# Patient Record
Sex: MALE | Race: WHITE | Employment: UNEMPLOYED | URBAN - METROPOLITAN AREA
[De-identification: names, ages, dates, MRNs, and addresses within clinical notes are randomized per-mention and may not be internally consistent; named-entity substitution may affect disease eponyms.]

---

## 2017-10-22 ENCOUNTER — HOSPITAL ENCOUNTER (EMERGENCY)
Age: 37
Discharge: HOME | End: 2017-10-22
Attending: EMERGENCY MEDICINE
Payer: SELF-PAY

## 2017-10-22 ENCOUNTER — APPOINTMENT (OUTPATIENT)
Dept: GENERAL RADIOLOGY | Age: 37
End: 2017-10-22
Attending: EMERGENCY MEDICINE
Payer: SELF-PAY

## 2017-10-22 VITALS
RESPIRATION RATE: 16 BRPM | TEMPERATURE: 98.5 F | BODY MASS INDEX: 24.14 KG/M2 | DIASTOLIC BLOOD PRESSURE: 82 MMHG | OXYGEN SATURATION: 98 % | WEIGHT: 168.21 LBS | SYSTOLIC BLOOD PRESSURE: 141 MMHG | HEART RATE: 93 BPM

## 2017-10-22 DIAGNOSIS — K08.89 PAIN, DENTAL: ICD-10-CM

## 2017-10-22 DIAGNOSIS — I10 ESSENTIAL HYPERTENSION: ICD-10-CM

## 2017-10-22 DIAGNOSIS — Z76.0 MEDICATION REFILL: Primary | ICD-10-CM

## 2017-10-22 DIAGNOSIS — R07.89 ATYPICAL CHEST PAIN: ICD-10-CM

## 2017-10-22 DIAGNOSIS — R19.7 DIARRHEA, UNSPECIFIED TYPE: ICD-10-CM

## 2017-10-22 LAB
ALBUMIN SERPL-MCNC: 3.6 G/DL (ref 3.5–5)
ALBUMIN/GLOB SERPL: 0.9 {RATIO} (ref 1.1–2.2)
ALP SERPL-CCNC: 90 U/L (ref 45–117)
ALT SERPL-CCNC: 78 U/L (ref 12–78)
ANION GAP SERPL CALC-SCNC: 4 MMOL/L (ref 5–15)
AST SERPL-CCNC: 40 U/L (ref 15–37)
BASOPHILS # BLD: 0 K/UL (ref 0–0.1)
BASOPHILS NFR BLD: 0 % (ref 0–1)
BILIRUB SERPL-MCNC: 0.3 MG/DL (ref 0.2–1)
BUN SERPL-MCNC: 24 MG/DL (ref 6–20)
BUN/CREAT SERPL: 27 (ref 12–20)
CALCIUM SERPL-MCNC: 9 MG/DL (ref 8.5–10.1)
CHLORIDE SERPL-SCNC: 106 MMOL/L (ref 97–108)
CO2 SERPL-SCNC: 28 MMOL/L (ref 21–32)
CREAT SERPL-MCNC: 0.88 MG/DL (ref 0.7–1.3)
EOSINOPHIL # BLD: 0.1 K/UL (ref 0–0.4)
EOSINOPHIL NFR BLD: 1 % (ref 0–7)
ERYTHROCYTE [DISTWIDTH] IN BLOOD BY AUTOMATED COUNT: 15.1 % (ref 11.5–14.5)
GLOBULIN SER CALC-MCNC: 3.9 G/DL (ref 2–4)
GLUCOSE SERPL-MCNC: 101 MG/DL (ref 65–100)
HCT VFR BLD AUTO: 44.5 % (ref 36.6–50.3)
HGB BLD-MCNC: 14.9 G/DL (ref 12.1–17)
LIPASE SERPL-CCNC: 179 U/L (ref 73–393)
LYMPHOCYTES # BLD: 2.1 K/UL (ref 0.8–3.5)
LYMPHOCYTES NFR BLD: 36 % (ref 12–49)
MCH RBC QN AUTO: 28.5 PG (ref 26–34)
MCHC RBC AUTO-ENTMCNC: 33.5 G/DL (ref 30–36.5)
MCV RBC AUTO: 85.2 FL (ref 80–99)
MONOCYTES # BLD: 0.4 K/UL (ref 0–1)
MONOCYTES NFR BLD: 7 % (ref 5–13)
NEUTS SEG # BLD: 3.3 K/UL (ref 1.8–8)
NEUTS SEG NFR BLD: 56 % (ref 32–75)
PLATELET # BLD AUTO: 173 K/UL (ref 150–400)
POTASSIUM SERPL-SCNC: 4.1 MMOL/L (ref 3.5–5.1)
PROT SERPL-MCNC: 7.5 G/DL (ref 6.4–8.2)
RBC # BLD AUTO: 5.22 M/UL (ref 4.1–5.7)
SODIUM SERPL-SCNC: 138 MMOL/L (ref 136–145)
TROPONIN I SERPL-MCNC: <0.04 NG/ML
WBC # BLD AUTO: 6 K/UL (ref 4.1–11.1)

## 2017-10-22 PROCEDURE — 71020 XR CHEST PA LAT: CPT

## 2017-10-22 PROCEDURE — 93005 ELECTROCARDIOGRAM TRACING: CPT

## 2017-10-22 PROCEDURE — 84484 ASSAY OF TROPONIN QUANT: CPT | Performed by: EMERGENCY MEDICINE

## 2017-10-22 PROCEDURE — 99285 EMERGENCY DEPT VISIT HI MDM: CPT

## 2017-10-22 PROCEDURE — 74011250637 HC RX REV CODE- 250/637: Performed by: EMERGENCY MEDICINE

## 2017-10-22 PROCEDURE — 80053 COMPREHEN METABOLIC PANEL: CPT | Performed by: EMERGENCY MEDICINE

## 2017-10-22 PROCEDURE — 36415 COLL VENOUS BLD VENIPUNCTURE: CPT | Performed by: EMERGENCY MEDICINE

## 2017-10-22 PROCEDURE — 83690 ASSAY OF LIPASE: CPT | Performed by: EMERGENCY MEDICINE

## 2017-10-22 PROCEDURE — 85025 COMPLETE CBC W/AUTO DIFF WBC: CPT | Performed by: EMERGENCY MEDICINE

## 2017-10-22 RX ORDER — METOPROLOL TARTRATE 50 MG/1
50 TABLET ORAL 2 TIMES DAILY
Qty: 60 TAB | Refills: 0 | Status: SHIPPED | OUTPATIENT
Start: 2017-10-22 | End: 2017-11-17 | Stop reason: SDUPTHER

## 2017-10-22 RX ORDER — METOPROLOL TARTRATE 50 MG/1
50 TABLET ORAL
Status: COMPLETED | OUTPATIENT
Start: 2017-10-22 | End: 2017-10-22

## 2017-10-22 RX ORDER — LOPERAMIDE HYDROCHLORIDE 2 MG/1
2 CAPSULE ORAL
Qty: 20 CAP | Refills: 0 | Status: SHIPPED | OUTPATIENT
Start: 2017-10-22 | End: 2017-11-17 | Stop reason: SDUPTHER

## 2017-10-22 RX ORDER — ACETAMINOPHEN 325 MG/1
650 TABLET ORAL
Status: COMPLETED | OUTPATIENT
Start: 2017-10-22 | End: 2017-10-22

## 2017-10-22 RX ORDER — LISINOPRIL AND HYDROCHLOROTHIAZIDE 20; 25 MG/1; MG/1
1 TABLET ORAL DAILY
Qty: 30 TAB | Refills: 0 | Status: SHIPPED | OUTPATIENT
Start: 2017-10-22 | End: 2017-11-17 | Stop reason: DRUGHIGH

## 2017-10-22 RX ORDER — PENICILLIN V POTASSIUM 500 MG/1
500 TABLET, FILM COATED ORAL 4 TIMES DAILY
Qty: 28 TAB | Refills: 0 | Status: SHIPPED | OUTPATIENT
Start: 2017-10-22 | End: 2017-10-29

## 2017-10-22 RX ORDER — LISINOPRIL AND HYDROCHLOROTHIAZIDE 20; 25 MG/1; MG/1
1 TABLET ORAL DAILY
COMMUNITY
End: 2017-11-17 | Stop reason: SDUPTHER

## 2017-10-22 RX ORDER — METOPROLOL SUCCINATE 50 MG/1
TABLET, EXTENDED RELEASE ORAL 2 TIMES DAILY
COMMUNITY
End: 2017-11-17 | Stop reason: SDUPTHER

## 2017-10-22 RX ADMIN — METOPROLOL TARTRATE 50 MG: 50 TABLET ORAL at 19:28

## 2017-10-22 RX ADMIN — ACETAMINOPHEN 650 MG: 325 TABLET ORAL at 19:28

## 2017-10-22 RX ADMIN — HYDROCHLOROTHIAZIDE: 25 TABLET ORAL at 19:28

## 2017-10-22 NOTE — ED NOTES
Pt presents to ED for medication refill for metoprolol and Lisinopril-HCTZ because patient is currently at a rehab in [place] and has been unable to go to PCP recently. Pt has left lower dental pain, known bad teeth to left side. Pt reports \"pus\" on left side. Reports taking ibuprofen for pain with some relief. Pt is alert and oriented x 4. Pt denies fever, vomiting. Pt reports nausea. Pt reports diarrhea x last several days x 4 episodes a day and generalized abdominal pain. Pt has been at rehab x 3 months for opiate and cocaine.

## 2017-10-22 NOTE — ED PROVIDER NOTES
[facility].  EMERGENCY DEPARTMENT HISTORY AND PHYSICAL EXAM         Date of Service: 10/22/2017   Patient Name: Dariana Davila   YOB: 1980  Medical Record Number: [id number]    History of Presenting Illness     Chief Complaint   Patient presents with    Medication Refill     Lisinopril 20/25 mg and Metoprolol 50 mg    Dental Pain     thinks he has an infection        History Provided By:  patient and medical records    Additional History:   Dariana Davila is a 44 y.o. male with PMHx significant for opioid abuse, HTN, and hepatitis C, who presents ambulatory to the ED with cc of progressively worsening left lower dental pain that started 3-4 days ago and need for medication refill. He also c/o a generalized headache that started ~ 4 hours ago with associated dizziness. He states that his headache feels like a pressure. He notes that he is prescribed Metoprolol 50 mg BID and Lisinopril-HCTZ 20-25 mg daily. He reports that he ran out of Servio yesterday, and he only took a dose of his Lisinopril this morning. He also c/o constant generalized \"tight\" abdominal pain x 1 week. He states that he has been having ~ 4 episodes of foul smelling diarrhea over the past two days. He notes that he had one episode of blood in his stool yesterday. He also c/o intermittent episodes of central chest pain that radiates to his left arm x 1 month. He reports that his chest pain has worsened over the past 3-4 days. He states that he is currently in rehabilitation for opioid abuse, and he has not had any opioid use in the past 30 days. He denies any recent ABX use, recent sick contacts, or history of cardiac disease. He specifically denies nausea, vomiting, fevers, or other complaints at this time. Social Hx: + Tobacco (0.25 PPD), - EtOH, - Illicit Drugs    There are no other complaints, changes or physical findings at this time.     Primary Care Provider: Rock Roca Octavia Manzo MD     Past History     Past Medical History:   Past Medical History:   Diagnosis Date    Arthritis     Hypertension     Ill-defined condition     Hep C    Other ill-defined conditions(799.89)     hepatitis C, kidney stones    Other ill-defined conditions(799.89)     DDD    Psychiatric disorder     prescription drug abuse        Past Surgical History:   Past Surgical History:   Procedure Laterality Date    COLONOSCOPY N/A 6/29/2016    COLONOSCOPY performed by Tato Alfaro MD at [facility] ENDOSCOPY    HX LITHOTRIPSY      HX UROLOGICAL      lithotripsy        Family History:   Family History   Problem Relation Age of Onset    Heart Disease Mother     Heart Disease Father     Kidney Disease Sister         Social History:   Social History   Substance Use Topics    Smoking status: Current Every Day Smoker     Packs/day: 0.25     Years: 20.00     Types: Cigarettes    Smokeless tobacco: Never Used      Comment: smokes 4 cigars daily    Alcohol use No      Comment: quit approximately 1yr ago        Allergies: Allergies   Allergen Reactions    Ibuprofen Nausea and Vomiting     GI upset; bleeding of the stomach per patient     Nsaids (Non-Steroidal Anti-Inflammatory Drug) Anaphylaxis    Tramadol Seizures    Tylenol [Acetaminophen] Unproven on Challenge     Patient states that he cannot take tylenol because \"its bad for my liver\". Pt reports Hep C and that MD had told him to not take tylenol in the past.         Review of Systems   Review of Systems   Constitutional: Negative for chills, fatigue and fever. HENT: Positive for dental problem. Negative for congestion, rhinorrhea and sore throat. Eyes: Negative for pain, discharge and visual disturbance. Respiratory: Negative for cough, chest tightness, shortness of breath and wheezing. Cardiovascular: Positive for chest pain. Negative for palpitations and leg swelling.    Gastrointestinal: Positive for abdominal pain, blood in stool and diarrhea. Negative for constipation, nausea and vomiting. Genitourinary: Negative for dysuria, frequency and hematuria. Musculoskeletal: Negative for arthralgias, back pain and myalgias. Skin: Negative for rash. Neurological: Positive for dizziness and headaches. Negative for weakness and light-headedness. Psychiatric/Behavioral: Negative. Physical Exam  Physical Exam   Constitutional: He is oriented to person, place, and time. He appears well-developed and well-nourished. No distress. HENT:   Head: Normocephalic and atraumatic. The patient has multiple missing teeth and multiple caries throughout. Tooth #20 is tender to percussion with caries. No intra oral abscess seen. Eyes: EOM are normal. Right eye exhibits no discharge. Left eye exhibits no discharge. No scleral icterus. Neck: Normal range of motion. Neck supple. No tracheal deviation present. Cardiovascular: Normal rate, regular rhythm, normal heart sounds and intact distal pulses. Exam reveals no gallop and no friction rub. No murmur heard. Pulmonary/Chest: Effort normal and breath sounds normal. No respiratory distress. He has no wheezes. He has no rales. Anterior chest wall TTP. Abdominal: Soft. He exhibits no distension. There is no tenderness. Musculoskeletal: Normal range of motion. He exhibits no edema. Lymphadenopathy:     He has no cervical adenopathy. Neurological: He is alert and oriented to person, place, and time. Face is symmetric. Normal speech. Strength is 5/5 to all extremities. (-) Pronator drift. Skin: Skin is warm and dry. No rash noted. Psychiatric: He has a normal mood and affect. Nursing note and vitals reviewed. Medical Decision Making   I am the first provider for this patient. I reviewed the vital signs, available nursing notes, past medical history, past surgical history, family history and social history.      Provider Notes:   Patient initially presented to ED for medication refill and dental pain. Upon ROS, he reports multiple other complaints including headache, abdominal pain, chest pain, diarrhea and dizziness. He appears well on exam, nontoxic, afebrile. Headache is likely primary, do not suspect acute intracranial process. Chest pain has been ongoing x 1 month - low suspicion for ACS. Abdominal exam is benign, overall low suspicion for acute intraabdominal process. Differential includes need for medication refill, dental caries, dental abscess, primary headache, colitis, viral syndrome, dehydration, electrolyte abnormality, pancreatitis, cholecystitis, GIB, atypical chest pain, stable angina, unstable angina, MI, PE, pleurisy, costochondritis, pneumonia, bronchitis, MSK pain. Do not suspect dissection.  - CBC, CMP, lipase, troponin  - CXR  - Blood pressure medications  - Tylenol    ED Course:  6:37 PM   Initial assessment performed. The patients presenting problems have been discussed, and they are in agreement with the care plan formulated and outlined with them. I have encouraged them to ask questions as they arise throughout their visit. Progress Note:  9:43 PM  Labs and imaging unremarkable. Will discharge with PCP follow up. Discussed results, prescriptions and follow up plan with patient. Provided customary return to ED instructions. Patient expressed understanding. Paulina Silver MD      Diagnostic Study Results   Labs -      Recent Results (from the past 12 hour(s))   EKG, 12 LEAD, INITIAL    Collection Time: 10/22/17  7:28 PM   Result Value Ref Range    Ventricular Rate 79 BPM    Atrial Rate 79 BPM    P-R Interval 150 ms    QRS Duration 94 ms    Q-T Interval 360 ms    QTC Calculation (Bezet) 412 ms    Calculated P Axis 49 degrees    Calculated R Axis 55 degrees    Calculated T Axis 31 degrees    Diagnosis       Normal sinus rhythm with sinus arrhythmia  Possible Left atrial enlargement  When compared with ECG of 25-JUN-2016 18:43,  Vent. rate has decreased BY  57 BPM  ST no longer depressed in Inferior leads     CBC WITH AUTOMATED DIFF    Collection Time: 10/22/17  7:37 PM   Result Value Ref Range    WBC 6.0 4.1 - 11.1 K/uL    RBC 5.22 4.10 - 5.70 M/uL    HGB 14.9 12.1 - 17.0 g/dL    HCT 44.5 36.6 - 50.3 %    MCV 85.2 80.0 - 99.0 FL    MCH 28.5 26.0 - 34.0 PG    MCHC 33.5 30.0 - 36.5 g/dL    RDW 15.1 (H) 11.5 - 14.5 %    PLATELET 288 098 - 220 K/uL    NEUTROPHILS 56 32 - 75 %    LYMPHOCYTES 36 12 - 49 %    MONOCYTES 7 5 - 13 %    EOSINOPHILS 1 0 - 7 %    BASOPHILS 0 0 - 1 %    ABS. NEUTROPHILS 3.3 1.8 - 8.0 K/UL    ABS. LYMPHOCYTES 2.1 0.8 - 3.5 K/UL    ABS. MONOCYTES 0.4 0.0 - 1.0 K/UL    ABS. EOSINOPHILS 0.1 0.0 - 0.4 K/UL    ABS. BASOPHILS 0.0 0.0 - 0.1 K/UL   METABOLIC PANEL, COMPREHENSIVE    Collection Time: 10/22/17  7:37 PM   Result Value Ref Range    Sodium 138 136 - 145 mmol/L    Potassium 4.1 3.5 - 5.1 mmol/L    Chloride 106 97 - 108 mmol/L    CO2 28 21 - 32 mmol/L    Anion gap 4 (L) 5 - 15 mmol/L    Glucose 101 (H) 65 - 100 mg/dL    BUN 24 (H) 6 - 20 MG/DL    Creatinine 0.88 0.70 - 1.30 MG/DL    BUN/Creatinine ratio 27 (H) 12 - 20      GFR est AA >60 >60 ml/min/1.73m2    GFR est non-AA >60 >60 ml/min/1.73m2    Calcium 9.0 8.5 - 10.1 MG/DL    Bilirubin, total 0.3 0.2 - 1.0 MG/DL    ALT (SGPT) 78 12 - 78 U/L    AST (SGOT) 40 (H) 15 - 37 U/L    Alk. phosphatase 90 45 - 117 U/L    Protein, total 7.5 6.4 - 8.2 g/dL    Albumin 3.6 3.5 - 5.0 g/dL    Globulin 3.9 2.0 - 4.0 g/dL    A-G Ratio 0.9 (L) 1.1 - 2.2     TROPONIN I    Collection Time: 10/22/17  7:37 PM   Result Value Ref Range    Troponin-I, Qt. <0.04 <0.05 ng/mL   LIPASE    Collection Time: 10/22/17  7:37 PM   Result Value Ref Range    Lipase 179 73 - 393 U/L       Radiologic Studies -  The following have been ordered and reviewed:  CXR Results  (Last 48 hours)               10/22/17 1914  XR CHEST PA LAT Final result    Impression:  IMPRESSION: No acute cardiopulmonary disease. Narrative: Indication:  chest pain intermittent x months        Exam: PA and lateral views of the chest.       Direct comparison is made to prior CXR dated June 2016. Findings: Cardiomediastinal silhouette is within normal limits. Lungs are clear   bilaterally. Pleural spaces are normal. Osseous structures are intact. Vital Signs-Reviewed the patient's vital signs. Patient Vitals for the past 12 hrs:   Temp Pulse Resp BP SpO2   10/22/17 2030 - - - 148/88 99 %   10/22/17 2000 - - - 137/79 99 %   10/22/17 1900 - - - 148/90 99 %   10/22/17 1847 - 93 16 (!) 164/101 100 %   10/22/17 1759 98.5 °F (36.9 °C) 90 16 (!) 164/117 100 %       EKG interpretation: (Preliminary) 19:28  Rhythm: normal sinus rhythm with sinus arrhythmia; and regular . Rate (approx.): 79; Axis: normal; MO interval: normal; QRS interval: normal ; ST/T wave: normal; Other findings: normal.  Written by Carol Maciel ED Scribe, as dictated by Rakel Jaimes MD.    Medications Given in the ED:  Medications   metoprolol tartrate (LOPRESSOR) tablet 50 mg (50 mg Oral Given 10/22/17 1928)   lisinopril/hydroCHLOROthiazide(PRINZIDE/ZESTORETIC) 20/25 mg ( Oral Given 10/22/17 1928)   acetaminophen (TYLENOL) tablet 650 mg (650 mg Oral Given 10/22/17 1928)       Diagnosis:  Clinical Impression:   1. Medication refill    2. Pain, dental    3. Atypical chest pain    4. Diarrhea, unspecified type    5.  Essential hypertension         Plan:  1:   Follow-up Information     Follow up With Details Comments Contact Info    Avi Navarrete MD In 3 days  [place] [address]  [phone]      [facility] EMERGENCY DEPT  As needed, If symptoms worsen [address] [place] [address]  [phone]          2:   Current Discharge Medication List      START taking these medications    Details   !! lisinopril-hydroCHLOROthiazide (PRINZIDE, ZESTORETIC) 20-25 mg per tablet Take 1 Tab by mouth daily.  Qty: 30 Tab, Refills: 0      loperamide (IMODIUM) 2 mg capsule Take 1 Cap by mouth four (4) times daily as needed for Diarrhea. Qty: 20 Cap, Refills: 0      metoprolol tartrate (LOPRESSOR) 50 mg tablet Take 1 Tab by mouth two (2) times a day. Qty: 60 Tab, Refills: 0      penicillin v potassium (VEETID) 500 mg tablet Take 1 Tab by mouth four (4) times daily for 7 days. Qty: 28 Tab, Refills: 0       !! - Potential duplicate medications found. Please discuss with provider. CONTINUE these medications which have NOT CHANGED    Details   !! lisinopril-hydroCHLOROthiazide (PRINZIDE, ZESTORETIC) 20-25 mg per tablet Take 1 Tab by mouth daily. !! - Potential duplicate medications found. Please discuss with provider. Return to ED if worse. Disposition:  Discharge Note:  9:43 PM  The pt is ready for discharge. The pt's signs, symptoms, diagnosis, and discharge instructions have been discussed and pt has conveyed their understanding. The pt is to follow up as recommended or return to ER should their symptoms worsen. Plan has been discussed and pt is in agreement. Attestations: This note is prepared by Rafael Brownlee, acting as a Scribe for Jeaneth Singh MD.    Jeaneth Singh MD: The scribe's documentation has been prepared under my direction and personally reviewed by me in its entirety. I confirm that the notes above accurately reflects all work, treatment, procedures, and medical decision making performed by me. This note will not be viewable in [address]

## 2017-10-22 NOTE — ED NOTES
Bedside and Verbal shift change report given to [address] (oncoming nurse) by Naheed Matthews RN (offgoing nurse). Report included the following information SBAR and ED Summary.

## 2017-10-22 NOTE — ED NOTES
Assumed care of patient from [facility]. Patient resting comfortably in no apparent distress. On monitor x 2. Call bell within reach. Plan of care discussed.

## 2017-10-23 LAB
ATRIAL RATE: 79 BPM
CALCULATED P AXIS, ECG09: 49 DEGREES
CALCULATED R AXIS, ECG10: 55 DEGREES
CALCULATED T AXIS, ECG11: 31 DEGREES
DIAGNOSIS, 93000: NORMAL
P-R INTERVAL, ECG05: 150 MS
Q-T INTERVAL, ECG07: 360 MS
QRS DURATION, ECG06: 94 MS
QTC CALCULATION (BEZET), ECG08: 412 MS
VENTRICULAR RATE, ECG03: 79 BPM

## 2017-10-23 NOTE — DISCHARGE INSTRUCTIONS
High Blood Pressure: Care Instructions  Your Care Instructions  If your blood pressure is usually above 140/90, you have high blood pressure, or hypertension. That means the top number is 140 or higher or the bottom number is 90 or higher, or both. Despite what a lot of people think, high blood pressure usually doesn't cause headaches or make you feel dizzy or lightheaded. It usually has no symptoms. But it does increase your risk for heart attack, stroke, and kidney or eye damage. The higher your blood pressure, the more your risk increases. Your doctor will give you a goal for your blood pressure. Your goal will be based on your health and your age. An example of a goal is to keep your blood pressure below 140/90. Lifestyle changes, such as eating healthy and being active, are always important to help lower blood pressure. You might also take medicine to reach your blood pressure goal.  Follow-up care is a key part of your treatment and safety. Be sure to make and go to all appointments, and call your doctor if you are having problems. It's also a good idea to know your test results and keep a list of the medicines you take. How can you care for yourself at home? Medical treatment  · If you stop taking your medicine, your blood pressure will go back up. You may take one or more types of medicine to lower your blood pressure. Be safe with medicines. Take your medicine exactly as prescribed. Call your doctor if you think you are having a problem with your medicine. · Talk to your doctor before you start taking aspirin every day. Aspirin can help certain people lower their risk of a heart attack or stroke. But taking aspirin isn't right for everyone, because it can cause serious bleeding. · See your doctor regularly. You may need to see the doctor more often at first or until your blood pressure comes down.   · If you are taking blood pressure medicine, talk to your doctor before you take decongestants or anti-inflammatory medicine, such as ibuprofen. Some of these medicines can raise blood pressure. · Learn how to check your blood pressure at home. Lifestyle changes  · Stay at a healthy weight. This is especially important if you put on weight around the waist. Losing even 10 pounds can help you lower your blood pressure. · If your doctor recommends it, get more exercise. Walking is a good choice. Bit by bit, increase the amount you walk every day. Try for at least 30 minutes on most days of the week. You also may want to swim, bike, or do other activities. · Avoid or limit alcohol. Talk to your doctor about whether you can drink any alcohol. · Try to limit how much sodium you eat to less than 2,300 milligrams (mg) a day. Your doctor may ask you to try to eat less than 1,500 mg a day. · Eat plenty of fruits (such as bananas and oranges), vegetables, legumes, whole grains, and low-fat dairy products. · Lower the amount of saturated fat in your diet. Saturated fat is found in animal products such as milk, cheese, and meat. Limiting these foods may help you lose weight and also lower your risk for heart disease. · Do not smoke. Smoking increases your risk for heart attack and stroke. If you need help quitting, talk to your doctor about stop-smoking programs and medicines. These can increase your chances of quitting for good. When should you call for help? Call 911 anytime you think you may need emergency care. This may mean having symptoms that suggest that your blood pressure is causing a serious heart or blood vessel problem. Your blood pressure may be over 180/110. For example, call 911 if:  · You have symptoms of a heart attack. These may include:  ¨ Chest pain or pressure, or a strange feeling in the chest.  ¨ Sweating. ¨ Shortness of breath. ¨ Nausea or vomiting. ¨ Pain, pressure, or a strange feeling in the back, neck, jaw, or upper belly or in one or both shoulders or arms.   ¨ Lightheadedness or sudden weakness. ¨ A fast or irregular heartbeat. · You have symptoms of a stroke. These may include:  ¨ Sudden numbness, tingling, weakness, or loss of movement in your face, arm, or leg, especially on only one side of your body. ¨ Sudden vision changes. ¨ Sudden trouble speaking. ¨ Sudden confusion or trouble understanding simple statements. ¨ Sudden problems with walking or balance. ¨ A sudden, severe headache that is different from past headaches. · You have severe back or belly pain. Do not wait until your blood pressure comes down on its own. Get help right away. Call your doctor now or seek immediate care if:  · Your blood pressure is much higher than normal (such as 180/110 or higher), but you don't have symptoms. · You think high blood pressure is causing symptoms, such as:  ¨ Severe headache. ¨ Blurry vision. Watch closely for changes in your health, and be sure to contact your doctor if:  · Your blood pressure measures 140/90 or higher at least 2 times. That means the top number is 140 or higher or the bottom number is 90 or higher, or both. · You think you may be having side effects from your blood pressure medicine. · Your blood pressure is usually normal, but it goes above normal at least 2 times. Where can you learn more? Go to http://manav-kota.info/. Enter T129 in the search box to learn more about \"High Blood Pressure: Care Instructions. \"  Current as of: August 8, 2016  Content Version: 11.3  © 9441-3204 [employer]. Care instructions adapted under license by [employer] (which disclaims liability or warranty for this information). If you have questions about a medical condition or this instruction, always ask your healthcare professional. [place] [address] any warranty or liability for your use of this information.          Diarrhea: Care Instructions  Your Care Instructions    Diarrhea is loose, watery stools (bowel movements). The exact cause is often hard to find. Sometimes diarrhea is your body's way of getting rid of what caused an upset stomach. Viruses, food poisoning, and many medicines can cause diarrhea. Some people get diarrhea in response to emotional stress, anxiety, or certain foods. Almost everyone has diarrhea now and then. It usually isn't serious, and your stools will return to normal soon. The important thing to do is replace the fluids you have lost, so you can prevent dehydration. The doctor has checked you carefully, but problems can develop later. If you notice any problems or new symptoms, get medical treatment right away. Follow-up care is a key part of your treatment and safety. Be sure to make and go to all appointments, and call your doctor if you are having problems. It's also a good idea to know your test results and keep a list of the medicines you take. How can you care for yourself at home? · Watch for signs of dehydration, which means your body has lost too much water. Dehydration is a serious condition and should be treated right away. Signs of dehydration are:  ¨ Increasing thirst and dry eyes and mouth. ¨ Feeling faint or lightheaded. ¨ Darker urine, and a smaller amount of urine than normal.  · To prevent dehydration, drink plenty of fluids, enough so that your urine is light yellow or clear like water. Choose water and other caffeine-free clear liquids until you feel better. If you have kidney, heart, or liver disease and have to limit fluids, talk with your doctor before you increase the amount of fluids you drink. · Begin eating small amounts of mild foods the next day, if you feel like it. ¨ Try yogurt that has live cultures of Lactobacillus. (Check the label.)  ¨ Avoid spicy foods, fruits, alcohol, and caffeine until 48 hours after all symptoms are gone. ¨ Avoid chewing gum that contains sorbitol.   ¨ Avoid dairy products (except for yogurt with Lactobacillus) while you have diarrhea and for 3 days after symptoms are gone. · The doctor may recommend that you take over-the-counter medicine, such as loperamide (Imodium), if you still have diarrhea after 6 hours. Read and follow all instructions on the label. Do not use this medicine if you have bloody diarrhea, a high fever, or other signs of serious illness. Call your doctor if you think you are having a problem with your medicine. When should you call for help? Call 911 anytime you think you may need emergency care. For example, call if:  · You passed out (lost consciousness). · Your stools are maroon or very bloody. Call your doctor now or seek immediate medical care if:  · You are dizzy or lightheaded, or you feel like you may faint. · Your stools are black and look like tar, or they have streaks of blood. · You have new or worse belly pain. · You have symptoms of dehydration, such as:  ¨ Dry eyes and a dry mouth. ¨ Passing only a little dark urine. ¨ Feeling thirstier than usual.  · You have a new or higher fever. Watch closely for changes in your health, and be sure to contact your doctor if:  · Your diarrhea is getting worse. · You see pus in the diarrhea. · You are not getting better after 2 days (48 hours). Where can you learn more? Go to http://manav-kota.info/. Enter P316 in the search box to learn more about \"Diarrhea: Care Instructions. \"  Current as of: March 20, 2017  Content Version: 11.3  © 1935-5807 [employer]. Care instructions adapted under license by [employer] (which disclaims liability or warranty for this information). If you have questions about a medical condition or this instruction, always ask your healthcare professional. [place] [address] any warranty or liability for your use of this information. Chest Pain: Care Instructions  Your Care Instructions  There are many things that can cause chest pain.  Some are not serious and will get better on their own in a few days. But some kinds of chest pain need more testing and treatment. Your doctor may have recommended a follow-up visit in the next 8 to 12 hours. If you are not getting better, you may need more tests or treatment. Even though your doctor has released you, you still need to watch for any problems. The doctor carefully checked you, but sometimes problems can develop later. If you have new symptoms or if your symptoms do not get better, get medical care right away. If you have worse or different chest pain or pressure that lasts more than 5 minutes or you passed out (lost consciousness), call 911 or seek other emergency help right away. A medical visit is only one step in your treatment. Even if you feel better, you still need to do what your doctor recommends, such as going to all suggested follow-up appointments and taking medicines exactly as directed. This will help you recover and help prevent future problems. How can you care for yourself at home? · Rest until you feel better. · Take your medicine exactly as prescribed. Call your doctor if you think you are having a problem with your medicine. · Do not drive after taking a prescription pain medicine. When should you call for help? Call 911 if:  · You passed out (lost consciousness). · You have severe difficulty breathing. · You have symptoms of a heart attack. These may include:  ¨ Chest pain or pressure, or a strange feeling in your chest.  ¨ Sweating. ¨ Shortness of breath. ¨ Nausea or vomiting. ¨ Pain, pressure, or a strange feeling in your back, neck, jaw, or upper belly or in one or both shoulders or arms. ¨ Lightheadedness or sudden weakness. ¨ A fast or irregular heartbeat. After you call 911, the  may tell you to chew 1 adult-strength or 2 to 4 low-dose aspirin. Wait for an ambulance. Do not try to drive yourself. Call your doctor today if:  · You have any trouble breathing.   · Your chest pain gets worse. · You are dizzy or lightheaded, or you feel like you may faint. · You are not getting better as expected. · You are having new or different chest pain. Where can you learn more? Go to http://manav-kota.info/. Enter A120 in the search box to learn more about \"Chest Pain: Care Instructions. \"  Current as of: March 20, 2017  Content Version: 11.3  © 2542-0296 [employer]. Care instructions adapted under license by [employer] (which disclaims liability or warranty for this information). If you have questions about a medical condition or this instruction, always ask your healthcare professional. [place] [address] any warranty or liability for your use of this information.

## 2017-10-23 NOTE — ED NOTES
Discharge instructions reviewed with pt and copy given along with RX by Dr Hieu Cosby. All questions answered this time. Pt discharged ambulatory home.

## 2017-11-17 ENCOUNTER — OFFICE VISIT (OUTPATIENT)
Dept: FAMILY MEDICINE CLINIC | Age: 37
End: 2017-11-17

## 2017-11-17 VITALS
OXYGEN SATURATION: 99 % | TEMPERATURE: 97.9 F | RESPIRATION RATE: 14 BRPM | HEIGHT: 70 IN | DIASTOLIC BLOOD PRESSURE: 67 MMHG | BODY MASS INDEX: 24.62 KG/M2 | HEART RATE: 59 BPM | WEIGHT: 172 LBS | SYSTOLIC BLOOD PRESSURE: 114 MMHG

## 2017-11-17 DIAGNOSIS — I10 ESSENTIAL HYPERTENSION: Primary | ICD-10-CM

## 2017-11-17 RX ORDER — LISINOPRIL AND HYDROCHLOROTHIAZIDE 10; 12.5 MG/1; MG/1
1 TABLET ORAL DAILY
Qty: 90 TAB | Refills: 3 | Status: SHIPPED | OUTPATIENT
Start: 2017-11-17 | End: 2018-10-10 | Stop reason: ALTCHOICE

## 2017-11-17 RX ORDER — LOPERAMIDE HYDROCHLORIDE 2 MG/1
2 CAPSULE ORAL
Qty: 60 CAP | Refills: 5 | Status: SHIPPED | OUTPATIENT
Start: 2017-11-17 | End: 2018-10-10 | Stop reason: ALTCHOICE

## 2017-11-17 RX ORDER — AZITHROMYCIN 250 MG/1
TABLET, FILM COATED ORAL
Qty: 6 TAB | Refills: 0 | Status: SHIPPED | OUTPATIENT
Start: 2017-11-17 | End: 2018-10-10 | Stop reason: ALTCHOICE

## 2017-11-17 RX ORDER — METOPROLOL TARTRATE 50 MG/1
50 TABLET ORAL 2 TIMES DAILY
Qty: 180 TAB | Refills: 3 | Status: SHIPPED | OUTPATIENT
Start: 2017-11-17 | End: 2018-12-08 | Stop reason: SDUPTHER

## 2017-11-17 NOTE — PROGRESS NOTES
HISTORY OF PRESENT ILLNESS  Tamra Smith III is a 44 y.o. male. HPI In for refill of bp meds. Has been off of drugs, going to a program in [address] [facility]. Lives there. This is a [employer] program. 50 people stay there. Has been there for one month. Has been off of drugs for 4 months. Has gained 34 lbs. Has also had a dental infection that wants checked. ROS    Physical Exam   Constitutional: He appears well-developed and well-nourished. HENT:   Right Ear: External ear normal.   Left Ear: External ear normal.   Mouth/Throat: Oropharynx is clear and moist.   Neck: No thyromegaly present. Cardiovascular: Normal rate, regular rhythm, normal heart sounds and intact distal pulses. Pulmonary/Chest: Effort normal and breath sounds normal. No respiratory distress. He has no wheezes. Abdominal: Soft. Bowel sounds are normal. He exhibits no distension and no mass. There is no tenderness. There is no guarding. Musculoskeletal: Normal range of motion. He exhibits no edema. Lymphadenopathy:     He has no cervical adenopathy. Nursing note and vitals reviewed. ASSESSMENT and PLAN  Orders Placed This Encounter    metoprolol tartrate (LOPRESSOR) 50 mg tablet    azithromycin (ZITHROMAX) 250 mg tablet    lisinopril-hydroCHLOROthiazide (PRINZIDE, ZESTORETIC) 10-12.5 mg per tablet    loperamide (IMODIUM) 2 mg capsule     Diagnoses and all orders for this visit:    1. Essential hypertension    Other orders  -     metoprolol tartrate (LOPRESSOR) 50 mg tablet; Take 1 Tab by mouth two (2) times a day. For blood pressure  -     azithromycin (ZITHROMAX) 250 mg tablet; 2 tabs po- day 1, then 1 tab po- days 2-5.  -     lisinopril-hydroCHLOROthiazide (PRINZIDE, ZESTORETIC) 10-12.5 mg per tablet; Take 1 Tab by mouth daily. For blood pressure  -     loperamide (IMODIUM) 2 mg capsule; Take 1 Cap by mouth four (4) times daily as needed for Diarrhea.       Follow-up Disposition: Not on File

## 2017-11-17 NOTE — PROGRESS NOTES
Chief Complaint   Patient presents with    Diarrhea    Abdominal Pain    Nausea    Hypertension    ED Follow-up     10/22/17   abdominal pain      Stool Color Change     Dark stools    Chest Pain    Sore Throat    Cough     productive cough     yellow phlegm  black dots        1. Have you been to the ER, urgent care clinic since your last visit? Hospitalized since your last visit? No    2. Have you seen or consulted any other health care providers outside of the [address] [place] [address] since your last visit? Include any pap smears or colon screening. No       There are no preventive care reminders to display for this patient.

## 2017-11-17 NOTE — MR AVS SNAPSHOT
Visit Information Date & Time Provider Department Dept. Phone Encounter #  
 11/17/2017 11:00 AM Leann Voss MD [facility] [phone] [id number] Upcoming Health Maintenance Date Due DTaP/Tdap/Td series (2 - Td) 9/14/2025 Allergies as of 11/17/2017  Review Complete On: 11/17/2017 By: Leann Voss MD  
  
 Severity Noted Reaction Type Reactions Ibuprofen Medium 03/21/2011    Nausea and Vomiting GI upset; bleeding of the stomach per patient Nsaids (Non-steroidal Anti-inflammatory Drug)  03/21/2011    Anaphylaxis Tramadol  08/01/2013    Seizures Tylenol [Acetaminophen] Low 03/21/2011    Unproven on Challenge Patient states that he cannot take tylenol because \"its bad for my liver\". Pt reports Hep C and that MD had told him to not take tylenol in the past.   
  
Current Immunizations  Reviewed on 9/14/2015 Name Date Tdap 9/14/2015 Not reviewed this visit You Were Diagnosed With   
  
 Codes Comments Essential hypertension    -  Primary ICD-10-CM: I10 
ICD-9-CM: 401.9 Vitals BP Pulse Temp Resp Height(growth percentile) Weight(growth percentile) 114/67 (!) 59 97.9 °F (36.6 °C) (Oral) 14 5' 10\" (1.778 m) 172 lb (78 kg) SpO2 BMI Smoking Status 99% 24.68 kg/m2 Current Every Day Smoker BMI and BSA Data Body Mass Index Body Surface Area  
 24.68 kg/m 2 1.96 m 2 Preferred Pharmacy Pharmacy Name Phone [id number], [place] - [address] [phone] Your Updated Medication List  
  
   
This list is accurate as of: 11/17/17 12:24 PM.  Always use your most recent med list.  
  
  
  
  
 azithromycin 250 mg tablet Commonly known as:  Carlos Finch 2 tabs po- day 1, then 1 tab po- days 2-5.  
  
 lisinopril-hydroCHLOROthiazide 10-12.5 mg per tablet Commonly known as:  Jorge Parker  
 Take 1 Tab by mouth daily. For blood pressure  
  
 loperamide 2 mg capsule Commonly known as:  IMODIUM Take 1 Cap by mouth four (4) times daily as needed for Diarrhea.  
  
 metoprolol tartrate 50 mg tablet Commonly known as:  LOPRESSOR Take 1 Tab by mouth two (2) times a day. For blood pressure Prescriptions Sent to Pharmacy Refills  
 metoprolol tartrate (LOPRESSOR) 50 mg tablet 3 Sig: Take 1 Tab by mouth two (2) times a day. For blood pressure Class: Normal  
 [facility] [address] [place] [address] [address] Ph #: [phone] Route: Oral  
 azithromycin (ZITHROMAX) 250 mg tablet 0 Si tabs po- day 1, then 1 tab po- days 2-5. Class: Normal  
 [facility] [address] [place] [address] [address] Ph #: [phone]  
 lisinopril-hydroCHLOROthiazide (PRINZIDE, ZESTORETIC) 10-12.5 mg per tablet 3 Sig: Take 1 Tab by mouth daily. For blood pressure Class: Normal  
 Pharmacy: [facility]/pharmacy [address] [place] [address] [address] Ph #: [phone] Route: Oral  
 loperamide (IMODIUM) 2 mg capsule 5 Sig: Take 1 Cap by mouth four (4) times daily as needed for Diarrhea. Class: Normal  
 Pharmacy: [address] Ph #: [phone] Route: Oral  
  
Introducing [facility]! Romayne Duster introduces Zoobe patient portal. Now you can access parts of your medical record, email your doctor's office, and request medication refills online. 1. In your internet browser, go to [email or web address]t 2. Click on the First Time User? Click Here link in the Sign In box. You will see the New Member Sign Up page. 3. Enter your Zoobe Access Code exactly as it appears below.  You will not need to use this code after youve completed the sign-up process. If you do not sign up before the expiration date, you must request a new code. · [employer] Access Code: [email or web address] Expires: 1/20/2018  5:12 PM 
 
4. Enter the last four digits of your Social Security Number (xxxx) and Date of Birth (mm/dd/yyyy) as indicated and click Submit. You will be taken to the next sign-up page. 5. Create a SenseLogix ID. This will be your [employer] login ID and cannot be changed, so think of one that is secure and easy to remember. 6. Create a [employer] password. You can change your password at any time. 7. Enter your Password Reset Question and Answer. This can be used at a later time if you forget your password. 8. Enter your e-mail address. You will receive e-mail notification when new information is available in [address] 9. Click Sign Up. You can now view and download portions of your medical record. 10. Click the Download Summary menu link to download a portable copy of your medical information. If you have questions, please visit the Frequently Asked Questions section of the [employer] website. Remember, [employer] is NOT to be used for urgent needs. For medical emergencies, dial 911. Now available from your iPhone and Android! Please provide this summary of care documentation to your next provider. Your primary care clinician is listed as Vikash Gonzales. If you have any questions after today's visit, please call [phone].

## 2018-01-15 NOTE — TELEPHONE ENCOUNTER
----- Message from Brandy Bro sent at 1/15/2018  4:00 PM EST -----  Regarding: Dr. Joan Mock  Pt has infected gums and cannot have his teeth pulled until he has antibiotics. He has scheduled for 1/23/18 but would like to start the Rx sooner sent to [facility] on [address]. Best contact number, mother Clydia Kanner, [phone].

## 2018-01-16 RX ORDER — AMOXICILLIN 500 MG/1
500 CAPSULE ORAL 3 TIMES DAILY
Qty: 21 CAP | Refills: 0 | Status: SHIPPED | OUTPATIENT
Start: 2018-01-16 | End: 2018-01-23

## 2018-01-16 NOTE — TELEPHONE ENCOUNTER
Called back and spoke with pt's mother. Suggested pt to see a dentist. Mother stated that pt does not have a dentist and where he lives, the [employer] program there is trying to get him into a facility where they will pull his tooth but he needs to get rid of the infection first, they do not supply any rxs for that.  Told mother would let [address] know and send a request . Mother verbalized understanding

## 2018-10-10 ENCOUNTER — OFFICE VISIT (OUTPATIENT)
Dept: FAMILY MEDICINE CLINIC | Age: 38
End: 2018-10-10

## 2018-10-10 VITALS
HEART RATE: 60 BPM | SYSTOLIC BLOOD PRESSURE: 110 MMHG | RESPIRATION RATE: 12 BRPM | DIASTOLIC BLOOD PRESSURE: 60 MMHG | TEMPERATURE: 98.7 F

## 2018-10-10 DIAGNOSIS — J40 BRONCHITIS: Primary | ICD-10-CM

## 2018-10-10 RX ORDER — AZITHROMYCIN 250 MG/1
TABLET, FILM COATED ORAL
Qty: 6 TAB | Refills: 0 | Status: SHIPPED | OUTPATIENT
Start: 2018-10-10 | End: 2018-12-18 | Stop reason: ALTCHOICE

## 2018-10-10 RX ORDER — PROMETHAZINE HYDROCHLORIDE AND DEXTROMETHORPHAN HYDROBROMIDE 6.25; 15 MG/5ML; MG/5ML
5 SYRUP ORAL
Qty: 240 ML | Refills: 2 | Status: SHIPPED | OUTPATIENT
Start: 2018-10-10 | End: 2019-05-30 | Stop reason: ALTCHOICE

## 2018-10-10 NOTE — PROGRESS NOTES
HISTORY OF PRESENT ILLNESS  Elvis Gonzalez III is a 43 y.o. male. HPI Has been coughing for one week. Cough is productive of yellow sputum. Only smokes a few cigarettes a day. Not taking any meds for this. Some sweats. Some wheezing at times. ROS    Physical Exam   Constitutional: He appears well-developed and well-nourished. HENT:   Right Ear: External ear normal.   Left Ear: External ear normal.   Mouth/Throat: Oropharynx is clear and moist.   Neck: No thyromegaly present. Cardiovascular: Normal rate, regular rhythm, normal heart sounds and intact distal pulses. Pulmonary/Chest: Effort normal and breath sounds normal. No respiratory distress. He has no wheezes. Abdominal: Soft. Bowel sounds are normal. He exhibits no distension and no mass. There is no tenderness. There is no guarding. Musculoskeletal: Normal range of motion. He exhibits no edema. Lymphadenopathy:     He has no cervical adenopathy. Nursing note and vitals reviewed. ASSESSMENT and PLAN  Orders Placed This Encounter    promethazine-dextromethorphan (PROMETHAZINE-DM) 6.25-15 mg/5 mL syrup     Sig: Take 5 mL by mouth four (4) times daily as needed for Cough. Dispense:  240 mL     Refill:  2    azithromycin (ZITHROMAX) 250 mg tablet     Si tabs day 1 , then 1 tab daily next 4 days     Dispense:  6 Tab     Refill:  0     Orders Placed This Encounter    promethazine-dextromethorphan (PROMETHAZINE-DM) 6.25-15 mg/5 mL syrup    azithromycin (ZITHROMAX) 250 mg tablet     Diagnoses and all orders for this visit:    1. Bronchitis    Other orders  -     promethazine-dextromethorphan (PROMETHAZINE-DM) 6.25-15 mg/5 mL syrup; Take 5 mL by mouth four (4) times daily as needed for Cough.   -     azithromycin (ZITHROMAX) 250 mg tablet; 2 tabs day 1 , then 1 tab daily next 4 days

## 2018-10-10 NOTE — MR AVS SNAPSHOT
[address] [facility] [address] 
[phone] Patient: Mariel Ledesma 
MRN: [id number] ECC:5/[id number] Visit Information Date & Time Provider Department Dept. Phone Encounter #  
 10/10/2018  3:45 PM Pily Aragon MD [facility] [phone] [id number] Your Appointments 10/10/2018  3:45 PM  
SAME DAY with Pily Aragon MD  
[facility] [address]) Appt Note: congestion bp pill not working lrl 10/10/18  
 [address] [place], [place] [address] [id number] [phone] [address] [facility] [address] Upcoming Health Maintenance Date Due Influenza Age 5 to Adult 8/1/2018 DTaP/Tdap/Td series (2 - Td) 9/14/2025 Allergies as of 10/10/2018  Review Complete On: 10/10/2018 By: Pily Aragon MD  
  
 Severity Noted Reaction Type Reactions Ibuprofen Medium 03/21/2011    Nausea and Vomiting GI upset; bleeding of the stomach per patient Nsaids (Non-steroidal Anti-inflammatory Drug)  03/21/2011    Anaphylaxis Tramadol  08/01/2013    Seizures Tylenol [Acetaminophen] Low 03/21/2011    Unproven on Challenge Patient states that he cannot take tylenol because \"its bad for my liver\". Pt reports Hep C and that MD had told him to not take tylenol in the past.   
  
Current Immunizations  Reviewed on 9/14/2015 Name Date Tdap 9/14/2015 Not reviewed this visit You Were Diagnosed With   
  
 Codes Comments Bronchitis    -  Primary ICD-10-CM: D44 ICD-9-CM: 152 Vitals BP Pulse Temp Resp Smoking Status 110/60 60 98.7 °F (37.1 °C) 12 Current Every Day Smoker Preferred Pharmacy Pharmacy Name [id number]- [place], [place] - [address] [phone] Your Updated Medication List  
  
   
 This list is accurate as of 10/10/18 12:57 PM.  Always use your most recent med list.  
  
  
  
  
 azithromycin 250 mg tablet Commonly known as:  Elridge Terrance 2 tabs day 1 , then 1 tab daily next 4 days  
  
 metoprolol tartrate 50 mg tablet Commonly known as:  LOPRESSOR Take 1 Tab by mouth two (2) times a day. For blood pressure  
  
 promethazine-dextromethorphan 6.25-15 mg/5 mL syrup Commonly known as:  PROMETHAZINE-DM Take 5 mL by mouth four (4) times daily as needed for Cough. Prescriptions Sent to Pharmacy Refills  
 promethazine-dextromethorphan (PROMETHAZINE-DM) 6.25-15 mg/5 mL syrup 2 Sig: Take 5 mL by mouth four (4) times daily as needed for Cough. Class: Normal  
 Pharmacy: [facility]acy [address] [place] [address] [address] Ph #: [phone] Route: Oral  
 azithromycin (ZITHROMAX) 250 mg tablet 0 Si tabs day 1 , then 1 tab daily next 4 days Class: Normal  
 Pharmacy: [facility] [address] [place] [address] [address] Ph #: [phone] Introducing [facility]! [facility] introduces "[employer] patient portal. Now you can access parts of your medical record, email your doctor's office, and request medication refills online. 1. In your internet browser, go to [email or web address] 2. Click on the First Time User? Click Here link in the Sign In box. You will see the New Member Sign Up page. 3. Enter your "[employer] Access Code exactly as it appears below. You will not need to use this code after youve completed the sign-up process. If you do not sign up before the expiration date, you must request a new code. · "[employer] Access Code: [email or web address] Expires: 2019 12:57 PM 
 
4. Enter the last four digits of your Social Security Number (xxxx) and Date of Birth (mm/dd/yyyy) as indicated and click Submit. You will be taken to the next sign-up page. 5. Create a [facility] ID. This will be your [facility] login ID and cannot be changed, so think of one that is secure and easy to remember. 6. Create a [facility] password. You can change your password at any time. 7. Enter your Password Reset Question and Answer. This can be used at a later time if you forget your password. 8. Enter your e-mail address. You will receive e-mail notification when new information is available in [address] 9. Click Sign Up. You can now view and download portions of your medical record. 10. Click the Download Summary menu link to download a portable copy of your medical information. If you have questions, please visit the Frequently Asked Questions section of the [facility] website. Remember, [facility] is NOT to be used for urgent needs. For medical emergencies, dial 911. Now available from your iPhone and Android! Please provide this summary of care documentation to your next provider. Your primary care clinician is listed as Jack Barrientos. If you have any questions after today's visit, please call [phone].

## 2018-12-09 RX ORDER — METOPROLOL TARTRATE 50 MG/1
50 TABLET ORAL 2 TIMES DAILY
Qty: 180 TAB | Refills: 3 | Status: SHIPPED | OUTPATIENT
Start: 2018-12-09 | End: 2019-05-30 | Stop reason: ALTCHOICE

## 2018-12-18 ENCOUNTER — OFFICE VISIT (OUTPATIENT)
Dept: FAMILY MEDICINE CLINIC | Age: 38
End: 2018-12-18

## 2018-12-18 VITALS
RESPIRATION RATE: 14 BRPM | BODY MASS INDEX: 26.77 KG/M2 | TEMPERATURE: 98.7 F | HEIGHT: 70 IN | SYSTOLIC BLOOD PRESSURE: 137 MMHG | OXYGEN SATURATION: 99 % | DIASTOLIC BLOOD PRESSURE: 89 MMHG | HEART RATE: 55 BPM | WEIGHT: 187 LBS

## 2018-12-18 DIAGNOSIS — R74.8 LIVER ENZYME ELEVATION: Primary | ICD-10-CM

## 2018-12-18 RX ORDER — METHYLPREDNISOLONE 4 MG/1
TABLET ORAL
Qty: 1 DOSE PACK | Refills: 0 | Status: SHIPPED | OUTPATIENT
Start: 2018-12-18 | End: 2019-05-30 | Stop reason: ALTCHOICE

## 2018-12-18 NOTE — PROGRESS NOTES
Chief Complaint   Patient presents with    Shoulder Problem     left shoulder/  pectoral mucle     Leg Pain      right thigh     Labs     pt concerned about liver  hep c      1. Have you been to the ER, urgent care clinic since your last visit? Hospitalized since your last visit? No    2. Have you seen or consulted any other health care providers outside of the [address] [place] [address] since your last visit? Include any pap smears or colon screening. No       There are no preventive care reminders to display for this patient.

## 2018-12-18 NOTE — PROGRESS NOTES
HISTORY OF PRESENT ILLNESS  Inessa Bay III is a 43 y.o. male. HPI In for recheck of liver enzymes. Also slipped a few days ago, injuring neck and L shoulder. ROS    Physical Exam   Constitutional: He appears well-developed and well-nourished. HENT:   Right Ear: External ear normal.   Left Ear: External ear normal.   Mouth/Throat: Oropharynx is clear and moist.   Neck: No thyromegaly present. Cardiovascular: Normal rate, regular rhythm, normal heart sounds and intact distal pulses. Pulmonary/Chest: Effort normal and breath sounds normal. No respiratory distress. He has no wheezes. Abdominal: Soft. Bowel sounds are normal. He exhibits no distension and no mass. There is no tenderness. There is no guarding. Musculoskeletal: Normal range of motion. He exhibits no edema. Back- positive impingement sign   Lymphadenopathy:     He has no cervical adenopathy. Nursing note and vitals reviewed. ASSESSMENT and PLAN  Orders Placed This Encounter    METABOLIC PANEL, COMPREHENSIVE    methylPREDNISolone (MEDROL DOSEPACK) 4 mg tablet     Diagnoses and all orders for this visit:    1. Liver enzyme elevation  -     METABOLIC PANEL, COMPREHENSIVE    Other orders  -     methylPREDNISolone (MEDROL DOSEPACK) 4 mg tablet;  As directed      Follow-up Disposition: Not on File  Would like to avoid alprazolam, will try buspar if continues with anxiety

## 2018-12-19 ENCOUNTER — TELEPHONE (OUTPATIENT)
Dept: FAMILY MEDICINE CLINIC | Age: 38
End: 2018-12-19

## 2018-12-19 LAB
ALBUMIN SERPL-MCNC: 4.1 G/DL (ref 3.5–5.5)
ALBUMIN/GLOB SERPL: 1.9 {RATIO} (ref 1.2–2.2)
ALP SERPL-CCNC: 76 IU/L (ref 39–117)
ALT SERPL-CCNC: 52 IU/L (ref 0–44)
AST SERPL-CCNC: 45 IU/L (ref 0–40)
BILIRUB SERPL-MCNC: 0.4 MG/DL (ref 0–1.2)
BUN SERPL-MCNC: 15 MG/DL (ref 6–20)
BUN/CREAT SERPL: 17 (ref 9–20)
CALCIUM SERPL-MCNC: 9.4 MG/DL (ref 8.7–10.2)
CHLORIDE SERPL-SCNC: 105 MMOL/L (ref 96–106)
CO2 SERPL-SCNC: 26 MMOL/L (ref 20–29)
CREAT SERPL-MCNC: 0.87 MG/DL (ref 0.76–1.27)
GLOBULIN SER CALC-MCNC: 2.2 G/DL (ref 1.5–4.5)
GLUCOSE SERPL-MCNC: 85 MG/DL (ref 65–99)
POTASSIUM SERPL-SCNC: 4.9 MMOL/L (ref 3.5–5.2)
PROT SERPL-MCNC: 6.3 G/DL (ref 6–8.5)
SODIUM SERPL-SCNC: 143 MMOL/L (ref 134–144)

## 2019-05-23 RX ORDER — PROMETHAZINE HYDROCHLORIDE 25 MG/1
25 TABLET ORAL
Qty: 30 TAB | Refills: 0 | Status: SHIPPED | OUTPATIENT
Start: 2019-05-23 | End: 2020-09-24 | Stop reason: SDUPTHER

## 2019-05-23 NOTE — TELEPHONE ENCOUNTER
Patient mother called stating that she would like something called into the pharmacy for the patient. Patient mother stated that he has been vomiting and can not keep anything down. Patient mother would like it sent to the [facility] pharmacy on file. Patient mother can be reached at [phone].

## 2019-05-30 ENCOUNTER — OFFICE VISIT (OUTPATIENT)
Dept: FAMILY MEDICINE CLINIC | Age: 39
End: 2019-05-30

## 2019-05-30 VITALS
SYSTOLIC BLOOD PRESSURE: 150 MMHG | BODY MASS INDEX: 22.9 KG/M2 | TEMPERATURE: 97.6 F | DIASTOLIC BLOOD PRESSURE: 106 MMHG | RESPIRATION RATE: 14 BRPM | HEART RATE: 84 BPM | HEIGHT: 70 IN | OXYGEN SATURATION: 99 % | WEIGHT: 160 LBS

## 2019-05-30 DIAGNOSIS — F31.75 BIPOLAR DISORDER, IN PARTIAL REMISSION, MOST RECENT EPISODE DEPRESSED (HCC): Primary | ICD-10-CM

## 2019-05-30 RX ORDER — CLINDAMYCIN HYDROCHLORIDE 300 MG/1
300 CAPSULE ORAL 3 TIMES DAILY
Qty: 21 CAP | Refills: 1 | Status: SHIPPED | OUTPATIENT
Start: 2019-05-30 | End: 2019-06-09

## 2019-05-30 RX ORDER — DULOXETIN HYDROCHLORIDE 30 MG/1
30 CAPSULE, DELAYED RELEASE ORAL DAILY
Qty: 30 CAP | Refills: 12 | Status: SHIPPED | OUTPATIENT
Start: 2019-05-30 | End: 2020-06-15

## 2019-05-30 RX ORDER — DIVALPROEX SODIUM 500 MG/1
500 TABLET, DELAYED RELEASE ORAL 3 TIMES DAILY
Qty: 90 TAB | Refills: 3 | Status: SHIPPED | OUTPATIENT
Start: 2019-05-30 | End: 2019-07-01 | Stop reason: SDUPTHER

## 2019-05-30 NOTE — PROGRESS NOTES
HISTORY OF PRESENT ILLNESS  Sage Baca III is a 43 y.o. male. HPI has been having abdominal pain . Also feels like if misses a dose of metoprolol, goes into a panic attack. Has been on this for over a year. Currently lives with mother, girlfriend, and his oldest sister. Has been very irritable, gets irritable and goes off on everyone. Not able to focus on things. Not working at present, has no motivation to do anything. Not sleeping well at night. Says that mind races at night, cant slow it down to go to sleep. Doesn't have energy to do anything. at one time was on lithium,, couldn't function on this medicine. Pt is thinking about going to the methadone clinic. ROS    Physical Exam   Constitutional: He appears well-developed and well-nourished. HENT:   Right Ear: External ear normal.   Left Ear: External ear normal.   Mouth/Throat: Oropharynx is clear and moist.   Neck: No thyromegaly present. Cardiovascular: Normal rate, regular rhythm, normal heart sounds and intact distal pulses. Pulmonary/Chest: Effort normal and breath sounds normal. No respiratory distress. He has no wheezes. Abdominal: Soft. Bowel sounds are normal. He exhibits no distension and no mass. There is no tenderness. There is no guarding. Musculoskeletal: Normal range of motion. He exhibits no edema. Lymphadenopathy:     He has no cervical adenopathy. Nursing note and vitals reviewed. ASSESSMENT and PLAN  Orders Placed This Encounter    divalproex DR (DEPAKOTE) 500 mg tablet     Sig: Take 1 Tab by mouth three (3) times daily. Dispense:  90 Tab     Refill:  3    DULoxetine (CYMBALTA) 30 mg capsule     Sig: Take 1 Cap by mouth daily. Dispense:  30 Cap     Refill:  12    clindamycin (CLEOCIN) 300 mg capsule     Sig: Take 1 Cap by mouth three (3) times daily for 10 days.  For dental infection     Dispense:  21 Cap     Refill:  1     Orders Placed This Encounter    divalproex DR (DEPAKOTE) 500 mg tablet    DULoxetine (CYMBALTA) 30 mg capsule    clindamycin (CLEOCIN) 300 mg capsule       Follow-up and Dispositions    · Return in about 2 months (around 7/30/2019).

## 2019-05-30 NOTE — PROGRESS NOTES
Chief Complaint   Patient presents with    Dental Pain    Anxiety    Neck Pain    Shoulder Pain     left shoulder    Medication Evaluation     1. Have you been to the ER, urgent care clinic since your last visit? Hospitalized since your last visit? No    2. Have you seen or consulted any other health care providers outside of the [facility] since your last visit? Include any pap smears or colon screening.  No     Health Maintenance Due   Topic Date Due    Pneumococcal 0-64 years (1 of 1 - PPSV23) 06/04/1986

## 2019-06-17 ENCOUNTER — OFFICE VISIT (OUTPATIENT)
Dept: FAMILY MEDICINE CLINIC | Age: 39
End: 2019-06-17

## 2019-06-17 VITALS
OXYGEN SATURATION: 100 % | BODY MASS INDEX: 22.96 KG/M2 | HEART RATE: 98 BPM | SYSTOLIC BLOOD PRESSURE: 151 MMHG | RESPIRATION RATE: 14 BRPM | HEIGHT: 70 IN | WEIGHT: 160.4 LBS | TEMPERATURE: 98.4 F | DIASTOLIC BLOOD PRESSURE: 122 MMHG

## 2019-06-17 DIAGNOSIS — F31.77 BIPOLAR DISORDER, IN PARTIAL REMISSION, MOST RECENT EPISODE MIXED (HCC): Primary | ICD-10-CM

## 2019-06-17 RX ORDER — AMOXICILLIN 500 MG/1
CAPSULE ORAL
Refills: 0 | COMMUNITY
Start: 2019-06-15 | End: 2019-12-19 | Stop reason: ALTCHOICE

## 2019-06-17 RX ORDER — SUMATRIPTAN 50 MG/1
TABLET, FILM COATED ORAL
Qty: 15 TAB | Refills: 2 | Status: SHIPPED | OUTPATIENT
Start: 2019-06-17 | End: 2019-12-19 | Stop reason: ALTCHOICE

## 2019-06-17 RX ORDER — QUETIAPINE FUMARATE 50 MG/1
50 TABLET, FILM COATED ORAL
Qty: 30 TAB | Refills: 5 | Status: SHIPPED | OUTPATIENT
Start: 2019-06-17 | End: 2019-12-19 | Stop reason: ALTCHOICE

## 2019-06-17 NOTE — PROGRESS NOTES
HISTORY OF PRESENT ILLNESS  Areli Ochoa III is a 35 y.o. male. HPI has been having problems with irritability, severe migraine headaches, vomiting. Has been having the headaches for one week. Cant take ibuprofen. Has been on duloxetine 30 mg daily and depakote 500 mg tid. Has been having sever headaches lasting for 4 hours the last week. No prior hx headaches. No photophobia, phonophobia. Some dizziness when stands up. NO focal neuro symptoms. ROS    Physical Exam   Constitutional: He appears well-developed and well-nourished. HENT:   Right Ear: External ear normal.   Left Ear: External ear normal.   Mouth/Throat: Oropharynx is clear and moist.   Neck: No thyromegaly present. Cardiovascular: Normal rate, regular rhythm, normal heart sounds and intact distal pulses. Pulmonary/Chest: Effort normal and breath sounds normal. No respiratory distress. He has no wheezes. Abdominal: Soft. Bowel sounds are normal. He exhibits no distension and no mass. There is no tenderness. There is no guarding. Musculoskeletal: Normal range of motion. He exhibits no edema. Lymphadenopathy:     He has no cervical adenopathy. Neurological: No cranial nerve deficit. Cer- fnf intact  Gross motor intact  Gait- normal  Heel to toe - normal     Nursing note and vitals reviewed. ASSESSMENT and PLAN  Orders Placed This Encounter    VALPROIC ACID    QUEtiapine (SEROQUEL) 50 mg tablet    SUMAtriptan (IMITREX) 50 mg tablet     Diagnoses and all orders for this visit:    1. Bipolar disorder, in partial remission, most recent episode mixed (HCC)  -     VALPROIC ACID    Other orders  -     QUEtiapine (SEROQUEL) 50 mg tablet; Take 1 Tab by mouth nightly. -     SUMAtriptan (IMITREX) 50 mg tablet; One tab po at onset of headache.  May repeat dose in 2 hours, but no more than 2 tabs in 24 hours

## 2019-06-17 NOTE — PROGRESS NOTES
Chief Complaint   Patient presents with    Dental Pain    Migraine     x 1 week    Nausea    Vomiting     1. Have you been to the ER, urgent care clinic since your last visit? Hospitalized since your last visit? No    2. Have you seen or consulted any other health care providers outside of the [address] [place] [address] since your last visit? Include any pap smears or colon screening.  No     Health Maintenance Due   Topic Date Due    Pneumococcal 0-64 years (1 of 1 - PPSV23) [birth year]1985

## 2019-06-18 LAB — VALPROATE SERPL-MCNC: 122 UG/ML (ref 50–100)

## 2019-07-01 ENCOUNTER — TELEPHONE (OUTPATIENT)
Dept: FAMILY MEDICINE CLINIC | Age: 39
End: 2019-07-01

## 2019-07-01 DIAGNOSIS — F90.2 ATTENTION DEFICIT HYPERACTIVITY DISORDER (ADHD), COMBINED TYPE: Primary | ICD-10-CM

## 2019-07-01 RX ORDER — DIVALPROEX SODIUM 500 MG/1
500 TABLET, DELAYED RELEASE ORAL 2 TIMES DAILY
Qty: 90 TAB | Refills: 3
Start: 2019-07-01 | End: 2020-10-21

## 2019-07-01 RX ORDER — DEXTROAMPHETAMINE SACCHARATE, AMPHETAMINE ASPARTATE MONOHYDRATE, DEXTROAMPHETAMINE SULFATE AND AMPHETAMINE SULFATE 3.75; 3.75; 3.75; 3.75 MG/1; MG/1; MG/1; MG/1
15 CAPSULE, EXTENDED RELEASE ORAL
Qty: 30 CAP | Refills: 0 | Status: SHIPPED | OUTPATIENT
Start: 2019-07-01 | End: 2019-07-30 | Stop reason: DRUGHIGH

## 2019-07-01 NOTE — TELEPHONE ENCOUNTER
pc with pt. Had stopped depakote during cruise due to headaches. Will decrease dose to bid. (level was 122). Will also start adderall for adhd.

## 2019-07-30 RX ORDER — DEXTROAMPHETAMINE SACCHARATE, AMPHETAMINE ASPARTATE, DEXTROAMPHETAMINE SULFATE AND AMPHETAMINE SULFATE 3.75; 3.75; 3.75; 3.75 MG/1; MG/1; MG/1; MG/1
7.5 TABLET ORAL 2 TIMES DAILY
Qty: 30 TAB | Refills: 0 | Status: SHIPPED | OUTPATIENT
Start: 2019-07-30 | End: 2019-08-26 | Stop reason: SDUPTHER

## 2019-09-04 ENCOUNTER — APPOINTMENT (OUTPATIENT)
Dept: CT IMAGING | Age: 39
End: 2019-09-04
Attending: EMERGENCY MEDICINE
Payer: MEDICAID

## 2019-09-04 ENCOUNTER — HOSPITAL ENCOUNTER (EMERGENCY)
Age: 39
Discharge: HOME | End: 2019-09-04
Attending: EMERGENCY MEDICINE
Payer: MEDICAID

## 2019-09-04 VITALS
TEMPERATURE: 97.8 F | HEART RATE: 74 BPM | SYSTOLIC BLOOD PRESSURE: 111 MMHG | WEIGHT: 164.68 LBS | OXYGEN SATURATION: 96 % | RESPIRATION RATE: 18 BRPM | DIASTOLIC BLOOD PRESSURE: 73 MMHG | BODY MASS INDEX: 23.63 KG/M2

## 2019-09-04 DIAGNOSIS — N20.0 KIDNEY STONE ON RIGHT SIDE: Primary | ICD-10-CM

## 2019-09-04 LAB
ALBUMIN SERPL-MCNC: 3.8 G/DL (ref 3.5–5)
ALBUMIN/GLOB SERPL: 1 {RATIO} (ref 1.1–2.2)
ALP SERPL-CCNC: 72 U/L (ref 45–117)
ALT SERPL-CCNC: 74 U/L (ref 12–78)
AMORPH CRY URNS QL MICRO: ABNORMAL
ANION GAP SERPL CALC-SCNC: 6 MMOL/L (ref 5–15)
APPEARANCE UR: ABNORMAL
AST SERPL-CCNC: 47 U/L (ref 15–37)
BACTERIA URNS QL MICRO: ABNORMAL /HPF
BASOPHILS # BLD: 0 K/UL (ref 0–0.1)
BASOPHILS NFR BLD: 0 % (ref 0–1)
BILIRUB SERPL-MCNC: 0.5 MG/DL (ref 0.2–1)
BILIRUB UR QL: NEGATIVE
BUN SERPL-MCNC: 18 MG/DL (ref 6–20)
BUN/CREAT SERPL: 18 (ref 12–20)
CALCIUM SERPL-MCNC: 8.9 MG/DL (ref 8.5–10.1)
CHLORIDE SERPL-SCNC: 107 MMOL/L (ref 97–108)
CO2 SERPL-SCNC: 28 MMOL/L (ref 21–32)
COLOR UR: ABNORMAL
CREAT SERPL-MCNC: 0.98 MG/DL (ref 0.7–1.3)
DIFFERENTIAL METHOD BLD: ABNORMAL
EOSINOPHIL # BLD: 0 K/UL (ref 0–0.4)
EOSINOPHIL NFR BLD: 0 % (ref 0–7)
EPITH CASTS URNS QL MICRO: ABNORMAL /LPF
ERYTHROCYTE [DISTWIDTH] IN BLOOD BY AUTOMATED COUNT: 13.8 % (ref 11.5–14.5)
GLOBULIN SER CALC-MCNC: 3.7 G/DL (ref 2–4)
GLUCOSE SERPL-MCNC: 110 MG/DL (ref 65–100)
GLUCOSE UR STRIP.AUTO-MCNC: 100 MG/DL
HCT VFR BLD AUTO: 47.7 % (ref 36.6–50.3)
HGB BLD-MCNC: 16 G/DL (ref 12.1–17)
HGB UR QL STRIP: ABNORMAL
IMM GRANULOCYTES # BLD AUTO: 0.1 K/UL (ref 0–0.04)
IMM GRANULOCYTES NFR BLD AUTO: 0 % (ref 0–0.5)
KETONES UR QL STRIP.AUTO: 40 MG/DL
LEUKOCYTE ESTERASE UR QL STRIP.AUTO: NEGATIVE
LIPASE SERPL-CCNC: 109 U/L (ref 73–393)
LYMPHOCYTES # BLD: 1.7 K/UL (ref 0.8–3.5)
LYMPHOCYTES NFR BLD: 13 % (ref 12–49)
MCH RBC QN AUTO: 29.7 PG (ref 26–34)
MCHC RBC AUTO-ENTMCNC: 33.5 G/DL (ref 30–36.5)
MCV RBC AUTO: 88.5 FL (ref 80–99)
MONOCYTES # BLD: 0.7 K/UL (ref 0–1)
MONOCYTES NFR BLD: 5 % (ref 5–13)
NEUTS SEG # BLD: 11 K/UL (ref 1.8–8)
NEUTS SEG NFR BLD: 82 % (ref 32–75)
NITRITE UR QL STRIP.AUTO: NEGATIVE
NRBC # BLD: 0 K/UL (ref 0–0.01)
NRBC BLD-RTO: 0 PER 100 WBC
PH UR STRIP: 8.5 [PH] (ref 5–8)
PLATELET # BLD AUTO: 205 K/UL (ref 150–400)
PMV BLD AUTO: 10.2 FL (ref 8.9–12.9)
POTASSIUM SERPL-SCNC: 3.7 MMOL/L (ref 3.5–5.1)
PROT SERPL-MCNC: 7.5 G/DL (ref 6.4–8.2)
PROT UR STRIP-MCNC: 100 MG/DL
RBC # BLD AUTO: 5.39 M/UL (ref 4.1–5.7)
RBC #/AREA URNS HPF: ABNORMAL /HPF (ref 0–5)
SODIUM SERPL-SCNC: 141 MMOL/L (ref 136–145)
SP GR UR REFRACTOMETRY: 1.01 (ref 1–1.03)
UA: UC IF INDICATED,UAUC: ABNORMAL
UROBILINOGEN UR QL STRIP.AUTO: 1 EU/DL (ref 0.2–1)
WBC # BLD AUTO: 13.5 K/UL (ref 4.1–11.1)
WBC URNS QL MICRO: ABNORMAL /HPF (ref 0–4)

## 2019-09-04 PROCEDURE — 74176 CT ABD & PELVIS W/O CONTRAST: CPT

## 2019-09-04 PROCEDURE — 80053 COMPREHEN METABOLIC PANEL: CPT

## 2019-09-04 PROCEDURE — 36415 COLL VENOUS BLD VENIPUNCTURE: CPT

## 2019-09-04 PROCEDURE — 96374 THER/PROPH/DIAG INJ IV PUSH: CPT

## 2019-09-04 PROCEDURE — 83690 ASSAY OF LIPASE: CPT

## 2019-09-04 PROCEDURE — 96375 TX/PRO/DX INJ NEW DRUG ADDON: CPT

## 2019-09-04 PROCEDURE — 74011250636 HC RX REV CODE- 250/636: Performed by: EMERGENCY MEDICINE

## 2019-09-04 PROCEDURE — 96361 HYDRATE IV INFUSION ADD-ON: CPT

## 2019-09-04 PROCEDURE — 85025 COMPLETE CBC W/AUTO DIFF WBC: CPT

## 2019-09-04 PROCEDURE — 87086 URINE CULTURE/COLONY COUNT: CPT

## 2019-09-04 PROCEDURE — 74011250637 HC RX REV CODE- 250/637: Performed by: EMERGENCY MEDICINE

## 2019-09-04 PROCEDURE — 99284 EMERGENCY DEPT VISIT MOD MDM: CPT

## 2019-09-04 PROCEDURE — 81001 URINALYSIS AUTO W/SCOPE: CPT

## 2019-09-04 RX ORDER — MORPHINE SULFATE 2 MG/ML
4 INJECTION, SOLUTION INTRAMUSCULAR; INTRAVENOUS ONCE
Status: COMPLETED | OUTPATIENT
Start: 2019-09-04 | End: 2019-09-04

## 2019-09-04 RX ORDER — MORPHINE SULFATE 4 MG/ML
4 INJECTION INTRAVENOUS ONCE
Status: COMPLETED | OUTPATIENT
Start: 2019-09-04 | End: 2019-09-04

## 2019-09-04 RX ORDER — TAMSULOSIN HYDROCHLORIDE 0.4 MG/1
0.4 CAPSULE ORAL ONCE
Status: COMPLETED | OUTPATIENT
Start: 2019-09-04 | End: 2019-09-04

## 2019-09-04 RX ORDER — OXYCODONE HYDROCHLORIDE 5 MG/1
5 TABLET ORAL
Qty: 5 TAB | Refills: 0 | Status: SHIPPED | OUTPATIENT
Start: 2019-09-04 | End: 2019-09-07

## 2019-09-04 RX ADMIN — MORPHINE SULFATE 4 MG: 4 INJECTION INTRAVENOUS at 10:57

## 2019-09-04 RX ADMIN — TAMSULOSIN HYDROCHLORIDE 0.4 MG: 0.4 CAPSULE ORAL at 08:17

## 2019-09-04 RX ADMIN — SODIUM CHLORIDE 1000 ML: 900 INJECTION, SOLUTION INTRAVENOUS at 08:17

## 2019-09-04 RX ADMIN — MORPHINE SULFATE 4 MG: 2 INJECTION, SOLUTION INTRAMUSCULAR; INTRAVENOUS at 08:30

## 2019-09-04 NOTE — DISCHARGE INSTRUCTIONS
You were evaluated in the emergency department for Right flank pain and found to have a 4mm kidney stone. Your examination was reassuring as was your work-up including CT scan, blood work, and urinalysis. It will be important for you to follow-up with your primary care physician in 2-5 days. If you develop worsening symptoms such as fevers, chills, worsening abdominal pain, or unable to keep food down, please return to the emergency department immediately.

## 2019-09-04 NOTE — ED NOTES
Assumed care from triage. Patient comes to the ED with right flank pain, N/V, and blood in his urine.  Patient states that he has a hx of kidney stones and this episode started this morning around 0300. n

## 2019-09-04 NOTE — ED PROVIDER NOTES
EMERGENCY DEPARTMENT HISTORY AND PHYSICAL EXAM      Date: 9/4/2019  Patient Name: Daniel De Leon III    History of Presenting Illness     Chief Complaint   Patient presents with    Flank Pain     pt reports right flank pain, nausea and blood in urine beginning early this morning, reports history of kidney stones       History Provided By: Patient    HPI: Es De Guzman, 35 y.o. male with history of hepatitis C, psychiatric disorder, and multiple kidney stones in the past requiring lithotripsy and stents presents to the ED with cc of right flank pain, right lower quadrant pain. Symptoms started at 3 AM this morning. Pain is constant but waxes and wanes. Is located to the right flank and radiates down into the right lower quadrant and right testicle region. Denies any fevers, chills, chest pain, shortness of breath. He does endorse hematuria, difficulty urinating, and urgency. He states he knows exactly what the symptoms are like and are very similar to his previous kidney stone presentations. He has required lithotripsy and stents in the past but is not currently followed by a urologist as his last stone was greater than 10 years ago. Denies any other recent illness. There are no other complaints, changes, or physical findings at this time. PCP: Tika Baptiste MD    No current facility-administered medications on file prior to encounter. Current Outpatient Medications on File Prior to Encounter   Medication Sig Dispense Refill    dextroamphetamine-amphetamine (ADDERALL) 15 mg tablet Take 0.5 Tabs by mouth two (2) times a day. Max Daily Amount: 15 mg. 30 Tab 0    divalproex DR (DEPAKOTE) 500 mg tablet Take 1 Tab by mouth two (2) times a day. 90 Tab 3    amoxicillin (AMOXIL) 500 mg capsule take 1 capsule by mouth three times a day until finished  0    QUEtiapine (SEROQUEL) 50 mg tablet Take 1 Tab by mouth nightly.  30 Tab 5    SUMAtriptan (IMITREX) 50 mg tablet One tab po at onset of headache. May repeat dose in 2 hours, but no more than 2 tabs in 24 hours 15 Tab 2    DULoxetine (CYMBALTA) 30 mg capsule Take 1 Cap by mouth daily. 30 Cap 12    promethazine (PHENERGAN) 25 mg tablet Take 1 Tab by mouth every six (6) hours as needed for Nausea. 30 Tab 0       Past History     Past Medical History:  Past Medical History:   Diagnosis Date    Arthritis     Hypertension     Ill-defined condition     Hep C    Other ill-defined conditions(799.89)     hepatitis C, kidney stones    Other ill-defined conditions(799.89)     DDD    Psychiatric disorder     prescription drug abuse       Past Surgical History:  Past Surgical History:   Procedure Laterality Date    COLONOSCOPY N/A 6/29/2016    COLONOSCOPY performed by Osmel Selby MD at hospitals ENDOSCOPY    HX LITHOTRIPSY      HX UROLOGICAL      lithotripsy       Family History:  Family History   Problem Relation Age of Onset    Heart Disease Mother     Heart Disease Father     Kidney Disease Sister        Social History:  Social History     Tobacco Use    Smoking status: Current Every Day Smoker     Packs/day: 0.25     Years: 20.00     Pack years: 5.00     Types: Cigarettes    Smokeless tobacco: Never Used    Tobacco comment: smokes 4 cigars daily   Substance Use Topics    Alcohol use: No     Comment: quit approximately 1yr ago    Drug use: Yes     Types: Opiates, Heroin, Prescription, Marijuana, Cocaine     Comment: pt has hx.  pt still does marijuana       Allergies: Allergies   Allergen Reactions    Ibuprofen Nausea and Vomiting     GI upset; bleeding of the stomach per patient     Nsaids (Non-Steroidal Anti-Inflammatory Drug) Anaphylaxis    Tramadol Seizures    Tylenol [Acetaminophen] Unproven on Challenge     Patient states that he cannot take tylenol because \"its bad for my liver\".  Pt reports Hep C and that MD had told him to not take tylenol in the past.          Review of Systems   Review of Systems   Constitutional: Negative for chills and fever. HENT: Negative. Eyes: Negative for visual disturbance. Respiratory: Negative for cough and shortness of breath. Cardiovascular: Negative for chest pain and leg swelling. Gastrointestinal: Positive for abdominal pain, nausea and vomiting. Genitourinary: Positive for flank pain and hematuria. Musculoskeletal: Negative for back pain and gait problem. Skin: Negative for color change and rash. Neurological: Negative for dizziness, weakness, light-headedness and headaches. Hematological: Does not bruise/bleed easily. Physical Exam   Physical Exam   Constitutional: He is oriented to person, place, and time. He appears well-developed and well-nourished. He appears distressed. HENT:   Head: Normocephalic and atraumatic. Eyes: Pupils are equal, round, and reactive to light. EOM are normal.   Neck: Normal range of motion. Neck supple. No JVD present. Cardiovascular: Normal rate, regular rhythm and normal heart sounds. No murmur heard. Pulmonary/Chest: Effort normal. No respiratory distress. He has no wheezes. He has no rales. Abdominal: Soft. He exhibits no distension. There is tenderness. There is guarding. There is no rebound. Musculoskeletal: Normal range of motion. He exhibits no edema. Right CVAT   Neurological: He is alert and oriented to person, place, and time. Skin: Skin is warm and dry. No rash noted. He is not diaphoretic. No erythema. Diagnostic Study Results     Labs -     Recent Results (from the past 12 hour(s))   CBC WITH AUTOMATED DIFF    Collection Time: 09/04/19  8:12 AM   Result Value Ref Range    WBC 13.5 (H) 4.1 - 11.1 K/uL    RBC 5.39 4. 10 - 5.70 M/uL    HGB 16.0 12.1 - 17.0 g/dL    HCT 47.7 36.6 - 50.3 %    MCV 88.5 80.0 - 99.0 FL    MCH 29.7 26.0 - 34.0 PG    MCHC 33.5 30.0 - 36.5 g/dL    RDW 13.8 11.5 - 14.5 %    PLATELET 038 821 - 212 K/uL    MPV 10.2 8.9 - 12.9 FL    NRBC 0.0 0  WBC    ABSOLUTE NRBC 0.00 0.00 - 0.01 K/uL NEUTROPHILS 82 (H) 32 - 75 %    LYMPHOCYTES 13 12 - 49 %    MONOCYTES 5 5 - 13 %    EOSINOPHILS 0 0 - 7 %    BASOPHILS 0 0 - 1 %    IMMATURE GRANULOCYTES 0 0.0 - 0.5 %    ABS. NEUTROPHILS 11.0 (H) 1.8 - 8.0 K/UL    ABS. LYMPHOCYTES 1.7 0.8 - 3.5 K/UL    ABS. MONOCYTES 0.7 0.0 - 1.0 K/UL    ABS. EOSINOPHILS 0.0 0.0 - 0.4 K/UL    ABS. BASOPHILS 0.0 0.0 - 0.1 K/UL    ABS. IMM. GRANS. 0.1 (H) 0.00 - 0.04 K/UL    DF AUTOMATED     METABOLIC PANEL, COMPREHENSIVE    Collection Time: 09/04/19  8:12 AM   Result Value Ref Range    Sodium 141 136 - 145 mmol/L    Potassium 3.7 3.5 - 5.1 mmol/L    Chloride 107 97 - 108 mmol/L    CO2 28 21 - 32 mmol/L    Anion gap 6 5 - 15 mmol/L    Glucose 110 (H) 65 - 100 mg/dL    BUN 18 6 - 20 MG/DL    Creatinine 0.98 0.70 - 1.30 MG/DL    BUN/Creatinine ratio 18 12 - 20      GFR est AA >60 >60 ml/min/1.73m2    GFR est non-AA >60 >60 ml/min/1.73m2    Calcium 8.9 8.5 - 10.1 MG/DL    Bilirubin, total 0.5 0.2 - 1.0 MG/DL    ALT (SGPT) 74 12 - 78 U/L    AST (SGOT) 47 (H) 15 - 37 U/L    Alk.  phosphatase 72 45 - 117 U/L    Protein, total 7.5 6.4 - 8.2 g/dL    Albumin 3.8 3.5 - 5.0 g/dL    Globulin 3.7 2.0 - 4.0 g/dL    A-G Ratio 1.0 (L) 1.1 - 2.2     LIPASE    Collection Time: 09/04/19  8:12 AM   Result Value Ref Range    Lipase 109 73 - 393 U/L   URINALYSIS W/ REFLEX CULTURE    Collection Time: 09/04/19  8:12 AM   Result Value Ref Range    Color DARK YELLOW      Appearance CLOUDY (A) CLEAR      Specific gravity 1.015 1.003 - 1.030      pH (UA) 8.5 (H) 5.0 - 8.0      Protein 100 (A) NEG mg/dL    Glucose 100 (A) NEG mg/dL    Ketone 40 (A) NEG mg/dL    Bilirubin NEGATIVE  NEG      Blood LARGE (A) NEG      Urobilinogen 1.0 0.2 - 1.0 EU/dL    Nitrites NEGATIVE  NEG      Leukocyte Esterase NEGATIVE  NEG      WBC 0-4 0 - 4 /hpf    RBC  0 - 5 /hpf    Epithelial cells FEW FEW /lpf    Bacteria 2+ (A) NEG /hpf    UA:UC IF INDICATED URINE CULTURE ORDERED (A) CNI      Amorphous Crystals FEW (A) NEG Radiologic Studies -   CT ABD PELV WO CONT   Final Result   IMPRESSION:      1. There is mild right hydroureteronephrosis with a 4 mm calculus distal right   ureter. 2. There are few tiny nonobstructing calculi in the kidneys. There is a 1.2 cm low-density in the left mid kidney may represent a cyst but is   indeterminate and can be further assessed with ultrasound or on follow-up   studies. CT Results  (Last 48 hours)               09/04/19 0847  CT ABD PELV WO CONT Final result    Impression:  IMPRESSION:       1. There is mild right hydroureteronephrosis with a 4 mm calculus distal right   ureter. 2. There are few tiny nonobstructing calculi in the kidneys. There is a 1.2 cm low-density in the left mid kidney may represent a cyst but is   indeterminate and can be further assessed with ultrasound or on follow-up   studies. Narrative:  EXAM: CT ABD PELV WO CONT       INDICATION: R flank pain, concern for R kidney stone       COMPARISON: 2016       CONTRAST:  None. TECHNIQUE:    Thin axial images were obtained through the abdomen and pelvis. Coronal and   sagittal reconstructions were generated. Oral contrast was not administered. CT   dose reduction was achieved through use of a standardized protocol tailored for   this examination and automatic exposure control for dose modulation. The absence of intravenous contrast material reduces the sensitivity for   evaluation of the solid parenchymal organs of the abdomen. FINDINGS:    LUNG BASES: Clear. INCIDENTALLY IMAGED HEART AND MEDIASTINUM: Unremarkable. LIVER: No mass or biliary dilatation. GALLBLADDER: Unremarkable. SPLEEN: No mass. PANCREAS: No mass or ductal dilatation. ADRENALS: Unremarkable. KIDNEYS/URETERS: There are 2 tiny nonobstructing calculi in each kidney   measuring 1 mm. There is mild right hydroureteronephrosis. There is a 4 mm calculus distal right   ureter.        There is a 1.2 cm low-density in the right mid kidney   STOMACH: Unremarkable. SMALL BOWEL: No dilatation or wall thickening. COLON: No dilatation or wall thickening. APPENDIX: Unremarkable. PERITONEUM: No ascites or pneumoperitoneum. RETROPERITONEUM: No lymphadenopathy or aortic aneurysm. REPRODUCTIVE ORGANS: Unremarkable   URINARY BLADDER: No mass or calculus. BONES: No destructive bone lesion. ADDITIONAL COMMENTS: N/A               CXR Results  (Last 48 hours)    None          Medical Decision Making   I am the first provider for this patient. I reviewed the vital signs, available nursing notes, past medical history, past surgical history, family history and social history. Vital Signs-Reviewed the patient's vital signs. Patient Vitals for the past 12 hrs:   Temp Pulse Resp BP SpO2   09/04/19 1045  74  111/73 96 %   09/04/19 1015  75  116/72 96 %   09/04/19 0945  75  111/65 99 %   09/04/19 0930  73  112/64 97 %   09/04/19 0900  75  112/72 97 %   09/04/19 0813  72 18 124/77 100 %   09/04/19 0749 97.8 °F (36.6 °C) 70 18 (!) 138/95 100 %         Records Reviewed: Nursing Notes, Old Medical Records, Previous Radiology Studies and Previous Laboratory Studies    Provider Notes (Medical Decision Making): This is a 76-year-old male presenting with right flank pain and right lower quadrant pain and found to have right-sided nephrolithiasis resulting in mild hydroureter nephrosis. On examination he was initially in significant discomfort and pain, this resolved after administration of IV fluids, IV morphine, tamsulosin. He is afebrile vital signs are stable throughout entire ED stay. CT does demonstrate right distal ureteral stone 4 mm. On reexamination patient feeling much improved. He was given total of 2 doses of morphine. His urinalysis does not show any sign of infection with negative leukocyte esterase and negative nitrates with 0-5 WBC.   Additionally he is afebrile and appears clinically well nontoxic. He is tolerating p.o. intake at this time. He is stable for discharge with PCP follow-up. He is requesting small dose of pain medicine, I will provide x5 oxycodone 5 mg tablets until he can follow-up with PCP. He was given strict return ED precautions. He agrees to plan as above and all questions answered. Discharged home in stable condition. ED Course:   Initial assessment performed. The patients presenting problems have been discussed, and they are in agreement with the care plan formulated and outlined with them. I have encouraged them to ask questions as they arise throughout their visit. ED Course as of Sep 04 1130   Wed Sep 04, 2019   1022 On reexamination patient feeling much more comfortable but still in pain asking for another dose of pain medicine. CT results discussed with patient    [AK]      ED Course User Index  [AK] Linda Wilcox MD       Critical Care Time:   0    Disposition:  Home    PLAN:  1. Current Discharge Medication List      START taking these medications    Details   oxyCODONE IR (ROXICODONE) 5 mg immediate release tablet Take 1 Tab by mouth every six (6) hours as needed for Pain for up to 3 days. Max Daily Amount: 20 mg.  Qty: 5 Tab, Refills: 0    Associated Diagnoses: Kidney stone on right side           2. Follow-up Information     Follow up With Specialties Details Why Contact Info    Yi Bhatt MD [facility] Call  As needed, If symptoms worsen [place] [id number]  [phone]          Return to ED if worse     Diagnosis     Clinical Impression:   1. Kidney stone on right side        Attestations:    Mis Benedict MD    Please note that this dictation was completed with [employer], the computer voice recognition software. Quite often unanticipated grammatical, syntax, homophones, and other interpretive errors are inadvertently transcribed by the computer software. Please disregard these errors. Please excuse any errors that have escaped final proofreading. Thank you.

## 2019-09-04 NOTE — LETTER
[address] 
[facility] EMERGENCY DEPT 
[address] [place] [id number] 
[phone] Work/School Note Date: 9/4/2019 To Whom It May concern: 
 
Isac Kimball III was seen and treated today in the Emergency Department by the following provider(s): 
Attending Provider: Kaya Nelson MD.   
 
Isac Kimball III may return to work on 9/5/19. Sincerely, Beny Campos MD

## 2019-09-05 LAB
BACTERIA SPEC CULT: NORMAL
CC UR VC: NORMAL
SERVICE CMNT-IMP: NORMAL

## 2019-09-19 ENCOUNTER — OFFICE VISIT (OUTPATIENT)
Dept: FAMILY MEDICINE CLINIC | Age: 39
End: 2019-09-19

## 2019-09-19 VITALS
DIASTOLIC BLOOD PRESSURE: 84 MMHG | SYSTOLIC BLOOD PRESSURE: 132 MMHG | WEIGHT: 168.4 LBS | BODY MASS INDEX: 24.11 KG/M2 | HEART RATE: 79 BPM | TEMPERATURE: 97.7 F | OXYGEN SATURATION: 100 % | RESPIRATION RATE: 14 BRPM | HEIGHT: 70 IN

## 2019-09-19 DIAGNOSIS — F90.2 ATTENTION DEFICIT HYPERACTIVITY DISORDER (ADHD), COMBINED TYPE: ICD-10-CM

## 2019-09-19 RX ORDER — DEXTROAMPHETAMINE SACCHARATE, AMPHETAMINE ASPARTATE, DEXTROAMPHETAMINE SULFATE AND AMPHETAMINE SULFATE 3.75; 3.75; 3.75; 3.75 MG/1; MG/1; MG/1; MG/1
15 TABLET ORAL DAILY
Qty: 30 TAB | Refills: 0 | Status: SHIPPED | OUTPATIENT
Start: 2019-09-19 | End: 2019-10-18

## 2019-09-19 RX ORDER — DEXTROAMPHETAMINE SACCHARATE, AMPHETAMINE ASPARTATE, DEXTROAMPHETAMINE SULFATE AND AMPHETAMINE SULFATE 3.75; 3.75; 3.75; 3.75 MG/1; MG/1; MG/1; MG/1
15 TABLET ORAL DAILY
Qty: 30 TAB | Refills: 0 | Status: SHIPPED | OUTPATIENT
Start: 2019-10-19 | End: 2019-11-17

## 2019-09-19 RX ORDER — DEXTROAMPHETAMINE SACCHARATE, AMPHETAMINE ASPARTATE, DEXTROAMPHETAMINE SULFATE AND AMPHETAMINE SULFATE 3.75; 3.75; 3.75; 3.75 MG/1; MG/1; MG/1; MG/1
7.5 TABLET ORAL 2 TIMES DAILY
Qty: 30 TAB | Refills: 0 | Status: SHIPPED | OUTPATIENT
Start: 2019-09-19 | End: 2019-12-19 | Stop reason: DRUGHIGH

## 2019-09-19 RX ORDER — METOPROLOL TARTRATE 50 MG/1
TABLET ORAL
Refills: 3 | COMMUNITY
Start: 2019-09-04 | End: 2020-01-06

## 2019-09-19 RX ORDER — DEXTROAMPHETAMINE SACCHARATE, AMPHETAMINE ASPARTATE, DEXTROAMPHETAMINE SULFATE AND AMPHETAMINE SULFATE 3.75; 3.75; 3.75; 3.75 MG/1; MG/1; MG/1; MG/1
15 TABLET ORAL DAILY
Qty: 30 TAB | Refills: 0 | Status: SHIPPED | OUTPATIENT
Start: 2019-11-18 | End: 2019-12-19 | Stop reason: ALTCHOICE

## 2019-09-19 NOTE — PROGRESS NOTES
HISTORY OF PRESENT ILLNESS  Gutierrez Dowling III is a 35 y.o. male. HPI passed a kidney stone 2 weeks ago. Has also had upper teeth pulled within the last month. Is going to have lower teeth pulled also. Has also been having some pockets of fluid on lower back. Started working drilling deep wells. ROS    Physical Exam   Constitutional: He appears well-developed and well-nourished. HENT:   Right Ear: External ear normal.   Left Ear: External ear normal.   Mouth/Throat: Oropharynx is clear and moist.   Neck: No thyromegaly present. Cardiovascular: Normal rate, regular rhythm, normal heart sounds and intact distal pulses. Pulmonary/Chest: Effort normal and breath sounds normal. No respiratory distress. He has no wheezes. Abdominal: Soft. Bowel sounds are normal. He exhibits no distension and no mass. There is no tenderness. There is no guarding. Musculoskeletal: Normal range of motion. He exhibits no edema. Lymphadenopathy:     He has no cervical adenopathy. Nursing note and vitals reviewed. ASSESSMENT and PLAN  Orders Placed This Encounter    dextroamphetamine-amphetamine (ADDERALL) 15 mg tablet    dextroamphetamine-amphetamine (ADDERALL) 15 mg tablet    dextroamphetamine-amphetamine (ADDERALL) 15 mg tablet    dextroamphetamine-amphetamine (ADDERALL) 15 mg tablet     Diagnoses and all orders for this visit:    1. Attention deficit hyperactivity disorder (ADHD), combined type  -     dextroamphetamine-amphetamine (ADDERALL) 15 mg tablet; Take 1 Tab by mouth daily for 29 days. Max Daily Amount: 15 mg.  -     dextroamphetamine-amphetamine (ADDERALL) 15 mg tablet; Take 1 Tab by mouth daily for 29 days. Max Daily Amount: 15 mg.  -     dextroamphetamine-amphetamine (ADDERALL) 15 mg tablet; Take 1 Tab by mouth daily for 59 days. Max Daily Amount: 15 mg.  -     dextroamphetamine-amphetamine (ADDERALL) 15 mg tablet; Take 0.5 Tabs by mouth two (2) times a day. Max Daily Amount: 15 mg.

## 2019-09-19 NOTE — PROGRESS NOTES
Chief Complaint   Patient presents with   [facility] Follow-up     right flank pain   Kidney stone  ED [facility]  9/4/19    Cyst     \"feels like pockets of air medial flanks near spine\" pt said      1. Have you been to the ER, urgent care clinic since your last visit? Hospitalized since your last visit? No    2. Have you seen or consulted any other health care providers outside of the [address] [place] [address] since your last visit? Include any pap smears or colon screening.  No     Health Maintenance Due   Topic Date Due    Pneumococcal 0-64 years (1 of 1 - PPSV23) [birth year]1985

## 2019-09-26 RX ORDER — DIVALPROEX SODIUM 500 MG/1
TABLET, DELAYED RELEASE ORAL
Qty: 90 TAB | Refills: 3 | Status: SHIPPED | OUTPATIENT
Start: 2019-09-26 | End: 2020-02-03

## 2019-12-19 ENCOUNTER — OFFICE VISIT (OUTPATIENT)
Dept: FAMILY MEDICINE CLINIC | Age: 39
End: 2019-12-19

## 2019-12-19 VITALS
DIASTOLIC BLOOD PRESSURE: 95 MMHG | WEIGHT: 169 LBS | HEART RATE: 65 BPM | HEIGHT: 70 IN | BODY MASS INDEX: 24.2 KG/M2 | OXYGEN SATURATION: 97 % | SYSTOLIC BLOOD PRESSURE: 132 MMHG | RESPIRATION RATE: 18 BRPM | TEMPERATURE: 97.9 F

## 2019-12-19 DIAGNOSIS — F90.2 ATTENTION DEFICIT HYPERACTIVITY DISORDER (ADHD), COMBINED TYPE: ICD-10-CM

## 2019-12-19 RX ORDER — DEXTROAMPHETAMINE SACCHARATE, AMPHETAMINE ASPARTATE, DEXTROAMPHETAMINE SULFATE AND AMPHETAMINE SULFATE 7.5; 7.5; 7.5; 7.5 MG/1; MG/1; MG/1; MG/1
TABLET ORAL
Qty: 30 TAB | Refills: 0 | Status: SHIPPED | OUTPATIENT
Start: 2019-12-19 | End: 2020-05-06 | Stop reason: SDUPTHER

## 2019-12-19 RX ORDER — DEXTROAMPHETAMINE SACCHARATE, AMPHETAMINE ASPARTATE, DEXTROAMPHETAMINE SULFATE AND AMPHETAMINE SULFATE 3.75; 3.75; 3.75; 3.75 MG/1; MG/1; MG/1; MG/1
15 TABLET ORAL DAILY
Qty: 30 TAB | Refills: 0 | Status: CANCELLED | OUTPATIENT
Start: 2019-12-19 | End: 2020-02-16

## 2019-12-19 NOTE — PROGRESS NOTES
HISTORY OF PRESENT ILLNESS  Deric Valle III is a 35 y.o. male. 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 Keeping busy. Living in [facility]. Thinks that needs to have his adderall dose increased. Says that adderall helps him focus and calms him down. Says that is working 60 hours a week. Has been going to asap classes for a dui. Also goes to [employer]. ROS    Physical Exam  Vitals signs and nursing note reviewed. Constitutional:       Appearance: He is well-developed. HENT:      Right Ear: External ear normal.      Left Ear: External ear normal.   Neck:      Thyroid: No thyromegaly. Cardiovascular:      Rate and Rhythm: Normal rate and regular rhythm. Heart sounds: Normal heart sounds. Pulmonary:      Effort: Pulmonary effort is normal. No respiratory distress. Breath sounds: Normal breath sounds. No wheezing. Abdominal:      General: Bowel sounds are normal. There is no distension. Palpations: Abdomen is soft. There is no mass. Tenderness: There is no tenderness. There is no guarding. Musculoskeletal: Normal range of motion. Lymphadenopathy:      Cervical: No cervical adenopathy. ASSESSMENT and PLAN  Orders Placed This Encounter    dextroamphetamine-amphetamine (ADDERALL) 30 mg tablet     Diagnoses and all orders for this visit:    1. Attention deficit hyperactivity disorder (ADHD), combined type  -     dextroamphetamine-amphetamine (ADDERALL) 30 mg tablet; 1/2 tab po bid      Follow-up and Dispositions    · Return in about 3 months (around 3/19/2020).

## 2019-12-19 NOTE — PROGRESS NOTES
Chief Complaint   Patient presents with    Medication Refill     3 month follow up     1. Have you been to the ER, urgent care clinic since your last visit? Hospitalized since your last visit? No    2. Have you seen or consulted any other health care providers outside of the [address] [place] [address] since your last visit? Include any pap smears or colon screening.  No

## 2019-12-26 ENCOUNTER — HOSPITAL ENCOUNTER (EMERGENCY)
Age: 39
Discharge: HOME | End: 2019-12-26
Attending: EMERGENCY MEDICINE | Admitting: EMERGENCY MEDICINE
Payer: MEDICAID

## 2019-12-26 VITALS
TEMPERATURE: 96 F | DIASTOLIC BLOOD PRESSURE: 107 MMHG | HEIGHT: 69 IN | WEIGHT: 167.33 LBS | BODY MASS INDEX: 24.78 KG/M2 | HEART RATE: 68 BPM | SYSTOLIC BLOOD PRESSURE: 149 MMHG | OXYGEN SATURATION: 99 % | RESPIRATION RATE: 16 BRPM

## 2019-12-26 DIAGNOSIS — H16.133 WELDERS' FLASH, BILATERAL: Primary | ICD-10-CM

## 2019-12-26 PROCEDURE — 74011000250 HC RX REV CODE- 250: Performed by: PHYSICIAN ASSISTANT

## 2019-12-26 PROCEDURE — 99283 EMERGENCY DEPT VISIT LOW MDM: CPT

## 2019-12-26 PROCEDURE — 74011250637 HC RX REV CODE- 250/637: Performed by: PHYSICIAN ASSISTANT

## 2019-12-26 RX ORDER — TETRACAINE HYDROCHLORIDE 5 MG/ML
1 SOLUTION OPHTHALMIC
Status: COMPLETED | OUTPATIENT
Start: 2019-12-26 | End: 2019-12-26

## 2019-12-26 RX ORDER — ERYTHROMYCIN 5 MG/G
OINTMENT OPHTHALMIC
Qty: 3.5 G | Refills: 0 | Status: SHIPPED | OUTPATIENT
Start: 2019-12-26 | End: 2020-01-02

## 2019-12-26 RX ORDER — HYDROCODONE BITARTRATE AND ACETAMINOPHEN 5; 325 MG/1; MG/1
1 TABLET ORAL
Status: COMPLETED | OUTPATIENT
Start: 2019-12-26 | End: 2019-12-26

## 2019-12-26 RX ADMIN — FLUORESCEIN SODIUM 1 STRIP: 1 STRIP OPHTHALMIC at 12:53

## 2019-12-26 RX ADMIN — TETRACAINE HYDROCHLORIDE 1 DROP: 5 SOLUTION OPHTHALMIC at 13:13

## 2019-12-26 RX ADMIN — HYDROCODONE BITARTRATE AND ACETAMINOPHEN 1 TABLET: 5; 325 TABLET ORAL at 13:56

## 2019-12-26 NOTE — LETTER
[address] 
\[facility]\"" EMERGENCY DEPT 
[address] [facility] [place] [id number] [phone] Work/School Note Date: 12/26/2019 To Whom It May concern: 
 
Francheska Sanchez was seen and treated today in the emergency room by the following provider(s): 
Attending Provider: Renaldo Duncan MD 
Physician Assistant: Nu Ba PA-C. Francheska Sanchez may return to work on 12/29/19. Sincerely, Annika Dumas PA-C

## 2019-12-26 NOTE — ED NOTES
WALESKA Giron at bedside to provide discharge paperwork. Vital signs stable. Pt in no apparent distress at this time. Mental status at baseline. Ambulatory to waiting room with steady gate, discharge paperwork in hand. Patient and or family acknowledged and signed discharge instructions that were created/provided by the Physician. Signed discharge form with patient label placed in scan box. Accompanied by family to drive pt home.

## 2019-12-26 NOTE — DISCHARGE INSTRUCTIONS
Patient Education        UV Keratitis: Care Instructions  Your Care Instructions    Keratitis is a swelling of the cornea. The cornea is the outer, clear layer that covers the colored part of your eye and pupil. Ultraviolet light can burn your eye and cause keratitis. Bright sunlight can do this if you don't wear sunglasses that block ultraviolet light. This is especially true when the sun's rays reflect off snow or water, or when you look directly into the sun for a long time. The problem can also be caused by bright light from welding equipment (also known as \"'s flash\"), tanning booths, and sunlamps. These can burn your eyes if you don't wear eye protection. Your doctor may have put a few drops of medicine into your eye to prevent infection and scarring. Your doctor may also have given you an eye patch or a special type of contact lens to wear while your eye heals. It may take 24 hours after the burn to know how much of your eye was affected. Your vision may be blurry and your eye may hurt and feel irritated. These symptoms should start to get better within a few days. Follow-up care is a key part of your treatment and safety. Be sure to make and go to all appointments, and call your doctor if you are having problems. It is also a good idea to know your test results and keep a list of the medicines you take. How can you care for yourself at home? · If your doctor gave you eyedrops, use them as directed. Antibiotic eyedrops help prevent infection and scarring. Use the medicine for as long as your doctor tells you to, even if your eye starts to look and feel better. Keep the bottle tip clean. Do not let it touch the eye area. · Be sure to only use the eyedrops your doctor prescribed. Do not use over-the-counter eyedrops. They may make your symptoms worse. · To put in eyedrops:  ? Tilt your head back, and pull your lower eyelid down with one finger. ?  Drop or squirt the medicine inside the lower lid.  ? Close your eye for 30 to 60 seconds to let the drops move around. ? Do not touch the eyedropper tip to your eyelashes or any other surface. · Put a cold washcloth over your eye to help reduce swelling. Do this for 15 minutes 3 or 4 times a day for the first 24 to 48 hours after a burn to your eye. If you use a small ice pack, place a cloth between the ice and your skin. Do not use chemical cooling packs on or near your eye. If the pack leaks, the chemicals could cause more harm to your eye. After the swelling goes down, put a warm washcloth over your eye to help relieve pain. · Do not wear contact lenses or eye makeup until your doctor says it is okay. If you were wearing disposable contacts when the burn happened, throw them away. Use a new pair when your eye has healed and it is safe to wear them again. · Take an over-the-counter pain medicine, such as acetaminophen (Tylenol), ibuprofen (Advil, Motrin), or naproxen (Aleve), as needed. Read and follow all instructions on the label. · If you feel like you have something in your eye, don't rub it. This could cause more harm to your eye. To prevent ultraviolet burns to your eye:  · Wear sunglasses that block ultraviolet rays. Do this even on cloudy days and in the winter. · Protect your eyes from the sun's glare when you are boating, sunbathing, or skiing. · Wear a mask or goggles designed for welding if you are welding or are near someone who is welding. · Use goggles to protect your eyes when you use tanning booths or sunlamps. When should you call for help? Call your doctor now or seek immediate medical care if:    · You have signs of an eye infection, such as:  ? Pus or thick discharge coming from the eye.  ? Redness or swelling around the eye.  ?  A fever.     · You have new or worse eye pain.     · You have vision changes.     · It feels like there is something in your eye.     · Light hurts your eye.    Watch closely for changes in your health, and be sure to contact your doctor if:    · You do not get better as expected. Where can you learn more? Go to http://manav-kota.info/. Enter L244 in the search box to learn more about \"UV Keratitis: Care Instructions. \"  Current as of: May 5, 2019  Content Version: 12.2  © 1633-5632 [employer]. Care instructions adapted under license by [employer] (which disclaims liability or warranty for this information). If you have questions about a medical condition or this instruction, always ask your healthcare professional. [address] any warranty or liability for your use of this information.

## 2019-12-26 NOTE — ED PROVIDER NOTES
EMERGENCY DEPARTMENT HISTORY AND PHYSICAL EXAM      Date: 12/26/2019  Patient Name: John Cristina III    History of Presenting Illness     Chief Complaint   Patient presents with    Eye Pain     Welding and was not wearing a wheeler and has and eye burn       History Provided By: Patient    HPI: Latrice Cortes, 35 y.o. male with PMHx significant for drug abuse, hypertension arthritis. 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 He has had some increasing in tears and states that sunlight does hurt his eyes more. He denies any complete loss of vision, foreign body sensation however states that they feel like they are burning. He is tried Visine which has not seemed to help his eyes. He denies any contact eyewear or any glasses. Please note for examination and HPI were completed I did introduce myself as the physician assistant. Please note that this dictation was completed with CloudSync, the computer voice recognition software. Quite often unanticipated grammatical, syntax, homophones, and other interpretive errors are inadvertently transcribed by the computer software. Please disregard these errors. Please excuse any errors that have escaped final proofreading. For further clarification on any chart please contact myself. Thank you. There are no other complaints, changes, or physical findings at this time. PCP: Rolanda Watson MD    No current facility-administered medications on file prior to encounter. Current Outpatient Medications on File Prior to Encounter   Medication Sig Dispense Refill    dextroamphetamine-amphetamine (ADDERALL) 30 mg tablet 1/2 tab po bid 30 Tab 0    divalproex DR (DEPAKOTE) 500 mg tablet TAKE 1 TABLET BY MOUTH THREE TIMES A DAY 90 Tab 3    metoprolol tartrate (LOPRESSOR) 50 mg tablet TAKE 1 TAB BY MOUTH TWO (2) TIMES A DAY.  FOR BLOOD PRESSURE  3    divalproex DR (DEPAKOTE) 500 mg tablet Take 1 Tab by mouth two (2) times a day. (Patient taking differently: Take 500 mg by mouth two (2) times a day. Indications: Patient only taking once a day) 90 Tab 3    DULoxetine (CYMBALTA) 30 mg capsule Take 1 Cap by mouth daily. 30 Cap 12    promethazine (PHENERGAN) 25 mg tablet Take 1 Tab by mouth every six (6) hours as needed for Nausea. 30 Tab 0       Past History     Past Medical History:  Past Medical History:   Diagnosis Date    Arthritis     Hypertension     Ill-defined condition     Hep C    Infectious disease     Hep C    Other ill-defined conditions(799.89)     hepatitis C, kidney stones    Other ill-defined conditions(799.89)     DDD    Psychiatric disorder     prescription drug abuse       Past Surgical History:  Past Surgical History:   Procedure Laterality Date    COLONOSCOPY N/A 6/29/2016    COLONOSCOPY performed by Betsy [facility] MD at [facility]COPY    HX LITHOTRIPSY      HX UROLOGICAL      lithotripsy       Family History:  Family History   Problem Relation Age of Onset    Heart Disease Mother     Heart Disease Father     Kidney Disease Sister        Social History:  Social History     Tobacco Use    Smoking status: Current Every Day Smoker     Packs/day: 0.50     Years: 20.00     Pack years: 10.00     Types: Cigarettes    Smokeless tobacco: Never Used    Tobacco comment: smokes 4 cigars daily   Substance Use Topics    Alcohol use: No     Comment: quit approximately 1yr ago    Drug use: Yes     Types: Opiates, Heroin, Prescription, Marijuana, Cocaine     Comment: pt has hx.  pt still does marijuana       Allergies: Allergies   Allergen Reactions    Ibuprofen Nausea and Vomiting     GI upset; bleeding of the stomach per patient     Nsaids (Non-Steroidal Anti-Inflammatory Drug) Anaphylaxis    Tramadol Seizures    Tylenol [Acetaminophen] Unproven on Challenge     Patient states that he cannot take tylenol because \"its bad for my liver\".  Pt reports Hep C and that MD had told him to not take tylenol in the past.          Review of Systems   Review of Systems   Eyes: Positive for photophobia, pain and redness. All other systems reviewed and are negative. Physical Exam   Physical Exam  Vitals signs and nursing note reviewed. Constitutional:       Appearance: He is well-developed. HENT:      Head: Normocephalic and atraumatic. Eyes:      Conjunctiva/sclera: Conjunctivae normal.      Pupils: Pupils are equal, round, and reactive to light. Comments: Erythematous conjunctivo-appreciated bilaterally. With increasing lacrimation. Fluorescein stain exam did not reveal any uptake to the cornea. No foreign body objects appreciated. Jose A-Pen pressures 14 left eye on 2 readings 95% accuracy and right eye 15 on 2 readings with 95% accuracy. Negative Tricia sign, no signs of hyphema, hypopyon. Neck:      Musculoskeletal: Normal range of motion and neck supple. Thyroid: No thyromegaly. Cardiovascular:      Rate and Rhythm: Normal rate and regular rhythm. Heart sounds: Normal heart sounds. No murmur. Pulmonary:      Effort: Pulmonary effort is normal. No respiratory distress. Breath sounds: Normal breath sounds. No stridor. No wheezing. Musculoskeletal: Normal range of motion. General: No tenderness. Lymphadenopathy:      Cervical: No cervical adenopathy. Skin:     General: Skin is warm. Neurological:      Mental Status: He is alert and oriented to person, place, and time. Deep Tendon Reflexes: Reflexes are normal and symmetric. Psychiatric:         Judgment: Judgment normal.         Diagnostic Study Results     Labs -   No results found for this or any previous visit (from the past 12 hour(s)). Radiologic Studies -   No orders to display     CT Results  (Last 48 hours)    None        CXR Results  (Last 48 hours)    None            Medical Decision Making   I am the first provider for this patient.     I reviewed the vital signs, available nursing notes, past medical history, past surgical history, family history and social history. Vital Signs-Reviewed the patient's vital signs. Patient Vitals for the past 12 hrs:   Temp Pulse Resp BP SpO2   12/26/19 1235 96 °F (35.6 °C) 68 16 (!) 149/107 99 %       Records Reviewed: Nursing Notes    Provider Notes (Medical Decision Making): At this time patient has clear signs of 's flash. I adventurous findings on the fluorescein stain exam.  I did advise patient he should wear eye protection, including sunglasses until symptoms improve. We will treat symptomatically with erythromycin ointment as prophylaxis according to up-to-date and also artificial tears and also bed rest.  Patient is instructed to follow-up with eye specialist return for any new or worsening complications low clinical suspicion for any closed angle glaucoma. ED Course:   Initial assessment performed. The patients presenting problems have been discussed, and they are in agreement with the care plan formulated and outlined with them. I have encouraged them to ask questions as they arise throughout their visit. Critical Care Time: None    Disposition:  Disposition for home    PLAN:  1. Current Discharge Medication List      START taking these medications    Details   erythromycin (ILOTYCIN) ophthalmic ointment Use 1/2 inch ribbon 3-4 times a day for 3 days. Qty: 3.5 g, Refills: 0      carboxymethylcellulose sodium (ARTIFICIAL TEARS, [facility],) 1 % drop Apply 2 Drops to eye four (4) times daily for 4 days.   Qty: 15 mL, Refills: 0         CONTINUE these medications which have NOT CHANGED    Details   dextroamphetamine-amphetamine (ADDERALL) 30 mg tablet 1/2 tab po bid  Qty: 30 Tab, Refills: 0    Associated Diagnoses: Attention deficit hyperactivity disorder (ADHD), combined type      !! divalproex DR (DEPAKOTE) 500 mg tablet TAKE 1 TABLET BY MOUTH THREE TIMES A DAY  Qty: 90 Tab, Refills: 3      metoprolol tartrate (LOPRESSOR) 50 mg tablet TAKE 1 TAB BY MOUTH TWO (2) TIMES A DAY. FOR BLOOD PRESSURE  Refills: 3      !! divalproex DR (DEPAKOTE) 500 mg tablet Take 1 Tab by mouth two (2) times a day. Qty: 90 Tab, Refills: 3      DULoxetine (CYMBALTA) 30 mg capsule Take 1 Cap by mouth daily. Qty: 30 Cap, Refills: 12      promethazine (PHENERGAN) 25 mg tablet Take 1 Tab by mouth every six (6) hours as needed for Nausea. Qty: 30 Tab, Refills: 0       !! - Potential duplicate medications found. Please discuss with provider. 2.   Follow-up Information     Follow up With Specialties Details Why Contact Info    [facility]    [facility] [address] [address] [address] Autumn Alvares MD Ophthalmology   [address]  [phone]          Return to ED if worse     Diagnosis     Clinical Impression:   1. Welders' flash, bilateral          Please note that this dictation was completed with Casper, the computer voice recognition software. Quite often unanticipated grammatical, syntax, homophones, and other interpretive errors are inadvertently transcribed by the computer software. Please disregards these errors. Please excuse any errors that have escaped final proofreading. This note will not be viewable in [address]

## 2019-12-26 NOTE — ED TRIAGE NOTES
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

## 2020-01-06 RX ORDER — METOPROLOL TARTRATE 50 MG/1
TABLET ORAL
Qty: 180 TAB | Refills: 3 | Status: SHIPPED | OUTPATIENT
Start: 2020-01-06 | End: 2021-03-15

## 2020-01-06 RX ORDER — QUETIAPINE FUMARATE 50 MG/1
TABLET, FILM COATED ORAL
Qty: 30 TAB | Refills: 5 | Status: SHIPPED | OUTPATIENT
Start: 2020-01-06 | End: 2020-07-30 | Stop reason: ALTCHOICE

## 2020-02-03 RX ORDER — DIVALPROEX SODIUM 500 MG/1
TABLET, DELAYED RELEASE ORAL
Qty: 90 TAB | Refills: 3 | Status: SHIPPED | OUTPATIENT
Start: 2020-02-03 | End: 2020-06-11

## 2020-05-06 ENCOUNTER — VIRTUAL VISIT (OUTPATIENT)
Dept: FAMILY MEDICINE CLINIC | Age: 40
End: 2020-05-06

## 2020-05-06 DIAGNOSIS — F90.2 ATTENTION DEFICIT HYPERACTIVITY DISORDER (ADHD), COMBINED TYPE: ICD-10-CM

## 2020-05-06 DIAGNOSIS — F51.01 PRIMARY INSOMNIA: Primary | ICD-10-CM

## 2020-05-06 RX ORDER — TRAZODONE HYDROCHLORIDE 100 MG/1
100 TABLET ORAL
Qty: 30 TAB | Refills: 2 | Status: SHIPPED | OUTPATIENT
Start: 2020-05-06 | End: 2020-09-24 | Stop reason: ALTCHOICE

## 2020-05-06 RX ORDER — DEXTROAMPHETAMINE SACCHARATE, AMPHETAMINE ASPARTATE, DEXTROAMPHETAMINE SULFATE AND AMPHETAMINE SULFATE 7.5; 7.5; 7.5; 7.5 MG/1; MG/1; MG/1; MG/1
TABLET ORAL
Qty: 30 TAB | Refills: 0 | Status: SHIPPED | OUTPATIENT
Start: 2020-05-06 | End: 2020-07-13 | Stop reason: SDUPTHER

## 2020-05-06 NOTE — PROGRESS NOTES
HISTORY OF PRESENT ILLNESS  Tyrel Onofre III is a 35 y.o. male. HPI Consent:  Pt. was seen by synchronous (real-time) audio- video technololgy, and/or her healthcare decision maker, is aware that this patient-initiated Telehealth encounter on   is a billable service, with coverage as determined by her insurance carrier. They are aware that they may receive a bill. Pt broke up with his girlfriend and now cant sleep at night. Needs refill on adderall. 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 Has finished his probation. Used to take seroquel to sleep, but says that it makes him groggy the next day. adderall helps him focus at work. ROS    Physical Exam    ASSESSMENT and PLAN  Orders Placed This Encounter    dextroamphetamine-amphetamine (ADDERALL) 30 mg tablet    traZODone (DESYREL) 100 mg tablet     Diagnoses and all orders for this visit:    1. Primary insomnia    2. Attention deficit hyperactivity disorder (ADHD), combined type  -     dextroamphetamine-amphetamine (ADDERALL) 30 mg tablet; 1/2 tab po bid    Other orders  -     traZODone (DESYREL) 100 mg tablet; Take 1 Tab by mouth nightly. Pt was evaluated by a video visit encounter for concerns as discussed above. A caregiver was present when appropriate. Due to this being a TeleHealth encounter ([address] [place] [address]) physical exam was limited. Pursuant to the emergency declaration under the [address] [place] [address], this Virtual Visit was conducted, with patients (and/or legal guardians's) consent, to reduce the patient's risk of exposure to COVID -19 and provide necessary medical care. Services were provided through a video synchronous discussion virtually to substitute for in-person clinic visit. Patient and provider were located at their individual homes.

## 2020-05-06 NOTE — PROGRESS NOTES
HISTORY OF PRESENT ILLNESS  Barbie Montalvo III is a 35 y.o. male.   HPI    ROS    Physical Exam    ASSESSMENT and PLAN  see above note

## 2020-05-06 NOTE — PROGRESS NOTES
HISTORY OF PRESENT ILLNESS  Tyrel Onofre III is a 35 y.o. male.   HPI     ROS    Physical Exam    ASSESSMENT and PLAN  see above note

## 2020-05-06 NOTE — PROGRESS NOTES
Chief Complaint   Patient presents with    Follow Up Chronic Condition     Patient would like refill on Adderrall  And also prescription.

## 2020-06-11 RX ORDER — DIVALPROEX SODIUM 500 MG/1
TABLET, DELAYED RELEASE ORAL
Qty: 90 TAB | Refills: 3 | Status: SHIPPED | OUTPATIENT
Start: 2020-06-11 | End: 2020-07-30 | Stop reason: ALTCHOICE

## 2020-06-15 RX ORDER — DULOXETIN HYDROCHLORIDE 30 MG/1
CAPSULE, DELAYED RELEASE ORAL
Qty: 30 CAP | Refills: 12 | Status: SHIPPED | OUTPATIENT
Start: 2020-06-15 | End: 2021-01-25

## 2020-07-13 DIAGNOSIS — F90.2 ATTENTION DEFICIT HYPERACTIVITY DISORDER (ADHD), COMBINED TYPE: ICD-10-CM

## 2020-07-13 RX ORDER — DEXTROAMPHETAMINE SACCHARATE, AMPHETAMINE ASPARTATE, DEXTROAMPHETAMINE SULFATE AND AMPHETAMINE SULFATE 7.5; 7.5; 7.5; 7.5 MG/1; MG/1; MG/1; MG/1
TABLET ORAL
Qty: 30 TAB | Refills: 0 | Status: SHIPPED | OUTPATIENT
Start: 2020-07-13 | End: 2020-09-24 | Stop reason: ALTCHOICE

## 2020-07-13 NOTE — TELEPHONE ENCOUNTER
Patient mother is requesting a RX refill dextroamphetamine-amphetamine(ADDERALL) 30 mg tablet she can be reached @ [phone]

## 2020-07-30 ENCOUNTER — OFFICE VISIT (OUTPATIENT)
Dept: FAMILY MEDICINE CLINIC | Age: 40
End: 2020-07-30

## 2020-07-30 VITALS
OXYGEN SATURATION: 98 % | HEIGHT: 69 IN | RESPIRATION RATE: 16 BRPM | BODY MASS INDEX: 24.05 KG/M2 | WEIGHT: 162.4 LBS | DIASTOLIC BLOOD PRESSURE: 93 MMHG | SYSTOLIC BLOOD PRESSURE: 148 MMHG | TEMPERATURE: 96.9 F | HEART RATE: 59 BPM

## 2020-07-30 DIAGNOSIS — F90.2 ATTENTION DEFICIT HYPERACTIVITY DISORDER (ADHD), COMBINED TYPE: Primary | ICD-10-CM

## 2020-07-30 DIAGNOSIS — B18.2 CHRONIC HEPATITIS C WITHOUT HEPATIC COMA (HCC): ICD-10-CM

## 2020-07-30 RX ORDER — DEXTROAMPHETAMINE SACCHARATE, AMPHETAMINE ASPARTATE, DEXTROAMPHETAMINE SULFATE AND AMPHETAMINE SULFATE 5; 5; 5; 5 MG/1; MG/1; MG/1; MG/1
20 TABLET ORAL 2 TIMES DAILY
Qty: 60 TAB | Refills: 0 | Status: SHIPPED | OUTPATIENT
Start: 2020-07-30 | End: 2020-08-26 | Stop reason: SDUPTHER

## 2020-07-30 NOTE — PROGRESS NOTES
HISTORY OF PRESENT ILLNESS  Adelaide Allen III is a 38 y.o. male. HPI Has been having problems with temper. Girlfriend broke up with him. Says that adderall wears off. 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 Sleeping ok at night. Had been seeing her for over a year and a half. Currently living with mother. Older sister and her boyfriend live with them. Pt has also been told that has Hep C and wants to see specialist for this. Was dxed when tried to give blood. ROS    Physical Exam  Vitals signs and nursing note reviewed. Constitutional:       Appearance: He is well-developed. HENT:      Right Ear: External ear normal.      Left Ear: External ear normal.   Neck:      Thyroid: No thyromegaly. Cardiovascular:      Rate and Rhythm: Normal rate and regular rhythm. Heart sounds: Normal heart sounds. Pulmonary:      Effort: Pulmonary effort is normal. No respiratory distress. Breath sounds: Normal breath sounds. No wheezing. Abdominal:      General: Bowel sounds are normal. There is no distension. Palpations: Abdomen is soft. There is no mass. Tenderness: There is no abdominal tenderness. There is no guarding. Musculoskeletal: Normal range of motion. Lymphadenopathy:      Cervical: No cervical adenopathy. ASSESSMENT and PLAN  Orders Placed This Encounter    HEPATITIS C AB    HEPATITIS C QT BY PCR WITH REFLEX GENOTYPE    dextroamphetamine-amphetamine (ADDERALL) 20 mg tablet     Diagnoses and all orders for this visit:    1. Attention deficit hyperactivity disorder (ADHD), combined type  -     dextroamphetamine-amphetamine (ADDERALL) 20 mg tablet; Take 1 Tab by mouth two (2) times a day.  Max Daily Amount: 40 mg.    2. Chronic hepatitis C without hepatic coma (HCC)  -     HEPATITIS C AB  -     HEPATITIS C QT BY PCR WITH REFLEX GENOTYPE

## 2020-07-30 NOTE — PROGRESS NOTES
Chief Complaint   Patient presents with    Medication Refill     1. Have you been to the ER, urgent care clinic since your last visit? Hospitalized since your last visit? No    2. Have you seen or consulted any other health care providers outside of the [address] [place] [address] since your last visit? Include any pap smears or colon screening. Yes [facility]     Patient here for med refill. Broke up with girlfriend recently and having hard time with emotions. Crying or anger.

## 2020-08-04 ENCOUNTER — TELEPHONE (OUTPATIENT)
Dept: FAMILY MEDICINE CLINIC | Age: 40
End: 2020-08-04

## 2020-08-04 DIAGNOSIS — B18.2 CHRONIC HEPATITIS C WITHOUT HEPATIC COMA (HCC): Primary | ICD-10-CM

## 2020-08-04 NOTE — TELEPHONE ENCOUNTER
----- Message from Es Laura sent at 8/4/2020  2:11 PM EDT -----  Regarding: Dr. Alda Busch first and last name:Junior Bloom      Reason for call:test results      Callback required yes/no and why:yes      Best contact number(s):[phone]      Details to clarify the request:patient would like to get his blood test results      Es Laura

## 2020-08-04 NOTE — TELEPHONE ENCOUNTER
----- Message from Camron Newton sent at 8/4/2020  4:38 PM EDT -----  Regarding: Dr. Laurence Helms first and last name:Junior Bloom      Reason for call: blood test results      Callback required yes/no and why:yes      Best contact number(s):[phone]      Details to clarify the request: patient wants to get his blood test results      Camron Newton

## 2020-08-05 LAB
HCV AB S/CO SERPL IA: >11 S/CO RATIO (ref 0–0.9)
HCV GENOTYPE: NORMAL
HCV GENTYP SERPL NAA+PROBE: NORMAL
HCV RNA SERPL NAA+PROBE-ACNC: NORMAL IU/ML
HCV RNA SERPL NAA+PROBE-LOG IU: 6.54 LOG10 IU/ML
PLEASE NOTE, 550474: NORMAL
TEST INFORMATION: NORMAL

## 2020-08-06 ENCOUNTER — TELEPHONE (OUTPATIENT)
Dept: FAMILY MEDICINE CLINIC | Age: 40
End: 2020-08-06

## 2020-08-06 LAB
HCV GENOTYPE: NORMAL
HCV GENTYP SERPL NAA+PROBE: NORMAL
HCV RNA SERPL NAA+PROBE-ACNC: NORMAL IU/ML
HCV RNA SERPL NAA+PROBE-LOG IU: 6.72 LOG10 IU/ML
PLEASE NOTE, 550474: NORMAL
TEST INFORMATION: NORMAL

## 2020-08-06 NOTE — TELEPHONE ENCOUNTER
----- Message from Aimee Hui sent at 8/5/2020  5:32 PM EDT -----  Regarding: Dr. Leroy Calles Message/Vendor Calls    Caller's first and last name:  pt    Reason for call:  Requesting to speak with provider. Callback required yes/no and why:  Requesting     Best contact number(s):  [phone]    Details to clarify the request:  Pt requesting to speak with the provider in regards to Hep C and treatment.      Aimee Hui

## 2020-08-26 DIAGNOSIS — F90.2 ATTENTION DEFICIT HYPERACTIVITY DISORDER (ADHD), COMBINED TYPE: ICD-10-CM

## 2020-08-26 RX ORDER — DEXTROAMPHETAMINE SACCHARATE, AMPHETAMINE ASPARTATE, DEXTROAMPHETAMINE SULFATE AND AMPHETAMINE SULFATE 5; 5; 5; 5 MG/1; MG/1; MG/1; MG/1
20 TABLET ORAL 2 TIMES DAILY
Qty: 60 TAB | Refills: 0 | Status: SHIPPED | OUTPATIENT
Start: 2020-08-26 | End: 2020-09-24 | Stop reason: SDUPTHER

## 2020-08-26 NOTE — TELEPHONE ENCOUNTER
----- Message from Aftab Thomas sent at 8/26/2020 11:15 AM EDT -----  Regarding: Dr. Mishel Joseph: [phone]  [facility] (if not patient): Tone Coates   Relationship of caller (if not patient): Pts mom   Best contact number(s): [phone]  Name of medication and dosage if known: Adderall 20mg  Is patient out of this medication (yes/no): yes  Pharmacy name: [facility]  Pharmacy listed in chart? (yes/no): yes  Pharmacy phone number: [phone]  Date of last visit: 7/30/20  Details to clarify the request: Requesting prescription be faxed over to pharmacy.

## 2020-09-24 ENCOUNTER — OFFICE VISIT (OUTPATIENT)
Dept: FAMILY MEDICINE CLINIC | Age: 40
End: 2020-09-24
Payer: COMMERCIAL

## 2020-09-24 VITALS
SYSTOLIC BLOOD PRESSURE: 115 MMHG | RESPIRATION RATE: 16 BRPM | TEMPERATURE: 98.6 F | HEART RATE: 62 BPM | BODY MASS INDEX: 23.52 KG/M2 | OXYGEN SATURATION: 97 % | HEIGHT: 69 IN | WEIGHT: 158.8 LBS | DIASTOLIC BLOOD PRESSURE: 76 MMHG

## 2020-09-24 DIAGNOSIS — F90.2 ATTENTION DEFICIT HYPERACTIVITY DISORDER (ADHD), COMBINED TYPE: ICD-10-CM

## 2020-09-24 DIAGNOSIS — M75.82 TENDINITIS OF LEFT ROTATOR CUFF: Primary | ICD-10-CM

## 2020-09-24 PROCEDURE — 99213 OFFICE O/P EST LOW 20 MIN: CPT | Performed by: FAMILY MEDICINE

## 2020-09-24 RX ORDER — METHYLPREDNISOLONE 4 MG/1
TABLET ORAL
Qty: 1 DOSE PACK | Refills: 0 | Status: SHIPPED | OUTPATIENT
Start: 2020-09-24 | End: 2020-10-12

## 2020-09-24 RX ORDER — PROMETHAZINE HYDROCHLORIDE 25 MG/1
25 TABLET ORAL
Qty: 30 TAB | Refills: 0 | Status: SHIPPED | OUTPATIENT
Start: 2020-09-24 | End: 2020-12-03

## 2020-09-24 RX ORDER — DEXTROAMPHETAMINE SACCHARATE, AMPHETAMINE ASPARTATE, DEXTROAMPHETAMINE SULFATE AND AMPHETAMINE SULFATE 5; 5; 5; 5 MG/1; MG/1; MG/1; MG/1
20 TABLET ORAL 2 TIMES DAILY
Qty: 60 TAB | Refills: 0 | Status: SHIPPED | OUTPATIENT
Start: 2020-09-24 | End: 2020-11-20 | Stop reason: SDUPTHER

## 2020-09-24 NOTE — LETTER
Suzanna Quintero III 
YTO:3/0/[birth year]25 MR #:[id number] [address] [place] [address] Drive Page 1 of 5 CONTROLLED SUBSTANCE AGREEMENT I may be prescribed medications that are controlled substances as part  of my treatment plan for management of my medical condition(s). The goal of my treatment plan is to maintain and/or improve my health and wellbeing. Because controlled substances have an increased risk of abuse or harm, continual re-evaluation is needed determine if the goals of my treatment plan are being met for my safety and the safety of others. IShahana  am entering into this Controlled Substance Agreement with my provider, __________________________________ at [facility] . I understand that successful treatment requires mutual trust and honesty between me and my provider. I understand that there are state and federal laws and regulations which apply to the medications that my provider may prescribe that must be followed. I understand there are risks and benefits ts of taking the medicines that my provider may prescribe. I understand and agree that following this Agreement is necessary in continuing my provider-patient relationship and success of my treatment plan. As a part of my treatment plan, I agree to the following: COMMUNICATION: 
 
1. I will communicate fully with my provider about my medical condition(s), including the effect on my daily life and how well my medications are helping. I will tell my provider all of the medications that I take for any reason, including medications I receive from another health care provider, and will notify my provider about all issues, problems or concerns, including any side effects, which may be related to my medications. I understand that this information allows my provider to adjust my treatment plan to help manage my medical condition.  I understand that this information will become part of my permanent medical record. 2. I will notify my provider if I have a history of alcohol/drug misuse/addiction or if I have had treatment for alcohol/drug addiction in the past, or if I have a new problem with or concern about alcohol/drug use/addiction, because this increases the likelihood of high risk behaviors and may lead to serious medical conditions. 3. Females Only: I will notify my provider if I am or become pregnant, or if I intend to become pregnant, or if I intend to breastfeed. I understand that communication of these issues with my provider is important, due to possible effects my medication could have on an unborn fetus or breastfeeding child. Initials_____ Renate Bah III  
SZF:2/0/0671 MR #:[id number] [address] [place] [address] Page 2 of 5 MISUSE OF MEDICATIONS / DRUGS: 
 
1. I agree to take all controlled substances as prescribed, and will not misuse or abuse any controlled substances prescribed by my provider. For my safety, I will not increase the amount of medicine I take without first talking with and getting permission from my provider. 2. If I have a medical emergency, another health care provider may prescribe me medication. If I seek emergency treatment, I will notify my provider within seventy-two (72) hours. 3. I understand that my provider may discuss my use and/or possible misuse/abuse of controlled substances and alcohol, as appropriate, with any health care provider involved in my care, pharmacist or legal authority. ILLEGAL DRUGS: 
 
1. I will not use illegal drugs of any kind, including but not limited to marijuana, heroin, cocaine, or any prescription drug which is not prescribed to me. DRUG DIVERSION / PRESCRIPTION FRAUD: 
 
1. I will not share, sell, trade, give away, or otherwise misuse my prescriptions or medications. 2. I will not alter any prescriptions provided to me by my provider. SINGLE PROVIDER: 
 
1. I agree that all controlled substances that I take will be prescribed only by my provider (or his/her covering provider) under this Agreement. This agreement does not prevent me from seeking emergency medical treatment or receiving pain management related to a surgery. PROTECTING MEDICATIONS: 
 
1. I am responsible for keeping my prescriptions and medications in a safe and secure place including safeguarding them from loss or theft. I understand that lost, stolen or damaged/destroyed prescriptions or medications will not be replaced. Initials____ Tasia Rojas III  
VDP:6/6/0117 MR #:[id number] [address] [place] [address] Page 3 of 5 PRESCRIPTION RENEWALS/REFILLS: 
 
1. I will follow my controlled substance medication schedule as prescribed by my provider. 2. I understand and agree that I will make any requests for renewals or refills of my prescriptions only at the time of an office visit or during my providers regular office hours subject to the prescription refill requirements of the individual practice. 3. I understand that my provider may not call in prescriptions for controlled substances to my pharmacy. 4. I understand that my provider may adjust or discontinue these medications as deemed appropriate for my medical treatment plan. This Agreement does not guarantee the prescription of controlled medications. 5. I agree that if my medications are adjusted or discontinued, I will properly dispose of any remaining medications. I understand that I will be required to dispose of any remaining controlled medications prior to being provided with any prescriptions for other controlled medications. 6. I understand that the renewal of my prescription depends on my medical condition, my consistent participation, and my adherence with my treatment plan and this Agreement.  
 
7. I understand that if I do not keep an appointment with my provider, I may not receive a renewal or refill for my controlled substance medication. PRESCRIPTION MONITORING / DRUG TESTIN. I understand that my provider may require me to provide urine, saliva or blood for testing at any time. I understand that this testing will be used to monitor for safety and adherence with my treatment plan and this Agreement. 2. I understand that my provider may ask me to provide an observed urine specimen, which means that a nurse or other health care provider may watch me provide urine, and I agree to cooperate if I am asked to provide an observed specimen. 3. I understand that if I do not provide urine, saliva or blood samples within two (2) hours of my providers request, or other timeframe decided by my provider, my treatment plan could be changed, or my prescriptions and medications may be changed or ended. 4. I understand that urine, saliva and blood test results will be a part of my permanent medical record. Initials_____ Darius Puentes III  
AKV:4597 MR #:[id number] [address] [place] [address] Drive Page 4 of 5 
 
5. I understand that my provider is required to obtain a copy of my State Prescription Monitoring Program () Report at any time in order to safely prescribe medications. 6. I will bring all of my prescribed controlled substance medications in their original bottles to all of my scheduled appointments. 7. I understand that my provider may ask me to come to the practice with all of my prescribed medications for a random pill count at any time. I agree to cooperate if I am asked to come in for a random pill count. I understand that if I do not arrive in the timeframe decided by my provider, my treatment plan could be changed, or my prescriptions and medications may be changed or ended.  
 
COOPERATION WITH INVESTIGATIONS: 
 
1. I authorize my provider and my pharmacy to cooperate fully with any local, state, or federal law enforcement agency in the investigation of any possible misuse, sale, or other diversion of my controlled substance prescriptions or medications. RISKS: 
 
1. I understand that my level of consciousness may be affected from the use of controlled substances, and I understand that there are risks, benefits, effects and potential alternatives (including no treatment) to the medications that my provider has prescribed. 2. I understand that I may become drowsy, tired, dizzy, constipated, and sick to my stomach, or have changes in my mood or in my sleep while taking my medications. I have talked with my provider about these possible side effects, risks, benefits, and alternative treatments, and my provider has answered all of my questions. 3. I understand that I should not suddenly stop taking my medications without first speaking with my provider. I understand that if I suddenly stop taking my medications, I may experience nausea, vomiting, sweating,anxiety, sleeplessness, itching or other uncomfortable feelings. 4. I will not take my medications with alcohol of any kind, including beer, wine or liquor. I understand that drinking alcohol with my medications increases the chances of side effects, including breathing problems or even death. 5. I understand that if I have a history of alcoholism or other drug addiction I may be at increased risk of addiction to my medications. Signs of addiction might include craving, compulsive use, and continued use despite harm. Since addiction is a disease, I understand my provider may decide to change my medications and refer me to appropriate treatment services. I understand that this information would become part of my permanent medical record. Initials_____ Suki Kenyon III  
LSO:4/3/7148 MR #:[id number] [address] [place] [address] Page 5 of 5  
 
 
 
1. I understand that if I do not adhere to this Agreement in any way, my provider may change my prescriptions, stop prescribing controlled substances or end our provider-patient relationship. 2. If my provider decides to stop prescribing medication, or decides to end our provider-patient relationship,my provider may require that I taper my medications slowly. If necessary, my provider may also provide a prescription for other medications to treat my withdrawal symptoms. UNDERSTANDING THIS AGREEMENT: 
 
I understand that my provider may adjust or stop my prescriptions for controlled substances based on my medical condition and my treatment plan. I understand that this Agreement does not guarantee that I will be prescribed medications or controlled substances. I understand that controlled substances may be just one part 
of my treatment plan. My initial on each page and my signature below shows that I have read each page of this Agreement, I have had an opportunity to ask questions, and all of my questions have been answered to my satisfaction by my provider. By signing below, I agree to comply with this Agreement, and I understand that if I do not follow the Agreements listed above, my provider may stop 
 
_________________________________________  Date/Time 9/24/2020 4:39 PM   
             (Patient Signature) 
 
________________________________________    Date/Time 9/24/2020 4:39 PM 
 (Parent or Guardian Signature if <18 yrs) 
 
_________________________________________ Date/Time 9/24/2020 4:39 PM 
 (Provider Signature)

## 2020-09-24 NOTE — PROGRESS NOTES
Chief Complaint   Patient presents with    Follow-up    Shoulder Pain     1. Have you been to the ER, urgent care clinic since your last visit? Hospitalized since your last visit? No    2. Have you seen or consulted any other health care providers outside of the [address] [place] [address] since your last visit? Include any pap smears or colon screening. Yes [facility] [address]     Patient notes left shoulder pain. Fell days ago but pain started days after fall. Patient commented on neck pain.

## 2020-09-24 NOTE — PROGRESS NOTES
HISTORY OF PRESENT ILLNESS  Teodoro Milan III is a 38 y.o. male. HPI One week hx L shoulder pain. No hx trauma. Still on methadone at methadone clinic. Shoulder pain is constant. Some nighttime pain. Not working at present. STaying with mother at present. ROS    Physical Exam  Vitals signs and nursing note reviewed. Constitutional:       Appearance: He is well-developed. HENT:      Right Ear: External ear normal.      Left Ear: External ear normal.   Neck:      Thyroid: No thyromegaly. Cardiovascular:      Rate and Rhythm: Normal rate and regular rhythm. Heart sounds: Normal heart sounds. Pulmonary:      Effort: Pulmonary effort is normal. No respiratory distress. Breath sounds: Normal breath sounds. No wheezing. Abdominal:      General: Bowel sounds are normal. There is no distension. Palpations: Abdomen is soft. There is no mass. Tenderness: There is no abdominal tenderness. There is no guarding. Musculoskeletal: Normal range of motion. Comments: L shoulder- decreased rom. Positive impingement sign   Lymphadenopathy:      Cervical: No cervical adenopathy. ASSESSMENT and PLAN  Orders Placed This Encounter    promethazine (PHENERGAN) 25 mg tablet    methylPREDNISolone (MEDROL DOSEPACK) 4 mg tablet    dextroamphetamine-amphetamine (ADDERALL) 20 mg tablet     Diagnoses and all orders for this visit:    1. Tendinitis of left rotator cuff    2. Attention deficit hyperactivity disorder (ADHD), combined type  -     dextroamphetamine-amphetamine (ADDERALL) 20 mg tablet; Take 1 Tab by mouth two (2) times a day. Max Daily Amount: 40 mg. Other orders  -     promethazine (PHENERGAN) 25 mg tablet; Take 1 Tab by mouth every six (6) hours as needed for Nausea. -     methylPREDNISolone (MEDROL DOSEPACK) 4 mg tablet;  As directed

## 2020-09-25 ENCOUNTER — OFFICE VISIT (OUTPATIENT)
Dept: HEMATOLOGY | Age: 40
End: 2020-09-25
Payer: COMMERCIAL

## 2020-09-25 VITALS
SYSTOLIC BLOOD PRESSURE: 139 MMHG | TEMPERATURE: 98.5 F | HEIGHT: 69 IN | RESPIRATION RATE: 16 BRPM | BODY MASS INDEX: 23.4 KG/M2 | HEART RATE: 49 BPM | OXYGEN SATURATION: 96 % | DIASTOLIC BLOOD PRESSURE: 85 MMHG | WEIGHT: 158 LBS

## 2020-09-25 DIAGNOSIS — B18.2 CHRONIC HEPATITIS C WITHOUT HEPATIC COMA (HCC): Primary | ICD-10-CM

## 2020-09-25 PROBLEM — F98.8 ADD (ATTENTION DEFICIT DISORDER): Status: ACTIVE | Noted: 2020-09-25

## 2020-09-25 LAB
ALBUMIN SERPL-MCNC: 3.5 G/DL (ref 3.5–5)
ALBUMIN/GLOB SERPL: 1.1 {RATIO} (ref 1.1–2.2)
ALP SERPL-CCNC: 72 U/L (ref 45–117)
ALT SERPL-CCNC: 56 U/L (ref 12–78)
ANION GAP SERPL CALC-SCNC: 2 MMOL/L (ref 5–15)
AST SERPL-CCNC: 40 U/L (ref 15–37)
BASOPHILS # BLD: 0 K/UL (ref 0–0.1)
BASOPHILS NFR BLD: 0 % (ref 0–1)
BILIRUB DIRECT SERPL-MCNC: 0.1 MG/DL (ref 0–0.2)
BILIRUB SERPL-MCNC: 0.2 MG/DL (ref 0.2–1)
BUN SERPL-MCNC: 14 MG/DL (ref 6–20)
BUN/CREAT SERPL: 20 (ref 12–20)
CALCIUM SERPL-MCNC: 9.1 MG/DL (ref 8.5–10.1)
CHLORIDE SERPL-SCNC: 107 MMOL/L (ref 97–108)
CO2 SERPL-SCNC: 33 MMOL/L (ref 21–32)
CREAT SERPL-MCNC: 0.7 MG/DL (ref 0.7–1.3)
DIFFERENTIAL METHOD BLD: ABNORMAL
EOSINOPHIL # BLD: 0.1 K/UL (ref 0–0.4)
EOSINOPHIL NFR BLD: 2 % (ref 0–7)
ERYTHROCYTE [DISTWIDTH] IN BLOOD BY AUTOMATED COUNT: 13.7 % (ref 11.5–14.5)
GLOBULIN SER CALC-MCNC: 3.2 G/DL (ref 2–4)
GLUCOSE SERPL-MCNC: 88 MG/DL (ref 65–100)
HBV SURFACE AB SER QL: NONREACTIVE
HBV SURFACE AB SER-ACNC: <3.1 MIU/ML
HBV SURFACE AG SER QL: <0.1 INDEX
HBV SURFACE AG SER QL: NEGATIVE
HCT VFR BLD AUTO: 42.7 % (ref 36.6–50.3)
HGB BLD-MCNC: 13.5 G/DL (ref 12.1–17)
IMM GRANULOCYTES # BLD AUTO: 0 K/UL (ref 0–0.04)
IMM GRANULOCYTES NFR BLD AUTO: 0 % (ref 0–0.5)
LYMPHOCYTES # BLD: 1.8 K/UL (ref 0.8–3.5)
LYMPHOCYTES NFR BLD: 38 % (ref 12–49)
MCH RBC QN AUTO: 28.4 PG (ref 26–34)
MCHC RBC AUTO-ENTMCNC: 31.6 G/DL (ref 30–36.5)
MCV RBC AUTO: 89.9 FL (ref 80–99)
MONOCYTES # BLD: 0.4 K/UL (ref 0–1)
MONOCYTES NFR BLD: 8 % (ref 5–13)
NEUTS SEG # BLD: 2.4 K/UL (ref 1.8–8)
NEUTS SEG NFR BLD: 52 % (ref 32–75)
NRBC # BLD: 0 K/UL (ref 0–0.01)
NRBC BLD-RTO: 0 PER 100 WBC
PLATELET # BLD AUTO: 147 K/UL (ref 150–400)
PMV BLD AUTO: 11.2 FL (ref 8.9–12.9)
POTASSIUM SERPL-SCNC: 4.6 MMOL/L (ref 3.5–5.1)
PROT SERPL-MCNC: 6.7 G/DL (ref 6.4–8.2)
RBC # BLD AUTO: 4.75 M/UL (ref 4.1–5.7)
RHEUMATOID FACT SERPL-ACNC: <10 IU/ML
SODIUM SERPL-SCNC: 142 MMOL/L (ref 136–145)
WBC # BLD AUTO: 4.8 K/UL (ref 4.1–11.1)

## 2020-09-25 PROCEDURE — 99203 OFFICE O/P NEW LOW 30 MIN: CPT | Performed by: INTERNAL MEDICINE

## 2020-09-25 NOTE — PROGRESS NOTES
[address] [facility], MD, MD Liu Phillip PA-C Durel Pagan, [facility]-BC     Rosanne LAWRENCE Mike, BannerNP-BC   Laurel Monet FNP-C    Steven Rodriguez, [facility]       Olive Ferro [facility] 136    at [place] [address], [address] [facility]    [address] [facility] [address].    [phone]    FAX: [address] [place] [address], [address], [address]    [phone]    FAX: [phone]       Patient Care Team:  Angelo Escobar MD as PCP - Mo Clark MD as PCP - [facility] Provider  Iraida Longo RN as Nurse Navigator  Abdullahi Gruber RN as Nurse Navigator      Problem List  Date Reviewed: 9/25/2020          Codes Class Noted    ADD (attention deficit disorder) ICD-10-CM: F98.8  ICD-9-CM: 314.00  9/25/2020        Peptic ulcer disease ICD-10-CM: K27.9  ICD-9-CM: 533.90  6/26/2016        Chronic viral hepatitis C ([facility] ICD-10-CM: B18.2  ICD-9-CM: 070.54  8/1/2013        Shoulder pain ICD-10-CM: M25.519  ICD-9-CM: 719.41  8/1/2013        Back pain, chronic ICD-10-CM: M54.9, G89.29  ICD-9-CM: 724.5, 338.29  8/1/2013        Knee pain, bilateral ICD-10-CM: M25.561, M25.562  ICD-9-CM: 719.46  8/1/2013        H/O intravenous drug use in remission ICD-10-CM: Z87.898  ICD-9-CM: 305.93  8/8/2012        Antisocial personality disorder ([facility] (Chronic) ICD-10-CM: F60.2  ICD-9-CM: 301.7  8/8/2012        Depression ICD-10-CM: F32.9  ICD-9-CM: 361  4/20/2011              The clinicians listed above have asked me to see Shawn Craig III in consultation regarding chronic HCV and its management. All medical records sent by the referring physicians were reviewed including imaging studies     The patient is a 38 y.o.   male who was found to have abnormalities in liver chemistries and subsequently tested positive for chronic HCV in 2000. Risk factors for acquiring HCV are IV drug use in 1990s - 2018. There was no history of acute icteric hepatitis at the time of these risk factors. CT scan of the liver was performed in 9/2019. The results of the imaging   demonstrated a normal appearing liver. An assessment of liver fibrosis with biopsy or elastography has not been performed. The patient has never received treatment for chronic HCV. The patient has the following symptoms which are thought to be due to the liver disease:  fatigue, pain in the right side over the liver, multiple joint pain. The patient is not currently experiencing the following symptoms of liver disease:  problems concentrating, swelling of the abdomen, swelling of the lower xtremities, hematemesis, hematochezia. The patient has limitations in functional activities which can be attributed to the liver disease and to other medical problems that are not related to the liver disease. ASSESSMENT AND PLAN:  Chronic HCV   Chronic HCV of unclear severity. AST is elevated. ALT is normal.  ALP is normal.  Liver function is normal.  The platelet count is normal.      Based upon laboratory studies and imaging the patient does not appear to have   cirrhosis. Will perform laboratory testing to monitor liver function and degree of liver injury. This included BMP, hepatic panel, CBC with platelet count,     HCV viral load, HCV genotype was perfomred in 7/2020. Will perform  serologic and virologic studies to assess for other causes of chronic liver disease. Will perform imaging of the liver with ultrasound. Will perform HCV Fibrosure to assess liver severity. Chronic HCV Treatment  The patient has not been treated for HCV. The patient has HCV genotype 1A.     The patient should be treated with Harvoni (sofasbuvir and ledipasvir), Mavyret (glecaprevir and piprentasvir) or Epclusa (sofosbuvir and valpitasvir) depending upon degree of liver fibrosis. The SVR/cure rate for is over 95%. Cryoglobulinemia  HCV induced cryoglobulinemia could be the cause of arthralgia and myalgia   Will check rheumatoid factor, cryoglobulins   The cryoglobulin assay is frequently negative even in persons with documented cyroglobulinemia. Patients cannot have HCV induced cryoglobulinemia if the rheumatoid factor is negative. Cryoglobulinemia and many of its symptoms can resolve with eradication/cure of HCV. NASIDs can be used to treat HCV induced arthalgias and myalgias. Screening for Hepatocellular Carcinoma  HCC screening is not necessary if the patient has no evidence of cirrhosis. Treatment of other medical problems in patients with chronic liver disease  There are no contraindications for the patient to take most medications that are necessary for treatment of other medical issues. Counseling for alcohol in patients with chronic liver disease  The patient was counseled regarding alcohol consumption and the effect of alcohol on chronic liver disease. The patient does not consume any significant amount of alcohol. Substance Use  The patient was counseled regarding the risk of overdose and death from using opioids. Discussed the risk of becoming reinfected with HCV once they are cured if they resume IV drug use or inhaling drugs nasally. The patient has not used drugs since 2018. Vaccinations   The need for vaccination against viral hepatitis A and B will be assessed with serologic and instituted as appropriate. Routine vaccinations against other bacterial and viral agents can be performed as indicated. Annual flu vaccination should be administered if indicated.       ALLERGIES  Allergies   Allergen Reactions    Ibuprofen Nausea and Vomiting     GI upset; bleeding of the stomach per patient     Nsaids (Non-Steroidal Anti-Inflammatory Drug) Anaphylaxis    Tramadol Seizures    Tylenol [Acetaminophen] Unproven on Challenge     Patient states that he cannot take tylenol because \"its bad for my liver\". Pt reports Hep C and that MD had told him to not take tylenol in the past.        MEDICATIONS  Current Outpatient Medications   Medication Sig    methadone HCl (METHADONE PO) Take 80 mg by mouth daily.  promethazine (PHENERGAN) 25 mg tablet Take 1 Tab by mouth every six (6) hours as needed for Nausea.  methylPREDNISolone (MEDROL DOSEPACK) 4 mg tablet As directed    dextroamphetamine-amphetamine (ADDERALL) 20 mg tablet Take 1 Tab by mouth two (2) times a day. Max Daily Amount: 40 mg.    DULoxetine (CYMBALTA) 30 mg capsule TAKE 1 CAPSULE BY MOUTH EVERY DAY    metoprolol tartrate (LOPRESSOR) 50 mg tablet TAKE 1 TAB BY MOUTH TWO (2) TIMES A DAY. FOR BLOOD PRESSURE    divalproex DR (DEPAKOTE) 500 mg tablet Take 1 Tab by mouth two (2) times a day. (Patient taking differently: Take 500 mg by mouth two (2) times a day. Indications: Patient only taking once a day)     No current facility-administered medications for this visit. SYSTEM REVIEW NOT RELATED TO LIVER DISEASE OR REVIEWED ABOVE:  Constitution systems: Negative for fever, chills, weight gain, weight loss. Eyes: Negative for visual changes. ENT: Negative for sore throat, painful swallowing. Respiratory: Negative for cough, hemoptysis, SOB. Cardiology: Negative for chest pain, palpitations. GI:  Negative for constipation or diarrhea. : Negative for urinary frequency, dysuria, hematuria, nocturia. Skin: Negative for rash. Hematology: Negative for easy bruising, blood clots. Musculo-skelatal: Negative for back pain, muscle pain, weakness. Neurologic: Negative for headaches, dizziness, vertigo, memory problems not related to HE. Psychology: Negative for anxiety, depression. FAMILY HISTORY:  The father  of CHF.     The mother Has/had the following chronic disease(s): anxiety, heart disease. There is no family history of liver disease. SOCIAL HISTORY:  The patient has never been . The patient has no children. The patient currently smokes 1/2 pack of tobacco daily. The patient has never consumed significant amounts of alcohol. The patient does not work. PHYSICAL EXAMINATION:  Visit Vitals  /85   Pulse (!) 49   Temp 98.5 °F (36.9 °C) (Temporal)   Resp 16   Ht 5' 9\" (1.753 m)   Wt 158 lb (71.7 kg)   SpO2 96%   BMI 23.33 kg/m²     General: No acute distress. Eyes: Sclera anicteric. ENT: No oral lesions. Thyroid normal.  Nodes: No adenopathy. Skin: No spider angiomata. No jaundice. No palmar erythema. Respiratory: Lungs clear to auscultation. Cardiovascular: Regular heart rate. No murmurs. No JVD. Abdomen: Soft non-tender. Liver size normal to percussion/palpation. Spleen not palpable. No obvious ascites. Extremities: No edema. No muscle wasting. No gross arthritic changes. Neurologic: Alert and oriented. Cranial nerves grossly intact. No asterixis. LABORATORY STUDIES:  [employer] of [address] & Units 9/4/2019 12/18/2018   WBC 4.1 - 11.1 K/uL 13.5 (H)    ANC 1.8 - 8.0 K/UL 11.0 (H)    HGB 12.1 - 17.0 g/dL 16.0     - 400 K/uL 205    AST 15 - 37 U/L 47 (H) 45 (H)   ALT 12 - 78 U/L 74 52 (H)   Alk Phos 45 - 117 U/L 72 76   Bili, Total 0.2 - 1.0 MG/DL 0.5 0.4   Albumin 3.5 - 5.0 g/dL 3.8 4.1   BUN 6 - 20 MG/DL 18 15   Creat 0.70 - 1.30 MG/DL 0.98 0.87   Na 136 - 145 mmol/L 141 143   K 3.5 - 5.1 mmol/L 3.7 4.9   Cl 97 - 108 mmol/L 107 105   CO2 21 - 32 mmol/L 28 26   Glucose 65 - 100 mg/dL 110 (H) 85       SEROLOGIES:  Serologies Latest Ref Rng & Units 7/30/2020   Hep C Genotype  1a   HCV RT-PCR, Quant IU/mL 3,440,000   HCV Log10 log10 IU/mL 6.537     LIVER HISTOLOGY:  Not available or performed    ENDOSCOPIC PROCEDURES:  Not available or performed    RADIOLOGY:  9/2019.   CT scan abdomen without IV contrast.  Normal appearing liver. No liver mass lesions. Normal spleen. No ascites. OTHER TESTING:  Not available or performed    FOLLOW-UP:  All of the issues listed above in the Assessment and Plan were discussed with the patient. All questions were answered. The patient expressed a clear understanding of the above. [address] [place] [address] in 2-4 weeks and to initiate HCV treatment.       Crista Friend MD  [address] [place] [address] 22.  [phone]  [address] [place] [address]

## 2020-09-25 NOTE — Clinical Note
9/25/20 Patient: Ralf Monique YOB: 1980 Date of Visit: 9/25/2020 Juancarlos Mann MD 
[address] [place] [id number] VIA In Basket Dear Juancarlos Mann MD, Thank you for referring Mr. Noralyn Litten to [address] [facility] for evaluation. My notes for this consultation are attached. If you have questions, please do not hesitate to call me. I look forward to following your patient along with you. Sincerely, Adela Montoya MD

## 2020-09-25 NOTE — PROGRESS NOTES
Identified pt with two pt identifiers(name and ). Reviewed record in preparation for visit and have obtained necessary documentation. Chief Complaint   Patient presents with    New Patient    Hepatitis C      Vitals:    20 0827 20 0833   BP: (!) 143/87 139/85   Pulse: (!) 49    Resp: 16    Temp: 98.5 °F (36.9 °C)    TempSrc: Temporal    SpO2: 96%    Weight: 158 lb (71.7 kg)    Height: 5' 9\" (1.753 m)    PainSc:   7    PainLoc: Shoulder        Health Maintenance Review: Patient reminded of \"due or due soon\" health maintenance. I have asked the patient to contact his/her primary care provider (PCP) for follow-up on his/her health maintenance. Coordination of Care Questionnaire:  :   1) Have you been to an emergency room, urgent care, or hospitalized since your last visit? If yes, where when, and reason for visit? no       2. Have seen or consulted any other health care provider since your last visit? If yes, where when, and reason for visit? NO      Patient is accompanied by self I have received verbal consent from Mayra Jeronimo III to discuss any/all medical information while they are present in the room.

## 2020-09-26 LAB
HAV AB SER QL IA: NEGATIVE
HBV CORE AB SERPL QL IA: POSITIVE

## 2020-09-29 LAB
A2 MACROGLOB SERPL-MCNC: 182 MG/DL (ref 110–276)
ALT (SGPT) P5P, 001547: 49 IU/L (ref 0–55)
APO A-I SERPL-MCNC: 138 MG/DL (ref 101–178)
BILIRUB SERPL-MCNC: 0.1 MG/DL (ref 0–1.2)
COMMENT, 550127: ABNORMAL
CRYOGLOB SER QL 1D COLD INC: NORMAL
FIBROSIS SCORE, 550102, HCVF1: 0.07 (ref 0–0.21)
FIBROSIS SCORING:, 550107: ABNORMAL
FIBROSIS STAGE, 550132: ABNORMAL
GGT SERPL-CCNC: 35 IU/L (ref 0–65)
HAPTOGLOB SERPL-MCNC: 111 MG/DL (ref 23–355)
INTERPRETATION, 550106: ABNORMAL
LIMITATIONS, 550105: ABNORMAL
NECROINFLAMM ACTIVITY SCORING:, 550121: ABNORMAL
NECROINFLAMMAT ACTIVITY GRADE, 550133: ABNORMAL
NECROINFLAMMAT ACTIVITY SCORE, 550103: 0.22 (ref 0–0.17)

## 2020-10-08 ENCOUNTER — HOSPITAL ENCOUNTER (OUTPATIENT)
Dept: ULTRASOUND IMAGING | Age: 40
Discharge: HOME | End: 2020-10-08
Attending: INTERNAL MEDICINE
Payer: MEDICAID

## 2020-10-08 DIAGNOSIS — B18.2 CHRONIC HEPATITIS C WITHOUT HEPATIC COMA (HCC): ICD-10-CM

## 2020-10-08 PROCEDURE — 76705 ECHO EXAM OF ABDOMEN: CPT

## 2020-10-12 ENCOUNTER — OFFICE VISIT (OUTPATIENT)
Dept: HEMATOLOGY | Age: 40
End: 2020-10-12
Payer: MEDICAID

## 2020-10-12 VITALS
TEMPERATURE: 96.2 F | OXYGEN SATURATION: 97 % | RESPIRATION RATE: 14 BRPM | HEIGHT: 69 IN | DIASTOLIC BLOOD PRESSURE: 70 MMHG | BODY MASS INDEX: 23.67 KG/M2 | SYSTOLIC BLOOD PRESSURE: 110 MMHG | HEART RATE: 47 BPM | WEIGHT: 159.8 LBS

## 2020-10-12 DIAGNOSIS — B18.2 CHRONIC HEPATITIS C WITHOUT HEPATIC COMA (HCC): Primary | ICD-10-CM

## 2020-10-12 LAB
HIV 1+2 AB+HIV1 P24 AG SERPL QL IA: NONREACTIVE
HIV12 RESULT COMMENT, HHIVC: NORMAL

## 2020-10-12 PROCEDURE — 99214 OFFICE O/P EST MOD 30 MIN: CPT | Performed by: NURSE PRACTITIONER

## 2020-10-12 NOTE — PROGRESS NOTES
Identified pt with two pt identifiers(name and ). Reviewed record in preparation for visit and have obtained necessary documentation. Chief Complaint   Patient presents with    Follow-up     F/U  Mike    Hepatitis C      Vitals:    10/12/20 1439   BP: 110/70   Pulse: (!) 47   Resp: 14   Temp: (!) 96.2 °F (35.7 °C)   TempSrc: Temporal   SpO2: 97%   Weight: 159 lb 12.8 oz (72.5 kg)   Height: 5' 9\" (1.753 m)   PainSc:   5   PainLoc: Leg       Health Maintenance Review: Patient reminded of \"due or due soon\" health maintenance. I have asked the patient to contact his/her primary care provider (PCP) for follow-up on his/her health maintenance. Coordination of Care Questionnaire:  :   1) Have you been to an emergency room, urgent care, or hospitalized since your last visit? If yes, where when, and reason for visit? no       2. Have seen or consulted any other health care provider since your last visit? If yes, where when, and reason for visit? NO      Patient is accompanied by self I have received verbal consent from [facility] to discuss any/all medical information while they are present in the room.

## 2020-10-12 NOTE — PROGRESS NOTES
[address] [place] [address], MD, MD Giuseppe Barker PA-C Karyl Hunger, [facility]     April S Mike, [facility]   ASH Lewis-DIANNE Bellamy, [facility]       [address]    at [address], [address] [address]  22.    [phone]    FAX: [address] [place] [address], [address], [address]    [phone]    FAX: [phone]       Patient Care Team:  Dary Nath MD as PCP - Mabel Enriquez MD as PCP - [facility]  Marin Larry RN as Nurse Navigator  Marjorie Escobar RN as Nurse Navigator      Problem List  Date Reviewed: 9/25/2020          Codes Class Noted    ADD (attention deficit disorder) ICD-10-CM: F98.8  ICD-9-CM: 314.00  9/25/2020        Peptic ulcer disease ICD-10-CM: K27.9  ICD-9-CM: 533.90  6/26/2016        Chronic viral hepatitis C ([facility].) ICD-10-CM: B18.2  ICD-9-CM: 070.54  8/1/2013        Shoulder pain ICD-10-CM: M25.519  ICD-9-CM: 719.41  8/1/2013        Back pain, chronic ICD-10-CM: M54.9, G89.29  ICD-9-CM: 724.5, 338.29  8/1/2013        Knee pain, bilateral ICD-10-CM: M25.561, M25.562  ICD-9-CM: 719.46  8/1/2013        H/O intravenous drug use in remission ICD-10-CM: Z87.898  ICD-9-CM: 305.93  8/8/2012        Antisocial personality disorder ([facility] (Chronic) ICD-10-CM: F60.2  ICD-9-CM: 301.7  8/8/2012        Depression ICD-10-CM: F32.9  ICD-9-CM: 314  4/20/2011            Chelo Bernal III is being seen at [address] for management of chronic HCV. The active problem list, all pertinent past medical history, medications, radiology and laboratory findings related to the liver disorder were reviewed with the patient.       The patient is a 38 y.o.  male who was found to have abnormalities in liver chemistries and subsequently tested positive for chronic HCV in 2000. Risk factors for acquiring HCV are IV drug use in 1990s - 2018. There was no history of acute icteric hepatitis at the time of these risk factors. CT scan of the liver was performed in 9/2019. The results of the imaging demonstrated a normal appearing liver. An assessment of liver fibrosis was performed with HCV Fibrosure. The score was 0.07 which correlates with no fibrosis. The patient has never received treatment for chronic HCV. The patient has the following symptoms which are thought to be due to the liver disease:  fatigue, pain in the right side over the liver, and multiple joint pain. The patient is not currently experiencing the following symptoms of liver disease:  problems concentrating, swelling of the abdomen, swelling of the lower xtremities, hematemesis, hematochezia. The patient has limitations in functional activities which can be attributed to the liver disease and to other medical problems that are not related to the liver disease. ASSESSMENT AND PLAN:  Chronic HCV   Chronic HCV with no fibrosis. Severity of the liver disease was assessed by laboratory studies, Imaging, and HCV Fibrosure 9/2020. Liver transaminases are elevated. ALP is normal.  Liver function is normal.  The platelet count is depressed. Based upon laboratory studies, HCV Fibrosure, and imaging  the patient does not appear to have significant liver injury. Have performed laboratory testing to monitor liver function and degree of liver injury. This included BMP, hepatic panel, and CBC with platelet count. Chronic HCV Treatment  The patient has not been treated for HCV. The patient has HCV genotype 1A. The patient should be treated with Nasir Baltazar (glecaprevir and piprentasvir) for 8 weeks.  Medication teaching completed including side effects and interactions. The SVR/cure rate for is over 95%. Cryoglobulinemia  RF and cryoglobulins were negative. Screening for Hepatocellular Carcinoma  HCC screening is not necessary if the patient has no evidence of cirrhosis. Treatment of other medical problems in patients with chronic liver disease  There are no contraindications for the patient to take most medications that are necessary for treatment of other medical issues. Counseling for alcohol in patients with chronic liver disease  The patient was counseled regarding alcohol consumption and the effect of alcohol on chronic liver disease. The patient does not consume any significant amount of alcohol. Substance Use  The patient was counseled regarding the risk of overdose and death from using opioids. Discussed the risk of becoming reinfected with HCV once they are cured if they resume IV drug use or inhaling drugs nasally. The patient has not used drugs since 2018. Vaccinations   The need for vaccination against viral hepatitis A and B will be assessed with serologic and instituted as appropriate. Routine vaccinations against other bacterial and viral agents can be performed as indicated. Annual flu vaccination should be administered if indicated. ALLERGIES  Allergies   Allergen Reactions    Ibuprofen Nausea and Vomiting     GI upset; bleeding of the stomach per patient     Nsaids (Non-Steroidal Anti-Inflammatory Drug) Anaphylaxis    Tramadol Seizures    Tylenol [Acetaminophen] Unproven on Challenge     Patient states that he cannot take tylenol because \"its bad for my liver\". Pt reports Hep C and that MD had told him to not take tylenol in the past.        MEDICATIONS  Current Outpatient Medications   Medication Sig    methadone HCl (METHADONE PO) Take 80 mg by mouth daily.  promethazine (PHENERGAN) 25 mg tablet Take 1 Tab by mouth every six (6) hours as needed for Nausea.     dextroamphetamine-amphetamine (ADDERALL) 20 mg tablet Take 1 Tab by mouth two (2) times a day. Max Daily Amount: 40 mg.    DULoxetine (CYMBALTA) 30 mg capsule TAKE 1 CAPSULE BY MOUTH EVERY DAY    metoprolol tartrate (LOPRESSOR) 50 mg tablet TAKE 1 TAB BY MOUTH TWO (2) TIMES A DAY. FOR BLOOD PRESSURE    divalproex DR (DEPAKOTE) 500 mg tablet Take 1 Tab by mouth two (2) times a day. (Patient taking differently: Take 500 mg by mouth two (2) times a day. Indications: Patient only taking once a day)    methylPREDNISolone (MEDROL DOSEPACK) 4 mg tablet As directed     No current facility-administered medications for this visit. SYSTEM REVIEW NOT RELATED TO LIVER DISEASE OR REVIEWED ABOVE:  Constitution systems: Negative for fever, chills, weight gain, weight loss. Eyes: Negative for visual changes. ENT: Negative for sore throat, painful swallowing. Respiratory: Negative for cough, hemoptysis, SOB. Cardiology: Negative for chest pain, palpitations. GI:  Negative for constipation or diarrhea. : Negative for urinary frequency, dysuria, hematuria, nocturia. Skin: Negative for rash. Hematology: Negative for easy bruising, blood clots. Musculo-skeletal: Negative for back pain, muscle pain, weakness. Neurologic: Negative for headaches, dizziness, vertigo, memory problems not related to HE. Psychology: Negative for anxiety, depression. FAMILY HISTORY:  The father  of CHF. The mother Has/had the following chronic disease(s): anxiety, heart disease. There is no family history of liver disease. SOCIAL HISTORY:  The patient has never been . The patient has no children. The patient currently smokes 1/2 pack of tobacco daily. The patient has never consumed significant amounts of alcohol. The patient does not work.         PHYSICAL EXAMINATION:  Visit Vitals  /70 (BP 1 Location: Right arm, BP Patient Position: Sitting)   Pulse (!) 47 Comment: Pt. States he has taken Methodone today   Temp (!) 96.2 °F (35.7 °C) (Temporal)   Resp 14   Ht 5' 9\" (1.753 m)   Wt 159 lb 12.8 oz (72.5 kg)   SpO2 97%   BMI 23.60 kg/m²     General: No acute distress. Eyes: Sclera anicteric. ENT: No oral lesions. Thyroid normal.  Nodes: No adenopathy. Skin: No spider angiomata. No jaundice. No palmar erythema. Respiratory: Lungs clear to auscultation. Cardiovascular: Regular heart rate. No murmurs. No JVD. Abdomen: Soft non-tender. Liver size normal to percussion/palpation. Spleen not palpable. No obvious ascites. Extremities: No edema. No muscle wasting. No gross arthritic changes. Neurologic: Alert and oriented. Cranial nerves grossly intact. No asterixis.     LABORATORY STUDIES:  [address] & Units 9/25/2020   WBC 4.1 - 11.1 K/uL 4.8   ANC 1.8 - 8.0 K/UL 2.4   HGB 12.1 - 17.0 g/dL 13.5    - 400 K/uL 147 (L)   AST 15 - 37 U/L 40 (H)   ALT 0 - 55 IU/L 56   Alk Phos 45 - 117 U/L 72   Bili, Total 0.0 - 1.2 mg/dL 0.2   Bili, Direct 0.0 - 0.2 MG/DL 0.1   Albumin 3.5 - 5.0 g/dL 3.5   BUN 6 - 20 MG/DL 14   Creat 0.70 - 1.30 MG/DL 0.70   Na 136 - 145 mmol/L 142   K 3.5 - 5.1 mmol/L 4.6   Cl 97 - 108 mmol/L 107   CO2 21 - 32 mmol/L 33 (H)   Glucose 65 - 100 mg/dL 88     Liver [place] of [address] [place] [address] Ref Rng & Units 9/4/2019 12/18/2018   WBC 4.1 - 11.1 K/uL 13.5 (H)    ANC 1.8 - 8.0 K/UL 11.0 (H)    HGB 12.1 - 17.0 g/dL 16.0     - 400 K/uL 205    AST 15 - 37 U/L 47 (H) 45 (H)   ALT 12 - 78 U/L 74 52 (H)   Alk Phos 45 - 117 U/L 72 76   Bili, Total 0.2 - 1.0 MG/DL 0.5 0.4   Albumin 3.5 - 5.0 g/dL 3.8 4.1   BUN 6 - 20 MG/DL 18 15   Creat 0.70 - 1.30 MG/DL 0.98 0.87   Na 136 - 145 mmol/L 141 143   K 3.5 - 5.1 mmol/L 3.7 4.9   Cl 97 - 108 mmol/L 107 105   CO2 21 - 32 mmol/L 28 26   Glucose 65 - 100 mg/dL 110 (H) 85       SEROLOGIES:  Serologies Latest Ref Rng & Units 9/25/2020   Hep A Ab, Total Negative   Negative   Hep B Surface Ag Index <0.10   Hep B Surface Ag Interp Negative   Negative   Hep B Core Ab, Total Negative   Positive (A)   Hep B Surface Ab mIU/mL <3.10   Hep B Surface Ab Interp NONREACTIVE   NONREACTIVE     Serologies Latest Ref Rng & Units 7/30/2020   Hep C Genotype  1a   HCV RT-PCR, Quant IU/mL 3,440,000   HCV Log10 log10 IU/mL 6.537     LIVER HISTOLOGY:  9/2020. HCV Fibrosure 0.07 which correlates with no fibrosis. ENDOSCOPIC PROCEDURES:  Not available or performed    RADIOLOGY:  9/2019. CT scan abdomen without IV contrast.  Normal appearing liver. No liver mass lesions. Normal spleen. No ascites. OTHER TESTING:  Not available or performed    FOLLOW-UP:  All of the issues listed above in the Assessment and Plan were discussed with the patient. All questions were answered. The patient expressed a clear understanding of the above. [address] [address] 4 weeks after starting hepatitis C medication. [facility] [address] [address] [facility] [address] [id number] [address], 2000 [facility] 22.  201 [facility]

## 2020-10-16 DIAGNOSIS — B18.2 CHRONIC HEPATITIS C WITHOUT HEPATIC COMA (HCC): Primary | ICD-10-CM

## 2020-10-16 RX ORDER — GLECAPREVIR AND PIBRENTASVIR 40; 100 MG/1; MG/1
3 TABLET, FILM COATED ORAL DAILY
Qty: 84 TAB | Refills: 1 | Status: SHIPPED | OUTPATIENT
Start: 2020-10-16 | End: 2021-01-25 | Stop reason: ALTCHOICE

## 2020-10-19 ENCOUNTER — HOSPITAL ENCOUNTER (EMERGENCY)
Age: 40
Discharge: HOME | End: 2020-10-20
Attending: EMERGENCY MEDICINE
Payer: MEDICAID

## 2020-10-19 DIAGNOSIS — L03.119 CELLULITIS OF UPPER EXTREMITY, UNSPECIFIED LATERALITY: ICD-10-CM

## 2020-10-19 DIAGNOSIS — L73.9 ACUTE FOLLICULITIS: ICD-10-CM

## 2020-10-19 DIAGNOSIS — L03.314 CELLULITIS OF GROIN: Primary | ICD-10-CM

## 2020-10-19 PROCEDURE — 80053 COMPREHEN METABOLIC PANEL: CPT

## 2020-10-19 PROCEDURE — 81001 URINALYSIS AUTO W/SCOPE: CPT

## 2020-10-19 PROCEDURE — 80307 DRUG TEST PRSMV CHEM ANLYZR: CPT

## 2020-10-19 PROCEDURE — 96365 THER/PROPH/DIAG IV INF INIT: CPT

## 2020-10-19 PROCEDURE — 99284 EMERGENCY DEPT VISIT MOD MDM: CPT

## 2020-10-19 PROCEDURE — 36415 COLL VENOUS BLD VENIPUNCTURE: CPT

## 2020-10-19 PROCEDURE — 85025 COMPLETE CBC W/AUTO DIFF WBC: CPT

## 2020-10-19 PROCEDURE — 87040 BLOOD CULTURE FOR BACTERIA: CPT

## 2020-10-19 RX ORDER — SODIUM CHLORIDE 0.9 % (FLUSH) 0.9 %
5-40 SYRINGE (ML) INJECTION EVERY 8 HOURS
Status: DISCONTINUED | OUTPATIENT
Start: 2020-10-19 | End: 2020-10-20 | Stop reason: HOSPADM

## 2020-10-19 RX ORDER — CLINDAMYCIN PHOSPHATE 600 MG/50ML
600 INJECTION INTRAVENOUS
Status: COMPLETED | OUTPATIENT
Start: 2020-10-19 | End: 2020-10-20

## 2020-10-19 RX ORDER — SODIUM CHLORIDE 0.9 % (FLUSH) 0.9 %
5-40 SYRINGE (ML) INJECTION AS NEEDED
Status: DISCONTINUED | OUTPATIENT
Start: 2020-10-19 | End: 2020-10-20 | Stop reason: HOSPADM

## 2020-10-20 VITALS
RESPIRATION RATE: 18 BRPM | HEIGHT: 69 IN | TEMPERATURE: 98.4 F | HEART RATE: 76 BPM | SYSTOLIC BLOOD PRESSURE: 110 MMHG | BODY MASS INDEX: 24.88 KG/M2 | DIASTOLIC BLOOD PRESSURE: 83 MMHG | WEIGHT: 168 LBS | OXYGEN SATURATION: 94 %

## 2020-10-20 LAB
ALBUMIN SERPL-MCNC: 2.7 G/DL (ref 3.5–5)
ALBUMIN/GLOB SERPL: 0.9 {RATIO} (ref 1.1–2.2)
ALP SERPL-CCNC: 69 U/L (ref 45–117)
ALT SERPL-CCNC: 122 U/L (ref 12–78)
AMPHET UR QL SCN: POSITIVE
ANION GAP SERPL CALC-SCNC: 3 MMOL/L (ref 5–15)
APPEARANCE UR: CLEAR
AST SERPL-CCNC: 148 U/L (ref 15–37)
BACTERIA URNS QL MICRO: NEGATIVE /HPF
BARBITURATES UR QL SCN: NEGATIVE
BASOPHILS # BLD: 0 K/UL (ref 0–0.1)
BASOPHILS NFR BLD: 0 % (ref 0–1)
BENZODIAZ UR QL: NEGATIVE
BILIRUB SERPL-MCNC: 0.3 MG/DL (ref 0.2–1)
BILIRUB UR QL: NEGATIVE
BUN SERPL-MCNC: 15 MG/DL (ref 6–20)
BUN/CREAT SERPL: 21 (ref 12–20)
CALCIUM SERPL-MCNC: 8.1 MG/DL (ref 8.5–10.1)
CANNABINOIDS UR QL SCN: POSITIVE
CHLORIDE SERPL-SCNC: 105 MMOL/L (ref 97–108)
CO2 SERPL-SCNC: 34 MMOL/L (ref 21–32)
COCAINE UR QL SCN: POSITIVE
COLOR UR: ABNORMAL
CREAT SERPL-MCNC: 0.7 MG/DL (ref 0.7–1.3)
DIFFERENTIAL METHOD BLD: ABNORMAL
DRUG SCRN COMMENT,DRGCM: ABNORMAL
EOSINOPHIL # BLD: 0.1 K/UL (ref 0–0.4)
EOSINOPHIL NFR BLD: 1 % (ref 0–7)
EPITH CASTS URNS QL MICRO: ABNORMAL /LPF
ERYTHROCYTE [DISTWIDTH] IN BLOOD BY AUTOMATED COUNT: 12.8 % (ref 11.5–14.5)
GLOBULIN SER CALC-MCNC: 3.1 G/DL (ref 2–4)
GLUCOSE SERPL-MCNC: 107 MG/DL (ref 65–100)
GLUCOSE UR STRIP.AUTO-MCNC: NEGATIVE MG/DL
HCT VFR BLD AUTO: 34.1 % (ref 36.6–50.3)
HGB BLD-MCNC: 11.3 G/DL (ref 12.1–17)
HGB UR QL STRIP: NEGATIVE
HYALINE CASTS URNS QL MICRO: ABNORMAL /LPF (ref 0–5)
IMM GRANULOCYTES # BLD AUTO: 0 K/UL (ref 0–0.04)
IMM GRANULOCYTES NFR BLD AUTO: 0 % (ref 0–0.5)
KETONES UR QL STRIP.AUTO: NEGATIVE MG/DL
LEUKOCYTE ESTERASE UR QL STRIP.AUTO: NEGATIVE
LYMPHOCYTES # BLD: 1.8 K/UL (ref 0.8–3.5)
LYMPHOCYTES NFR BLD: 23 % (ref 12–49)
MCH RBC QN AUTO: 28.8 PG (ref 26–34)
MCHC RBC AUTO-ENTMCNC: 33.1 G/DL (ref 30–36.5)
MCV RBC AUTO: 86.8 FL (ref 80–99)
METHADONE UR QL: POSITIVE
MONOCYTES # BLD: 0.7 K/UL (ref 0–1)
MONOCYTES NFR BLD: 8 % (ref 5–13)
NEUTS SEG # BLD: 5.2 K/UL (ref 1.8–8)
NEUTS SEG NFR BLD: 68 % (ref 32–75)
NITRITE UR QL STRIP.AUTO: NEGATIVE
NRBC # BLD: 0 K/UL (ref 0–0.01)
NRBC BLD-RTO: 0 PER 100 WBC
OPIATES UR QL: POSITIVE
PCP UR QL: NEGATIVE
PH UR STRIP: 6.5 [PH] (ref 5–8)
PLATELET # BLD AUTO: 155 K/UL (ref 150–400)
PMV BLD AUTO: 10.5 FL (ref 8.9–12.9)
POTASSIUM SERPL-SCNC: 3.5 MMOL/L (ref 3.5–5.1)
PROT SERPL-MCNC: 5.8 G/DL (ref 6.4–8.2)
PROT UR STRIP-MCNC: NEGATIVE MG/DL
RBC # BLD AUTO: 3.93 M/UL (ref 4.1–5.7)
RBC #/AREA URNS HPF: ABNORMAL /HPF (ref 0–5)
SODIUM SERPL-SCNC: 142 MMOL/L (ref 136–145)
SP GR UR REFRACTOMETRY: 1.02 (ref 1–1.03)
UA: UC IF INDICATED,UAUC: ABNORMAL
UROBILINOGEN UR QL STRIP.AUTO: 4 EU/DL (ref 0.2–1)
WBC # BLD AUTO: 7.7 K/UL (ref 4.1–11.1)
WBC URNS QL MICRO: ABNORMAL /HPF (ref 0–4)

## 2020-10-20 PROCEDURE — 74011250636 HC RX REV CODE- 250/636: Performed by: EMERGENCY MEDICINE

## 2020-10-20 RX ORDER — MUPIROCIN CALCIUM 20 MG/G
CREAM TOPICAL 2 TIMES DAILY
Qty: 15 G | Refills: 0 | Status: SHIPPED | OUTPATIENT
Start: 2020-10-20 | End: 2020-10-20

## 2020-10-20 RX ORDER — MUPIROCIN CALCIUM 20 MG/G
CREAM TOPICAL 2 TIMES DAILY
Qty: 15 G | Refills: 0 | Status: SHIPPED | OUTPATIENT
Start: 2020-10-20 | End: 2021-01-25 | Stop reason: ALTCHOICE

## 2020-10-20 RX ORDER — CLINDAMYCIN HYDROCHLORIDE 150 MG/1
300 CAPSULE ORAL 3 TIMES DAILY
Qty: 42 CAP | Refills: 0 | Status: SHIPPED | OUTPATIENT
Start: 2020-10-20 | End: 2020-10-20

## 2020-10-20 RX ORDER — CLINDAMYCIN HYDROCHLORIDE 150 MG/1
300 CAPSULE ORAL 3 TIMES DAILY
Qty: 42 CAP | Refills: 0 | Status: SHIPPED | OUTPATIENT
Start: 2020-10-20 | End: 2020-10-27

## 2020-10-20 RX ADMIN — Medication 10 ML: at 00:47

## 2020-10-20 RX ADMIN — CLINDAMYCIN IN 5 PERCENT DEXTROSE 600 MG: 12 INJECTION, SOLUTION INTRAVENOUS at 00:42

## 2020-10-20 NOTE — ED NOTES
Pt discharged home with written and verbal instructions given and ambulated out of ED without difficulty.

## 2020-10-20 NOTE — DISCHARGE INSTRUCTIONS
Patient Education        Cellulitis: Care Instructions  Your Care Instructions     Cellulitis is a skin infection caused by bacteria, most often strep or staph. It often occurs after a break in the skin from a scrape, cut, bite, or puncture, or after a rash. Cellulitis may be treated without doing tests to find out what caused it. But your doctor may do tests, if needed, to look for a specific bacteria, like methicillin-resistant Staphylococcus aureus (MRSA). The doctor has checked you carefully, but problems can develop later. If you notice any problems or new symptoms, get medical treatment right away. Follow-up care is a key part of your treatment and safety. Be sure to make and go to all appointments, and call your doctor if you are having problems. It's also a good idea to know your test results and keep a list of the medicines you take. How can you care for yourself at home? · Take your antibiotics as directed. Do not stop taking them just because you feel better. You need to take the full course of antibiotics. · Prop up the infected area on pillows to reduce pain and swelling. Try to keep the area above the level of your heart as often as you can. · If your doctor told you how to care for your wound, follow your doctor's instructions. If you did not get instructions, follow this general advice:  ? Wash the wound with clean water 2 times a day. Don't use hydrogen peroxide or alcohol, which can slow healing. ? You may cover the wound with a thin layer of petroleum jelly, such as Vaseline, and a nonstick bandage. ? Apply more petroleum jelly and replace the bandage as needed. · Be safe with medicines. Take pain medicines exactly as directed. ? If the doctor gave you a prescription medicine for pain, take it as prescribed. ? If you are not taking a prescription pain medicine, ask your doctor if you can take an over-the-counter medicine.   To prevent cellulitis in the future  · Try to prevent cuts, scrapes, or other injuries to your skin. Cellulitis most often occurs where there is a break in the skin. · If you get a scrape, cut, mild burn, or bite, wash the wound with clean water as soon as you can to help avoid infection. Don't use hydrogen peroxide or alcohol, which can slow healing. · If you have swelling in your legs (edema), support stockings and good skin care may help prevent leg sores and cellulitis. · Take care of your feet, especially if you have diabetes or other conditions that increase the risk of infection. Wear shoes and socks. Do not go barefoot. If you have athlete's foot or other skin problems on your feet, talk to your doctor about how to treat them. When should you call for help? Call your doctor now or seek immediate medical care if:    · You have signs that your infection is getting worse, such as:  ? Increased pain, swelling, warmth, or redness. ? Red streaks leading from the area. ? Pus draining from the area. ? A fever.     · You get a rash. Watch closely for changes in your health, and be sure to contact your doctor if:    · You do not get better as expected. Where can you learn more? Go to http://www.gray.com/  Enter X309 in the search box to learn more about \"Cellulitis: Care Instructions. \"  Current as of: July 2, 2020               Content Version: 12.6  © 8308-2701 [employer]. Care instructions adapted under license by [employer] (which disclaims liability or warranty for this information). If you have questions about a medical condition or this instruction, always ask your healthcare professional. [address] [address] any warranty or liability for your use of this information. Patient Education        Folliculitis: Care Instructions  Overview     Folliculitis (say \"ghx-JMB-wrz-LY-tus\") is an inflammation of the pouches (follicles) in the skin where hair grows.  It can occur on any part of the body, but it is most common on the scalp, face, armpits, and groin. Bacteria, such as those found in a hot tub, can cause folliculitis. But folliculitis can also be caused by other organisms, such as fungi or parasites. Folliculitis begins as a red, tender area near a strand of hair. The skin can itch or burn and may drain pus or blood. Sometimes folliculitis can lead to more serious skin infections. Your doctor usually can treat mild folliculitis with an antibiotic cream or ointment. If you have folliculitis on your scalp, you may use a medicated shampoo. Antibiotics you take as pills can treat infections deeper in the skin. Other treatments that may be used include antifungal and antiparasitic medicines. Folliculitis may be caused by ingrown hairs from shaving. One solution is to stop shaving. If that isn't an option, using an electric razor that doesn't shave so close may help. Laser treatment may also be an option. Laser treatment destroys the hair follicle so hair will no longer grow in the treated area. Follow-up care is a key part of your treatment and safety. Be sure to make and go to all appointments, and call your doctor if you are having problems. It's also a good idea to know your test results and keep a list of the medicines you take. How can you care for yourself at home? · Take your medicine exactly as prescribed. If your doctor prescribed antibiotics, take them as directed. Do not stop taking them just because you feel better. You need to take the full course of antibiotics. · To help with itching or pain, put a warm, moist cloth (like a clean washcloth) on the area for 5 to 10 minutes, 3 to 6 times a day. · Do not share your razor, towel, or washcloth. That can spread folliculitis. · If folliculitis is caused by shaving, try to avoid shaving for at least a month. If that isn't an option, use an electric razor that doesn't shave so close.  Or if you need a clean shave, use shaving cream or gel and always shave in the direction that the hair grows. When should you call for help? Call your doctor now or seek immediate medical care if:    · You have symptoms of infection, such as:  ? Increased pain, swelling, warmth, or redness. ? Red streaks leading from the area. ? Pus draining from the area. ? A fever. Watch closely for changes in your health, and be sure to contact your doctor if:    · You do not get better as expected. Where can you learn more? Go to http://www.martinez.com/  Enter M257 in the search box to learn more about \"Folliculitis: Care Instructions. \"  Current as of: July 2, 2020               Content Version: 12.6  © 2006-2020 [employer]. Care instructions adapted under license by [employer] (which disclaims liability or warranty for this information). If you have questions about a medical condition or this instruction, always ask your healthcare professional. [address] any warranty or liability for your use of this information.

## 2020-10-20 NOTE — ED NOTES
Pt states rash started 2 days ago on right arm. States he has worked in the yard around [employer], went to the dump w/ [employer]. Pt states it has spread everywhere and is itching bad. Thinks it has gotten infected because one of the bumps had \"a lot of nasty stuff come out of it\". Pt noted to have swollen left eye lid.

## 2020-10-20 NOTE — ED PROVIDER NOTES
EMERGENCY DEPARTMENT HISTORY AND PHYSICAL EXAM      Date: 10/19/2020  Patient Name: Daniel Chambers III    History of Presenting Illness     Chief Complaint   Patient presents with    Skin Problem     Pt reports rash starting on his arms that has now spread all over his body, patient also has notable L eye lid swelling. Patient reprots rash is itchy and painful. Also reprots lesions that have opened. History Provided By: Patient    HPI: Darlena Bernheim, 38 y.o. male with PMHx significant for polysubstance abuse, hepatitis C, hypertension presents to the ED with a rash on his arms, lower abdomen, face, and groin that started about 3 days ago. Patient reports that it is extremely itchy and he has been scratching it\" digging in it\". Since the onset it seems to have spread and now patient states there is some areas where he is seen pus draining. Patient also has a history in the chart of MRSA. The day prior to the onset of the rash, patient reports he took a lot of trash to the dump. He states that the trash included rotten food and he did not use gloves. He also worked in the yard around [employer]. His back and lower legs are spared any fevers, chills, nausea, vomiting. He states that his left upper eyelid has become inflamed as well PCP: Jo Davis MD    No current facility-administered medications on file prior to encounter. Current Outpatient Medications on File Prior to Encounter   Medication Sig Dispense Refill    glecaprevir-pibrentasvir (Mavyret) 100-40 mg tab Take 3 Tabs by mouth daily. 84 Tab 1    methadone HCl (METHADONE PO) Take 80 mg by mouth daily.  promethazine (PHENERGAN) 25 mg tablet Take 1 Tab by mouth every six (6) hours as needed for Nausea. 30 Tab 0    dextroamphetamine-amphetamine (ADDERALL) 20 mg tablet Take 1 Tab by mouth two (2) times a day.  Max Daily Amount: 40 mg. 60 Tab 0    DULoxetine (CYMBALTA) 30 mg capsule TAKE 1 CAPSULE BY MOUTH EVERY DAY 30 Cap 12  metoprolol tartrate (LOPRESSOR) 50 mg tablet TAKE 1 TAB BY MOUTH TWO (2) TIMES A DAY. FOR BLOOD PRESSURE 180 Tab 3    divalproex DR (DEPAKOTE) 500 mg tablet Take 1 Tab by mouth two (2) times a day. (Patient taking differently: Take 500 mg by mouth two (2) times a day. Indications: Patient only taking once a day) 90 Tab 3       Past History     Past Medical History:  Past Medical History:   Diagnosis Date    Arthritis     Hypertension     Ill-defined condition     Hep C    Infectious disease     Hep C    Other ill-defined conditions(799.89)     hepatitis C, kidney stones    Other ill-defined conditions(799.89)     DDD    Psychiatric disorder     prescription drug abuse       Past Surgical History:  Past Surgical History:   Procedure Laterality Date    COLONOSCOPY N/A 6/29/2016    COLONOSCOPY performed [facility] Eligio Hamilton MD at [facility] ENDOSCOPY    HX LITHOTRIPSY      HX UROLOGICAL      lithotripsy       Family History:  Family History   Problem Relation Age of Onset    Heart Disease Mother     Heart Disease Father     Kidney Disease Sister        Social History:  Social History     Tobacco Use    Smoking status: Current Every Day Smoker     Packs/day: 0.50     Years: 20.00     Pack years: 10.00     Types: Cigarettes    Smokeless tobacco: Never Used    Tobacco comment: smokes 4 cigars daily   Substance Use Topics    Alcohol use: No     Comment: quit approximately 1yr ago    Drug use: Yes     Types: Opiates, Heroin, Prescription, Marijuana, Cocaine     Comment: pt has hx.  pt still does marijuana       Allergies: Allergies   Allergen Reactions    Ibuprofen Nausea and Vomiting     GI upset; bleeding of the stomach per patient     Nsaids (Non-Steroidal Anti-Inflammatory Drug) Anaphylaxis    Tramadol Seizures    Tylenol [Acetaminophen] Unproven on [employer]     Patient states that he cannot take tylenol because \"its bad for my liver\".  Pt reports Hep C and that MD had told him to not take tylenol in the past.          Review of Systems   Review of Systems   Constitutional: Negative for chills and fever. HENT: Negative for congestion, ear pain, rhinorrhea and sore throat. Respiratory: Negative for cough, chest tightness, shortness of breath and wheezing. Cardiovascular: Negative for chest pain, palpitations and leg swelling. Gastrointestinal: Negative for abdominal pain, diarrhea, nausea and vomiting. Genitourinary: Negative for dysuria, flank pain and hematuria. Musculoskeletal: Negative for back pain and myalgias. Skin: Positive for rash and wound. Neurological: Negative for dizziness, syncope, light-headedness, numbness and headaches. Psychiatric/Behavioral: Negative for confusion. The patient is nervous/anxious. Physical Exam   General appearance - well nourished, well appearing, and in no distress  Eyes - pupils equal and reactive, extraocular eye movements intact, left upper lid with erythema and edema  ENT - mucous membranes moist, pharynx normal without lesions  Neck - supple, no significant adenopathy; non-tender to palpation  Chest - clear to auscultation, no wheezes, rales or rhonchi; non-tender to palpation  Heart - normal rate and regular rhythm, S1 and S2 normal, no murmurs noted  Abdomen - soft, nontender, nondistended, no masses or organomegaly  Musculoskeletal - no joint tenderness, deformity or swelling; normal ROM  Extremities - peripheral pulses normal, no pedal edema  Skin -rash involving lower abdomen, arms, face, and groin is erythematous with innumerable pustules.   All areas are extremely excoriated and patient continues to scratch during the exam.  Neurological - alert, oriented x3, normal speech, no focal findings or movement disorder noted    Diagnostic Study Results     Labs -     Recent Results (from the past 150 hour(s))   CBC WITH AUTOMATED DIFF    Collection Time: 10/19/20 11:58 PM   Result Value Ref Range    WBC 7.7 4.1 - 11.1 K/uL    RBC 3.93 (L) 4.10 - 5.70 M/uL    HGB 11.3 (L) 12.1 - 17.0 g/dL    HCT 34.1 (L) 36.6 - 50.3 %    MCV 86.8 80.0 - 99.0 FL    MCH 28.8 26.0 - 34.0 PG    MCHC 33.1 30.0 - 36.5 g/dL    RDW 12.8 11.5 - 14.5 %    PLATELET 203 692 - 889 K/uL    MPV 10.5 8.9 - 12.9 FL    NRBC 0.0 0  WBC    ABSOLUTE NRBC 0.00 0.00 - 0.01 K/uL    NEUTROPHILS 68 32 - 75 %    LYMPHOCYTES 23 12 - 49 %    MONOCYTES 8 5 - 13 %    EOSINOPHILS 1 0 - 7 %    BASOPHILS 0 0 - 1 %    IMMATURE GRANULOCYTES 0 0.0 - 0.5 %    ABS. NEUTROPHILS 5.2 1.8 - 8.0 K/UL    ABS. LYMPHOCYTES 1.8 0.8 - 3.5 K/UL    ABS. MONOCYTES 0.7 0.0 - 1.0 K/UL    ABS. EOSINOPHILS 0.1 0.0 - 0.4 K/UL    ABS. BASOPHILS 0.0 0.0 - 0.1 K/UL    ABS. IMM. GRANS. 0.0 0.00 - 0.04 K/UL    DF AUTOMATED     METABOLIC PANEL, COMPREHENSIVE    Collection Time: 10/19/20 11:58 PM   Result Value Ref Range    Sodium 142 136 - 145 mmol/L    Potassium 3.5 3.5 - 5.1 mmol/L    Chloride 105 97 - 108 mmol/L    CO2 34 (H) 21 - 32 mmol/L    Anion gap 3 (L) 5 - 15 mmol/L    Glucose 107 (H) 65 - 100 mg/dL    BUN 15 6 - 20 MG/DL    Creatinine 0.70 0.70 - 1.30 MG/DL    BUN/Creatinine ratio 21 (H) 12 - 20      GFR est AA >60 >60 ml/min/1.73m2    GFR est non-AA >60 >60 ml/min/1.73m2    Calcium 8.1 (L) 8.5 - 10.1 MG/DL    Bilirubin, total 0.3 0.2 - 1.0 MG/DL    ALT (SGPT) 122 (H) 12 - 78 U/L    AST (SGOT) 148 (H) 15 - 37 U/L    Alk.  phosphatase 69 45 - 117 U/L    Protein, total 5.8 (L) 6.4 - 8.2 g/dL    Albumin 2.7 (L) 3.5 - 5.0 g/dL    Globulin 3.1 2.0 - 4.0 g/dL    A-G Ratio 0.9 (L) 1.1 - 2.2     CULTURE, BLOOD, PAIRED    Collection Time: 10/19/20 11:58 PM    Specimen: Blood   Result Value Ref Range    Special Requests: NO SPECIAL REQUESTS      Culture result: NO GROWTH 4 DAYS     URINALYSIS W/ REFLEX CULTURE    Collection Time: 10/19/20 11:58 PM    Specimen: Urine   Result Value Ref Range    Color YELLOW/STRAW      Appearance CLEAR CLEAR      Specific gravity 1.022 1.003 - 1.030      pH (UA) 6.5 5.0 - 8.0      Protein Negative NEG mg/dL    Glucose Negative NEG mg/dL    Ketone Negative NEG mg/dL    Bilirubin Negative NEG      Blood Negative NEG      Urobilinogen 4.0 (H) 0.2 - 1.0 EU/dL    Nitrites Negative NEG      Leukocyte Esterase Negative NEG      WBC 0-4 0 - 4 /hpf    RBC 0-5 0 - 5 /hpf    Epithelial cells FEW FEW /lpf    Bacteria Negative NEG /hpf    UA:UC IF INDICATED CULTURE NOT INDICATED BY UA RESULT CNI      Hyaline cast 0-2 0 - 5 /lpf   DRUG SCREEN, URINE    Collection Time: 10/19/20 11:58 PM   Result Value Ref Range    AMPHETAMINES Positive (A) NEG      BARBITURATES Negative NEG      BENZODIAZEPINES Negative NEG      COCAINE Positive (A) NEG      METHADONE Positive (A) NEG      OPIATES Positive (A) NEG      PCP(PHENCYCLIDINE) Negative NEG      THC (TH-CANNABINOL) Positive (A) NEG      Drug screen comment (NOTE)        Radiologic Studies -   No orders to display     CT Results  (Last 48 hours)    None        CXR Results  (Last 48 hours)    None            Medical Decision Making   I am the first provider for this patient. I reviewed the vital signs, available nursing notes, past medical history, past surgical history, family history and social history. Vital Signs-Reviewed the patient's vital signs. Patient Vitals for the past 12 hrs:   Temp Pulse Resp BP SpO2   10/19/20 2040 99.5 °F (37.5 °C) 89 18 122/64 99 %       Records Reviewed: Nursing Notes and Old Medical Records    Provider Notes (Medical Decision Making):   Differential diagnosis: Allergic reaction, contact dermatitis, scabies, MRSA, cellulitis  We will check CBC, CMP, paired cultures. Will treat with clindamycin given patient's history of MRSA    ED Course:   Initial assessment performed. The patients presenting problems have been discussed, and they are in agreement with the care plan formulated and outlined with them. I have encouraged them to ask questions as they arise throughout their visit.     Progress Notes:  ED Course as of Oct 24 0939   [place] Oct 20, 2020   0102 Patient denies any illegal drug use for the past 3 years and has been on methadone. Although he does have some lesions on both his arms, he also has numerous pustules on his  groin and lower abdomen, and they do not appear to be related to \"skin popping\". His drug screen is positive for amphetamines, cocaine, methadone, opiates, and marijuana. Patient is afebrile and white count is 7.7 with no left shift. Will treat with clindamycin and encourage close follow-up with primary care physician. Patient may go to his methadone clinic in the morning as scheduled. Return to ED for worsening symptoms or if patient develops a fever or swelling or pain in his scrotum or perineum.   [AO]      ED Course User Index  [AO] Rosanne Youssef MD       Disposition:  Discharge home    PLAN:  1. Discharge Medication List as of 10/20/2020  1:08 AM      START taking these medications    Details   clindamycin (CLEOCIN) 150 mg capsule Take 2 Caps by mouth three (3) times daily for 7 days. , Normal, Disp-42 Cap,R-0      mupirocin calcium (Bactroban) 2 % topical cream Apply  to affected area two (2) times a day., Normal, Disp-15 g,R-0         CONTINUE these medications which have NOT CHANGED    Details   glecaprevir-pibrentasvir (Mavyret) 100-40 mg tab Take 3 Tabs by mouth daily. , Normal, Disp-84 Tab,R-1      methadone HCl (METHADONE PO) Take 80 mg by mouth daily. , Historical Med      promethazine (PHENERGAN) 25 mg tablet Take 1 Tab by mouth every six (6) hours as needed for Nausea., Normal, Disp-30 Tab,R-0      dextroamphetamine-amphetamine (ADDERALL) 20 mg tablet Take 1 Tab by mouth two (2) times a day. Max Daily Amount: 40 mg., Normal, Disp-60 Tab,R-0      DULoxetine (CYMBALTA) 30 mg capsule TAKE 1 CAPSULE BY MOUTH EVERY DAY, Normal, Disp-30 Cap,R-12      metoprolol tartrate (LOPRESSOR) 50 mg tablet TAKE 1 TAB BY MOUTH TWO (2) TIMES A DAY.  FOR BLOOD PRESSURE, Normal, Disp-180 Tab, R-3      divalproex  (DEPAKOTE) 500 mg tablet Take 1 Tab by mouth two (2) times a day., No Print, Disp-90 Tab, R-3           2. Follow-up Information     Follow up With Specialties Details Why Contact Info    [facility] EMERGENCY DEPT Emergency Medicine  If symptoms worsen [address] [place] [address]  [phone]    Anny Tamez MD Family Medicine Schedule an appointment as soon as possible for a visit  [address] [place] [address] [id number] [phone]          Return to ED if worse     Diagnosis     Clinical Impression:   1. Cellulitis of groin    2. Cellulitis of upper extremity, unspecified laterality    3.  Acute folliculitis

## 2020-10-21 RX ORDER — DIVALPROEX SODIUM 500 MG/1
500 TABLET, DELAYED RELEASE ORAL 2 TIMES DAILY
Qty: 60 TAB | Refills: 5 | Status: SHIPPED | OUTPATIENT
Start: 2020-10-21 | End: 2021-05-20

## 2020-10-25 LAB
BACTERIA SPEC CULT: NORMAL
SERVICE CMNT-IMP: NORMAL

## 2020-11-20 ENCOUNTER — OFFICE VISIT (OUTPATIENT)
Dept: FAMILY MEDICINE CLINIC | Age: 40
End: 2020-11-20
Payer: COMMERCIAL

## 2020-11-20 VITALS
BODY MASS INDEX: 23.58 KG/M2 | WEIGHT: 159.2 LBS | HEART RATE: 54 BPM | DIASTOLIC BLOOD PRESSURE: 69 MMHG | OXYGEN SATURATION: 97 % | SYSTOLIC BLOOD PRESSURE: 107 MMHG | RESPIRATION RATE: 16 BRPM | HEIGHT: 69 IN | TEMPERATURE: 96.6 F

## 2020-11-20 DIAGNOSIS — F90.2 ATTENTION DEFICIT HYPERACTIVITY DISORDER (ADHD), COMBINED TYPE: ICD-10-CM

## 2020-11-20 PROCEDURE — 99213 OFFICE O/P EST LOW 20 MIN: CPT | Performed by: FAMILY MEDICINE

## 2020-11-20 RX ORDER — DEXTROAMPHETAMINE SACCHARATE, AMPHETAMINE ASPARTATE, DEXTROAMPHETAMINE SULFATE AND AMPHETAMINE SULFATE 5; 5; 5; 5 MG/1; MG/1; MG/1; MG/1
20 TABLET ORAL 2 TIMES DAILY
Qty: 60 TAB | Refills: 0 | Status: SHIPPED | OUTPATIENT
Start: 2020-11-20 | End: 2021-01-05 | Stop reason: SDUPTHER

## 2020-11-20 NOTE — PROGRESS NOTES
Chief Complaint   Patient presents with    Follow-up    Medication Refill     1. Have you been to the ER, urgent care clinic since your last visit? Hospitalized since your last visit? Yes [facility]-10/19/20    2. Have you seen or consulted any other health care providers outside of the [address] [place] [address] since your last visit? Include any pap smears or colon screening. Yes Reason for visit: Liver doctor    Patient states adderall twice daily and wants discuss decreasing dosage.

## 2020-11-20 NOTE — PROGRESS NOTES
HISTORY OF PRESENT ILLNESS  Kasey Sam III is a 38 y.o. male. HPI in fo followup of a skin infection. Was seen in ER one month ago for an infected skin rash, put on clindamycin and mupirocin, doing much better. Currently taking medicine for Hepatitis C, on 3rd week now. Still looking for a new job. ROS    Physical Exam  Vitals signs and nursing note reviewed. Constitutional:       Appearance: He is well-developed. HENT:      Right Ear: External ear normal.      Left Ear: External ear normal.   Neck:      Thyroid: No thyromegaly. Cardiovascular:      Rate and Rhythm: Normal rate and regular rhythm. Heart sounds: Normal heart sounds. Pulmonary:      Effort: Pulmonary effort is normal. No respiratory distress. Breath sounds: Normal breath sounds. No wheezing. Abdominal:      General: Bowel sounds are normal. There is no distension. Palpations: Abdomen is soft. There is no mass. Tenderness: There is no abdominal tenderness. There is no guarding. Musculoskeletal: Normal range of motion. Lymphadenopathy:      Cervical: No cervical adenopathy. Skin:     Comments: No signs infection. Several excoriations. ASSESSMENT and PLAN  Orders Placed This Encounter    dextroamphetamine-amphetamine (ADDERALL) 20 mg tablet     Diagnoses and all orders for this visit:    1. Attention deficit hyperactivity disorder (ADHD), combined type  -     dextroamphetamine-amphetamine (ADDERALL) 20 mg tablet; Take 1 Tab by mouth two (2) times a day. Max Daily Amount: 40 mg.

## 2020-12-01 ENCOUNTER — OFFICE VISIT (OUTPATIENT)
Dept: HEMATOLOGY | Age: 40
End: 2020-12-01
Payer: COMMERCIAL

## 2020-12-01 VITALS
WEIGHT: 160 LBS | RESPIRATION RATE: 18 BRPM | BODY MASS INDEX: 23.7 KG/M2 | HEART RATE: 59 BPM | SYSTOLIC BLOOD PRESSURE: 140 MMHG | DIASTOLIC BLOOD PRESSURE: 84 MMHG | OXYGEN SATURATION: 98 % | TEMPERATURE: 98.8 F | HEIGHT: 69 IN

## 2020-12-01 DIAGNOSIS — B18.2 CHRONIC HEPATITIS C WITHOUT HEPATIC COMA (HCC): Primary | ICD-10-CM

## 2020-12-01 PROCEDURE — 99213 OFFICE O/P EST LOW 20 MIN: CPT | Performed by: NURSE PRACTITIONER

## 2020-12-01 NOTE — PROGRESS NOTES
Identified pt with two pt identifiers(name and ). Reviewed record in preparation for visit and have obtained necessary documentation. No chief complaint on file. Vitals:    20 1040   Temp: 98.8 °F (37.1 °C)   TempSrc: Temporal   Weight: 160 lb (72.6 kg)   Height: 5' 9\" (1.753 m)   PainSc:   0 - No pain       Health Maintenance Review: Patient reminded of \"due or due soon\" health maintenance. I have asked the patient to contact his/her primary care provider (PCP) for follow-up on his/her health maintenance. Coordination of Care Questionnaire:  :   1) Have you been to an emergency room, urgent care, or hospitalized since your last visit? If yes, where when, and reason for visit? no       2. Have seen or consulted any other health care provider since your last visit? If yes, where when, and reason for visit? NO      Patient is accompanied by self I have received verbal consent from Cathy Swan III to discuss any/all medical information while they are present in the room.

## 2020-12-01 NOTE — PROGRESS NOTES
[address] [place] [address], MD, MD Reno Butcher, PAREGINE Garcia, ACN-BC     Rosanne Mckeon, [facility]   Traan Palmer, FNP-DIANNE Frederick, [facility]       [address]    at [address], [facility]  22.    [phone]    FAX: [address] [place] [address], [address]    [phone]    FAX: [phone]       Patient Care Team:  Linda Hu MD as PCP - Miguel Reyes MD as PCP - [facility]  Renate Snow RN as Nurse Navigator  Sal Pisano RN as Nurse Navigator      Problem List  Date Reviewed: 10/12/2020          Codes Class Noted    ADD (attention deficit disorder) ICD-10-CM: F98.8  ICD-9-CM: 314.00  9/25/2020        Peptic ulcer disease ICD-10-CM: K27.9  ICD-9-CM: 533.90  6/26/2016        Chronic viral hepatitis C ([facility].) ICD-10-CM: B18.2  ICD-9-CM: 070.54  8/1/2013        Shoulder pain ICD-10-CM: M25.519  ICD-9-CM: 719.41  8/1/2013        Back pain, chronic ICD-10-CM: M54.9, G89.29  ICD-9-CM: 724.5, 338.29  8/1/2013        Knee pain, bilateral ICD-10-CM: M25.561, M25.562  ICD-9-CM: 719.46  8/1/2013        H/O intravenous drug use in remission ICD-10-CM: Z87.898  ICD-9-CM: 305.93  8/8/2012        Antisocial personality disorder ([facility] (Chronic) ICD-10-CM: F60.2  ICD-9-CM: 301.7  8/8/2012        Depression ICD-10-CM: F32.9  ICD-9-CM: 015  4/20/2011              Valjean Corners III is being seen at [facility] for management of chronic HCV. The active problem list, all pertinent past medical history, medications, radiology and laboratory findings related to the liver disorder were reviewed with the patient.       The patient is a 38 y.o.  male who was found to have abnormalities in liver chemistries and subsequently tested positive for chronic HCV in 2000. Risk factors for acquiring HCV are IV drug use in 1990s - 2018. There was no history of acute icteric hepatitis at the time of these risk factors. CT scan of the liver was performed in 9/2019. The results of the imaging demonstrated a normal appearing liver. An assessment of liver fibrosis was performed with HCV Fibrosure. The score was 0.07 which correlates with no fibrosis. The patient started an intended 8 weeks of hepatitis C treatment with Mavyret 11/03/2020. He is experiencing fatigue along with abdominal distention, bloating, and mucous in stool. Repeat testing will be ordered today to determine response to treatment. The patient has the following symptoms which are thought to be due to the liver disease:  fatigue, pain in the right side over the liver, and multiple joint pain. The patient is not currently experiencing the following symptoms of liver disease:  problems concentrating, swelling of the abdomen, swelling of the lower xtremities, hematemesis, hematochezia. The patient has limitations in functional activities which can be attributed to the liver disease and to other medical problems that are not related to the liver disease. ASSESSMENT AND PLAN:  Chronic HCV   Chronic HCV with no fibrosis. Severity of the liver disease was assessed by laboratory studies, Imaging, and HCV Fibrosure 9/2020. Liver transaminases are elevated. ALP is normal.  Liver function is normal.  The platelet count is depressed. Based upon laboratory studies, HCV Fibrosure, and imaging  the patient does not appear to have significant liver injury. Have performed laboratory testing to monitor liver function and degree of liver injury. This included BMP, hepatic panel, and CBC with platelet count.      Chronic HCV Treatment  The patient has HCV genotype 1A. The patient the patient started an intended 8 weeks of hepatitis C treatment with Daphnemariam Valienteo (glecaprevir and piprentasvir)11/03/2020. A repeat HCV RNA will be ordered today to evaluate response to treatment. Abdominal Discomfort  It is unlikely this is related to the medication. The patient reports a past episode of an \"infection\" in his colon. The pretreatment HGB was slightly low. Will repeat today and refer to GI if this remains low. Cryoglobulinemia  RF and cryoglobulins were negative. Screening for Hepatocellular Carcinoma  HCC screening is not necessary if the patient has no evidence of cirrhosis. Treatment of other medical problems in patients with chronic liver disease  There are no contraindications for the patient to take most medications that are necessary for treatment of other medical issues. Counseling for alcohol in patients with chronic liver disease  The patient was counseled regarding alcohol consumption and the effect of alcohol on chronic liver disease. The patient does not consume any significant amount of alcohol. Substance Use  The patient was counseled regarding the risk of overdose and death from using opioids. Discussed the risk of becoming reinfected with HCV once they are cured if they resume IV drug use or inhaling drugs nasally. The patient has not used drugs since 2018. Vaccinations   Vaccination for viral hepatitis A is recommended since the patient has no serologic evidence of previous exposure or vaccination with immunity. Vaccination for viral hepatitis B is not needed. The patient has serologic evidence of prior exposure or vaccination with immunity. Routine vaccinations against other bacterial and viral agents can be performed as indicated. Annual flu vaccination should be administered if indicated.       ALLERGIES  Allergies   Allergen Reactions    Ibuprofen Nausea and Vomiting     GI upset; bleeding of the stomach per patient     Nsaids (Non-Steroidal Anti-Inflammatory Drug) Anaphylaxis    Tramadol Seizures    Tylenol [Acetaminophen] Unproven on Challenge     Patient states that he cannot take tylenol because \"its bad for my liver\". Pt reports Hep C and that MD had told him to not take tylenol in the past.        MEDICATIONS  Current Outpatient Medications   Medication Sig    dextroamphetamine-amphetamine (ADDERALL) 20 mg tablet Take 1 Tab by mouth two (2) times a day. Max Daily Amount: 40 mg.    divalproex DR (DEPAKOTE) 500 mg tablet Take 1 Tab by mouth two (2) times a day. Indications: Patient only taking once a day    mupirocin calcium (Bactroban) 2 % topical cream Apply  to affected area two (2) times a day.  glecaprevir-pibrentasvir (Mavyret) 100-40 mg tab Take 3 Tabs by mouth daily.  methadone HCl (METHADONE PO) Take 80 mg by mouth daily.  promethazine (PHENERGAN) 25 mg tablet Take 1 Tab by mouth every six (6) hours as needed for Nausea.  DULoxetine (CYMBALTA) 30 mg capsule TAKE 1 CAPSULE BY MOUTH EVERY DAY    metoprolol tartrate (LOPRESSOR) 50 mg tablet TAKE 1 TAB BY MOUTH TWO (2) TIMES A DAY. FOR BLOOD PRESSURE     No current facility-administered medications for this visit. SYSTEM REVIEW NOT RELATED TO LIVER DISEASE OR REVIEWED ABOVE:  Constitution systems: Negative for fever, chills, weight gain, weight loss. Eyes: Negative for visual changes. ENT: Negative for sore throat, painful swallowing. Respiratory: Negative for cough, hemoptysis, SOB. Cardiology: Negative for chest pain, palpitations. GI:  Negative for constipation or diarrhea. : Negative for urinary frequency, dysuria, hematuria, nocturia. Skin: Negative for rash. Hematology: Negative for easy bruising, blood clots. Musculo-skeletal: Negative for back pain, muscle pain, weakness. Neurologic: Negative for headaches, dizziness, vertigo, memory problems not related to HE. Psychology: Negative for anxiety, depression. FAMILY HISTORY:  The father  of CHF. The mother Has/had the following chronic disease(s): anxiety, heart disease. There is no family history of liver disease. SOCIAL HISTORY:  The patient has never been . The patient has no children. The patient currently smokes 1/2 pack of tobacco daily. The patient has never consumed significant amounts of alcohol. The patient does not work. PHYSICAL EXAMINATION:  Visit Vitals  BP (!) 140/84 (BP 1 Location: Right arm, BP Patient Position: Sitting)   Pulse (!) 59   Temp 98.8 °F (37.1 °C) (Temporal)   Resp 18   Ht 5' 9\" (1.753 m)   Wt 160 lb (72.6 kg)   SpO2 98%   BMI 23.63 kg/m²     General: No acute distress. Eyes: Sclera anicteric. ENT: No oral lesions. Thyroid normal.  Nodes: No adenopathy. Skin: No spider angiomata. No jaundice. No palmar erythema. Respiratory: Lungs clear to auscultation. Cardiovascular: Regular heart rate. No murmurs. No JVD. Abdomen: Soft non-tender. Liver size normal to percussion/palpation. Spleen not palpable. No obvious ascites. Extremities: No edema. No muscle wasting. No gross arthritic changes. Neurologic: Alert and oriented. Cranial nerves grossly intact. No asterixis.     LABORATORY STUDIES:  Liver [place] of [address] & Units 10/19/2020   WBC 4.1 - 11.1 K/uL 7.7   ANC 1.8 - 8.0 K/UL 5.2   HGB 12.1 - 17.0 g/dL 11.3 (L)    - 400 K/uL 155   AST 15 - 37 U/L 148 (H)   ALT 12 - 78 U/L 122 (H)   Alk Phos 45 - 117 U/L 69   Bili, Total 0.2 - 1.0 MG/DL 0.3   Bili, Direct 0.0 - 0.2 MG/DL    Albumin 3.5 - 5.0 g/dL 2.7 (L)   BUN 6 - 20 MG/DL 15   Creat 0.70 - 1.30 MG/DL 0.70   Na 136 - 145 mmol/L 142   K 3.5 - 5.1 mmol/L 3.5   Cl 97 - 108 mmol/L 105   CO2 21 - 32 mmol/L 34 (H)   Glucose 65 - 100 mg/dL 107 (H)     Liver [place] [facility] Latest Ref Rng & Units 2020   WBC 4.1 - 11.1 K/uL 4.8   ANC 1.8 - 8.0 K/UL 2.4   HGB 12.1 - 17.0 g/dL 13.5    - 400 K/uL 147 (L)   AST 15 - 37 U/L 40 (H)   ALT 0 - 55 IU/L 56   Alk Phos 45 - 117 U/L 72   Bili, Total 0.0 - 1.2 mg/dL 0.2   Bili, Direct 0.0 - 0.2 MG/DL 0.1   Albumin 3.5 - 5.0 g/dL 3.5   BUN 6 - 20 MG/DL 14   Creat 0.70 - 1.30 MG/DL 0.70   Na 136 - 145 mmol/L 142   K 3.5 - 5.1 mmol/L 4.6   Cl 97 - 108 mmol/L 107   CO2 21 - 32 mmol/L 33 (H)   Glucose 65 - 100 mg/dL 88     Repeat testing done today. Will follow up when available. SEROLOGIES:  Serologies Latest Ref Rng & Units 9/25/2020   Hep A Ab, Total Negative   Negative   Hep B Surface Ag Index <0.10   Hep B Surface Ag Interp Negative   Negative   Hep B Core Ab, Total Negative   Positive (A)   Hep B Surface Ab mIU/mL <3.10   Hep B Surface Ab Interp NONREACTIVE   NONREACTIVE     Serologies Latest Ref Rng & Units 7/30/2020   Hep C Genotype  1a   HCV RT-PCR, Quant IU/mL 3,440,000   HCV Log10 log10 IU/mL 6.537     Repeat testing done today. Will follow up when available. LIVER HISTOLOGY:  9/2020. HCV Fibrosure 0.07 which correlates with no fibrosis. ENDOSCOPIC PROCEDURES:  Not available or performed    RADIOLOGY:  9/2019. CT scan abdomen without IV contrast.  Normal appearing liver. No liver mass lesions. Normal spleen. No ascites. 10/2020. Ultrasound of liver. Normal echotexture with no liver mass or lesion. No ductal dilatation. OTHER TESTING:  Not available or performed    FOLLOW-UP:  All of the issues listed above in the Assessment and Plan were discussed with the patient. All questions were answered. The patient expressed a clear understanding of the above. [facility] in 2 months to evaluate ongoing response to treatment. SHANTE Mckeon-BC  [facility] of [address] [address] [address] [place], [address] [facility], [facility] 22.  [address] [facility]

## 2020-12-03 RX ORDER — PROMETHAZINE HYDROCHLORIDE 25 MG/1
25 TABLET ORAL
Qty: 30 TAB | Refills: 0 | Status: SHIPPED | OUTPATIENT
Start: 2020-12-03 | End: 2021-01-25 | Stop reason: ALTCHOICE

## 2020-12-03 RX ORDER — METHYLPREDNISOLONE 4 MG/1
TABLET ORAL
Qty: 21 DOSE PACK | Refills: 0 | Status: SHIPPED | OUTPATIENT
Start: 2020-12-03 | End: 2021-01-25 | Stop reason: ALTCHOICE

## 2020-12-04 LAB
ALBUMIN SERPL-MCNC: 3.6 G/DL (ref 3.5–5)
ALBUMIN/GLOB SERPL: 1.1 {RATIO} (ref 1.1–2.2)
ALP SERPL-CCNC: 84 U/L (ref 45–117)
ALT SERPL-CCNC: 36 U/L (ref 12–78)
ANION GAP SERPL CALC-SCNC: 5 MMOL/L (ref 5–15)
AST SERPL-CCNC: 46 U/L (ref 15–37)
BASOPHILS # BLD: 0 K/UL (ref 0–0.1)
BASOPHILS NFR BLD: 0 % (ref 0–1)
BILIRUB DIRECT SERPL-MCNC: 0.1 MG/DL (ref 0–0.2)
BILIRUB SERPL-MCNC: 0.3 MG/DL (ref 0.2–1)
BUN SERPL-MCNC: 13 MG/DL (ref 6–20)
BUN/CREAT SERPL: 19 (ref 12–20)
CALCIUM SERPL-MCNC: 9.2 MG/DL (ref 8.5–10.1)
CHLORIDE SERPL-SCNC: 105 MMOL/L (ref 97–108)
CO2 SERPL-SCNC: 34 MMOL/L (ref 21–32)
CREAT SERPL-MCNC: 0.69 MG/DL (ref 0.7–1.3)
DIFFERENTIAL METHOD BLD: NORMAL
EOSINOPHIL # BLD: 0.1 K/UL (ref 0–0.4)
EOSINOPHIL NFR BLD: 1 % (ref 0–7)
ERYTHROCYTE [DISTWIDTH] IN BLOOD BY AUTOMATED COUNT: 13.2 % (ref 11.5–14.5)
GLOBULIN SER CALC-MCNC: 3.2 G/DL (ref 2–4)
GLUCOSE SERPL-MCNC: 70 MG/DL (ref 65–100)
HCT VFR BLD AUTO: 41.8 % (ref 36.6–50.3)
HCV RNA SERPL NAA+PROBE-ACNC: NORMAL IU/ML
HCV RNA SERPL NAA+PROBE-LOG IU: NORMAL LOG10 IU/ML
HCV RNA SERPL QL NAA+PROBE: POSITIVE
HGB BLD-MCNC: 13.3 G/DL (ref 12.1–17)
IMM GRANULOCYTES # BLD AUTO: 0 K/UL (ref 0–0.04)
IMM GRANULOCYTES NFR BLD AUTO: 0 % (ref 0–0.5)
LYMPHOCYTES # BLD: 1.9 K/UL (ref 0.8–3.5)
LYMPHOCYTES NFR BLD: 21 % (ref 12–49)
MCH RBC QN AUTO: 29 PG (ref 26–34)
MCHC RBC AUTO-ENTMCNC: 31.8 G/DL (ref 30–36.5)
MCV RBC AUTO: 91.3 FL (ref 80–99)
MONOCYTES # BLD: 0.4 K/UL (ref 0–1)
MONOCYTES NFR BLD: 5 % (ref 5–13)
NEUTS SEG # BLD: 6.6 K/UL (ref 1.8–8)
NEUTS SEG NFR BLD: 73 % (ref 32–75)
NRBC # BLD: 0 K/UL (ref 0–0.01)
NRBC BLD-RTO: 0 PER 100 WBC
PLATELET # BLD AUTO: 175 K/UL (ref 150–400)
PMV BLD AUTO: 11.4 FL (ref 8.9–12.9)
POTASSIUM SERPL-SCNC: 4.9 MMOL/L (ref 3.5–5.1)
PROT SERPL-MCNC: 6.8 G/DL (ref 6.4–8.2)
RBC # BLD AUTO: 4.58 M/UL (ref 4.1–5.7)
SODIUM SERPL-SCNC: 144 MMOL/L (ref 136–145)
TEST INFORMATION:, 550031: NORMAL
WBC # BLD AUTO: 9 K/UL (ref 4.1–11.1)

## 2020-12-08 NOTE — PROGRESS NOTES
Letter sent to the patient regarding blood work results. The liver numbers have improved and the virus is currently undetectable. Will continue medication as prescribed.

## 2021-01-05 DIAGNOSIS — F90.2 ATTENTION DEFICIT HYPERACTIVITY DISORDER (ADHD), COMBINED TYPE: ICD-10-CM

## 2021-01-05 RX ORDER — DEXTROAMPHETAMINE SACCHARATE, AMPHETAMINE ASPARTATE, DEXTROAMPHETAMINE SULFATE AND AMPHETAMINE SULFATE 5; 5; 5; 5 MG/1; MG/1; MG/1; MG/1
20 TABLET ORAL 2 TIMES DAILY
Qty: 60 TAB | Refills: 0 | Status: SHIPPED | OUTPATIENT
Start: 2021-01-05 | End: 2021-02-01 | Stop reason: SDUPTHER

## 2021-01-05 NOTE — TELEPHONE ENCOUNTER
----- Message from [address] sent at 1/5/2021 10:52 AM EST -----  Regarding: Dr. Rula Sawyer (if not patient): Jeffry Hope      Relationship of caller (if not patient): Pt's Mother      Best contact number(s): [phone]      Name of medication and dosage if known: dextroamphetamine-amphetamine (ADDERALL) 20 mg      Is patient out of this medication (yes/no): Yes      Pharmacy name: [id number] listed in chart? (yes/no): Yes  Pharmacy phone number:      Details to clarify the request: Requesting refill on Adderall 20 mg rx to be sent to [facility] pharmacy on file.       Kelle Toth Done

## 2021-01-14 RX ORDER — TRAZODONE HYDROCHLORIDE 100 MG/1
TABLET ORAL
Qty: 30 TAB | Refills: 2 | Status: SHIPPED | OUTPATIENT
Start: 2021-01-14 | End: 2021-01-25 | Stop reason: ALTCHOICE

## 2021-01-22 RX ORDER — SILDENAFIL 100 MG/1
TABLET, FILM COATED ORAL
Qty: 6 TAB | Refills: 12 | Status: SHIPPED | OUTPATIENT
Start: 2021-01-22 | End: 2021-01-25 | Stop reason: SDUPTHER

## 2021-01-25 ENCOUNTER — OFFICE VISIT (OUTPATIENT)
Dept: FAMILY MEDICINE CLINIC | Age: 41
End: 2021-01-25
Payer: COMMERCIAL

## 2021-01-25 VITALS
WEIGHT: 166.6 LBS | HEART RATE: 52 BPM | BODY MASS INDEX: 24.68 KG/M2 | HEIGHT: 69 IN | RESPIRATION RATE: 16 BRPM | TEMPERATURE: 97.1 F | SYSTOLIC BLOOD PRESSURE: 114 MMHG | OXYGEN SATURATION: 97 % | DIASTOLIC BLOOD PRESSURE: 72 MMHG

## 2021-01-25 DIAGNOSIS — F32.A DEPRESSION, UNSPECIFIED DEPRESSION TYPE: ICD-10-CM

## 2021-01-25 DIAGNOSIS — R45.4 IRRITABILITY: Primary | ICD-10-CM

## 2021-01-25 PROCEDURE — 99213 OFFICE O/P EST LOW 20 MIN: CPT | Performed by: FAMILY MEDICINE

## 2021-01-25 RX ORDER — SILDENAFIL 100 MG/1
TABLET, FILM COATED ORAL
Qty: 6 TAB | Refills: 12 | Status: SHIPPED | OUTPATIENT
Start: 2021-01-25

## 2021-01-25 RX ORDER — DULOXETIN HYDROCHLORIDE 60 MG/1
CAPSULE, DELAYED RELEASE ORAL
Qty: 30 CAP | Refills: 5 | Status: SHIPPED | OUTPATIENT
Start: 2021-01-25 | End: 2021-07-26

## 2021-01-25 NOTE — PROGRESS NOTES
Chief Complaint   Patient presents with    Follow Up Chronic Condition     1. Have you been to the ER, urgent care clinic since your last visit? Hospitalized since your last visit? No    2. Have you seen or consulted any other health care providers outside of the [address] [place] [address] since your last visit? Include any pap smears or colon screening.  [facility]

## 2021-01-25 NOTE — PROGRESS NOTES
HISTORY OF PRESENT ILLNESS  Junior Engle III is a 40 y.o. male.  HPI In for recheck. Needs several meds refilled. Says that is losing his temper easily. Doesn't think that depakote has helped him that much. Has tried seroquel in the past, but made him too sleepy. Not working at present.     ROS    Physical Exam  Vitals signs and nursing note reviewed.   Constitutional:       Appearance: He is well-developed.   HENT:      Right Ear: External ear normal.      Left Ear: External ear normal.   Neck:      Thyroid: No thyromegaly.   Cardiovascular:      Rate and Rhythm: Normal rate and regular rhythm.      Heart sounds: Normal heart sounds.   Pulmonary:      Effort: Pulmonary effort is normal. No respiratory distress.      Breath sounds: Normal breath sounds. No wheezing.   Abdominal:      General: Bowel sounds are normal. There is no distension.      Palpations: Abdomen is soft. There is no mass.      Tenderness: There is no abdominal tenderness. There is no guarding.   Musculoskeletal: Normal range of motion.   Lymphadenopathy:      Cervical: No cervical adenopathy.         ASSESSMENT and PLAN  Orders Placed This Encounter   • sildenafil citrate (VIAGRA) 100 mg tablet   • DULoxetine (CYMBALTA) 60 mg capsule     Diagnoses and all orders for this visit:    1. Irritability    2. Depression, unspecified depression type    Other orders  -     sildenafil citrate (VIAGRA) 100 mg tablet; 1/2 - 1 tab po one hour before sex on an empty stomach  -     DULoxetine (CYMBALTA) 60 mg capsule; TAKE 1 CAPSULE BY MOUTH EVERY DAY      Follow-up and Dispositions    · Return in about 4 months (around 5/25/2021).

## 2021-02-01 DIAGNOSIS — F90.2 ATTENTION DEFICIT HYPERACTIVITY DISORDER (ADHD), COMBINED TYPE: ICD-10-CM

## 2021-02-01 RX ORDER — DEXTROAMPHETAMINE SACCHARATE, AMPHETAMINE ASPARTATE, DEXTROAMPHETAMINE SULFATE AND AMPHETAMINE SULFATE 5; 5; 5; 5 MG/1; MG/1; MG/1; MG/1
20 TABLET ORAL 2 TIMES DAILY
Qty: 60 TAB | Refills: 0 | Status: SHIPPED | OUTPATIENT
Start: 2021-02-01 | End: 2021-03-08 | Stop reason: SDUPTHER

## 2021-02-01 NOTE — TELEPHONE ENCOUNTER
----- Message from [place] Zhao sent at 2/1/2021  3:04 PM EST -----  Regarding: Dr. Stephanie Ashby: [phone]  Medication Refill    Caller (if not patient): Jenni Antunez      Relationship of caller (if not patient):  Mother      Best contact number(s):[phone]      Name of medication and dosage if known: \"Dextroamphetamine\", 20 mg      Is patient out of this medication (yes/no):Yes      Pharmacy name: [facility]    Pharmacy listed in chart? (yes/no): Yes  Pharmacy phone number: N/A      Details to clarify the request: N/A      Leafy Zhao

## 2021-02-15 ENCOUNTER — OFFICE VISIT (OUTPATIENT)
Dept: HEMATOLOGY | Age: 41
End: 2021-02-15
Payer: MEDICAID

## 2021-02-15 VITALS
HEIGHT: 69 IN | HEART RATE: 58 BPM | RESPIRATION RATE: 18 BRPM | BODY MASS INDEX: 24.73 KG/M2 | SYSTOLIC BLOOD PRESSURE: 114 MMHG | DIASTOLIC BLOOD PRESSURE: 80 MMHG | TEMPERATURE: 97.6 F | OXYGEN SATURATION: 96 % | WEIGHT: 167 LBS

## 2021-02-15 DIAGNOSIS — B18.2 CHRONIC HEPATITIS C WITHOUT HEPATIC COMA (HCC): Primary | ICD-10-CM

## 2021-02-15 PROCEDURE — 99214 OFFICE O/P EST MOD 30 MIN: CPT | Performed by: NURSE PRACTITIONER

## 2021-02-15 NOTE — PROGRESS NOTES
Identified pt with two pt identifiers(name and ). Reviewed record in preparation for visit and have obtained necessary documentation. No chief complaint on file. Vitals:    02/15/21 1426   BP: 114/80   Pulse: (!) 58   Resp: 18   Temp: 97.6 °F (36.4 °C)   TempSrc: Temporal   SpO2: 96%   Weight: 167 lb (75.8 kg)   Height: 5' 9\" (1.753 m)   PainSc:   8   PainLoc: Abdomen       Health Maintenance Review: Patient reminded of \"due or due soon\" health maintenance. I have asked the patient to contact his/her primary care provider (PCP) for follow-up on his/her health maintenance. Coordination of Care Questionnaire:  :   1) Have you been to an emergency room, urgent care, or hospitalized since your last visit? If yes, where when, and reason for visit? no       2. Have seen or consulted any other health care provider since your last visit? If yes, where when, and reason for visit? NO      Patient is accompanied by self I have received verbal consent from Greta Shelton III to discuss any/all medical information while they are present in the room.

## 2021-02-15 NOTE — PROGRESS NOTES
[address] [place] [address], MD, MD Cheri Jay PA-C Rebecca Purdue, [facility]     April S Mike, [facility]   FARSHAD TraoreP-DIANNE Gordon, [facility]       Olive [address]    at [address], [address] [place] [address] 22. [phone]    FAX: [address] [place] [address], [address]    [phone]    FAX: [phone]       Patient Care Team:  Yasemin Mercado MD as PCP - Darryle Frees, MD as PCP - [facility]  Char Neil, RN as Nurse Navigator  Torrey Etienne RN as Nurse Navigator      Problem List  Date Reviewed: 2/15/2021          Codes Class Noted    ADD (attention deficit disorder) ICD-10-CM: F98.8  ICD-9-CM: 314.00  9/25/2020        Peptic ulcer disease ICD-10-CM: K27.9  ICD-9-CM: 533.90  6/26/2016        Chronic viral hepatitis C ([facility] 75.) ICD-10-CM: B18.2  ICD-9-CM: 070.54  8/1/2013        Shoulder pain ICD-10-CM: M25.519  ICD-9-CM: 719.41  8/1/2013        Back pain, chronic ICD-10-CM: M54.9, G89.29  ICD-9-CM: 724.5, 338.29  8/1/2013        Knee pain, bilateral ICD-10-CM: M25.561, M25.562  ICD-9-CM: 719.46  8/1/2013        H/O intravenous drug use in remission ICD-10-CM: Z87.898  ICD-9-CM: 305.93  8/8/2012        Antisocial personality disorder ([facility] (Chronic) ICD-10-CM: F60.2  ICD-9-CM: 301.7  8/8/2012        Depression ICD-10-CM: F32.9  ICD-9-CM: 425  4/20/2011              Amanda Yadav III is being seen at [address] for management of chronic HCV. The active problem list, all pertinent past medical history, medications, radiology and laboratory findings related to the liver disorder were reviewed with the patient.       The patient is a 38 y.o.  male who was found to have abnormalities in liver chemistries and subsequently tested positive for chronic HCV in 2000. Risk factors for acquiring HCV are IV drug use in 1990s - 2018. There was no history of acute icteric hepatitis at the time of these risk factors. CT scan of the liver was performed in 9/2019. The results of the imaging demonstrated a normal appearing liver. An assessment of liver fibrosis was performed with HCV Fibrosure. The score was 0.07 which correlates with no fibrosis. The patient completed an intended 8 weeks of hepatitis C treatment with Mavyret 11/03/2020-1/2021. He tolerated the treatment well with no reported side effects. The 4 week HCV RNA was undetectable. There is ongoing fatigue, abdominal bloating, and pain which is most likely from methadone. He is unable to decrease the dose due to withdrawal symptoms. The patient has the following symptoms which are thought to be due to the liver disease:  fatigue, pain in the right side over the liver, and joint pain. The patient is not currently experiencing the following symptoms of liver disease:  problems concentrating, swelling of the abdomen, swelling of the lower xtremities, hematemesis, or hematochezia. The patient has limitations in functional activities which can be attributed to the liver disease and to other medical problems that are not related to the liver disease. ASSESSMENT AND PLAN:  Chronic HCV   Chronic HCV with no fibrosis. Severity of the liver disease was assessed by laboratory studies, Imaging, and HCV Fibrosure 9/2020. AST is elevated but improved. AST, ALP, Liver function and the platelet count are normal.      Based upon laboratory studies, HCV Fibrosure, and imaging  the patient does not appear to have significant liver injury. Have performed laboratory testing to monitor liver function and degree of liver injury.   This included BMP, hepatic panel, and CBC with platelet count. Chronic HCV Treatment  The patient has HCV genotype 1A. The patient the patient completed an intended 8 weeks of hepatitis C treatment with Kaden Rita (glecaprevir and piprentasvir)11/03/2020-1/2021. The 4 week HCV RNA was undetectable. A repeat HCV RNA will be ordered today to evaluate ongoing response to treatment. Abdominal Discomfort  This is most likely constipation and bloating from methadone. He will need to be referred to GI for further workup due to past history of colitis. Advised he consider suboxone or discuss weaning methadone with the current provider. Provided phone numbers for support/counseling that may help with past trauma and mental health. Screening for Hepatocellular Carcinoma  HCC screening is not necessary if the patient has no evidence of cirrhosis. Treatment of other medical problems in patients with chronic liver disease  There are no contraindications for the patient to take most medications that are necessary for treatment of other medical issues. Counseling for alcohol in patients with chronic liver disease  The patient was counseled regarding alcohol consumption and the effect of alcohol on chronic liver disease. The patient does not consume any significant amount of alcohol. Substance Use  The patient was counseled regarding the risk of overdose and death from using opioids. Discussed the risk of becoming reinfected with HCV once they are cured if they resume IV drug use or inhaling drugs nasally. The patient has not used drugs since 2018. Vaccinations   Vaccination for viral hepatitis A is recommended since the patient has no serologic evidence of previous exposure or vaccination with immunity. Vaccination for viral hepatitis B is not needed. The patient has serologic evidence of prior exposure or vaccination with immunity.   Routine vaccinations against other bacterial and viral agents can be performed as indicated. Annual flu vaccination should be administered if indicated. ALLERGIES  Allergies   Allergen Reactions    Ibuprofen Nausea and Vomiting     GI upset; bleeding of the stomach per patient     Nsaids (Non-Steroidal Anti-Inflammatory Drug) Anaphylaxis    Tramadol Seizures    Tylenol [Acetaminophen] Unproven on Challenge     Patient states that he cannot take tylenol because \"its bad for my liver\". Pt reports Hep C and that MD had told him to not take tylenol in the past.        MEDICATIONS  Current Outpatient Medications   Medication Sig    dextroamphetamine-amphetamine (ADDERALL) 20 mg tablet Take 1 Tab by mouth two (2) times a day. Max Daily Amount: 40 mg.    sildenafil citrate (VIAGRA) 100 mg tablet 1/2 - 1 tab po one hour before sex on an empty stomach    DULoxetine (CYMBALTA) 60 mg capsule TAKE 1 CAPSULE BY MOUTH EVERY DAY    divalproex DR (DEPAKOTE) 500 mg tablet Take 1 Tab by mouth two (2) times a day. Indications: Patient only taking once a day    methadone HCl (METHADONE PO) Take 80 mg by mouth daily.  metoprolol tartrate (LOPRESSOR) 50 mg tablet TAKE 1 TAB BY MOUTH TWO (2) TIMES A DAY. FOR BLOOD PRESSURE     No current facility-administered medications for this visit. SYSTEM REVIEW NOT RELATED TO LIVER DISEASE OR REVIEWED ABOVE:  Constitution systems: Negative for fever, chills, weight gain, weight loss. Eyes: Negative for visual changes. ENT: Negative for sore throat, painful swallowing. Respiratory: Negative for cough, hemoptysis, SOB. Cardiology: Negative for chest pain, palpitations. GI:  Negative for constipation or diarrhea. : Negative for urinary frequency, dysuria, hematuria, nocturia. Skin: Negative for rash. Hematology: Negative for easy bruising, blood clots. Musculo-skeletal: Negative for back pain, muscle pain, weakness. Neurologic: Negative for headaches, dizziness, vertigo, memory problems not related to HE.   Psychology: Negative for anxiety, depression. FAMILY HISTORY:  The father  of CHF. The mother Has/had the following chronic disease(s): anxiety, heart disease. There is no family history of liver disease. SOCIAL HISTORY:  The patient has never been . The patient has no children. The patient currently smokes 1/2 pack of tobacco daily. The patient has never consumed significant amounts of alcohol. The patient does not work. PHYSICAL EXAMINATION:  Visit Vitals  /80 (BP 1 Location: Right upper arm, BP Patient Position: Sitting, BP Cuff Size: Adult)   Pulse (!) 58   Temp 97.6 °F (36.4 °C) (Temporal)   Resp 18   Ht 5' 9\" (1.753 m)   Wt 167 lb (75.8 kg)   SpO2 96%   BMI 24.66 kg/m²     General: No acute distress. Eyes: Sclera anicteric. ENT: No oral lesions. Thyroid normal.  Nodes: No adenopathy. Skin: No spider angiomata. No jaundice. No palmar erythema. Respiratory: Lungs clear to auscultation. Cardiovascular: Regular heart rate. No murmurs. No JVD. Abdomen: Soft non-tender. Liver size normal to percussion/palpation. Spleen not palpable. No obvious ascites. Extremities: No edema. No muscle wasting. No gross arthritic changes. Neurologic: Alert and oriented. Cranial nerves grossly intact. No asterixis. LABORATORY STUDIES:  Liver [place] [address] & Units 2020 10/19/2020   WBC 4.1 - 11.1 K/uL 9.0 7.7   ANC 1.8 - 8.0 K/UL 6.6 5.2   HGB 12.1 - 17.0 g/dL 13.3 11.3 (L)    - 400 K/uL 175 155   AST 15 - 37 U/L 46 (H) 148 (H)   ALT 12 - 78 U/L 36 122 (H)   Alk Phos 45 - 117 U/L 84 69   Bili, Total 0.2 - 1.0 MG/DL 0.3 0.3   Bili, Direct 0.0 - 0.2 MG/DL 0.1    Albumin 3.5 - 5.0 g/dL 3.6 2.7 (L)   BUN 6 - 20 MG/DL 13 15   Creat 0.70 - 1.30 MG/DL 0.69 (L) 0.70   Na 136 - 145 mmol/L 144 142   K 3.5 - 5.1 mmol/L 4.9 3.5   Cl 97 - 108 mmol/L 105 105   CO2 21 - 32 mmol/L 34 (H) 34 (H)   Glucose 65 - 100 mg/dL 70 107 (H)     Repeat testing done today.  Will follow up when available. SEROLOGIES:  Serologies Latest Ref Rng & Units 9/25/2020   Hep A Ab, Total Negative   Negative   Hep B Surface Ag Index <0.10   Hep B Surface Ag Interp Negative   Negative   Hep B Core Ab, Total Negative   Positive (A)   Hep B Surface Ab mIU/mL <3.10   Hep B Surface Ab Interp NONREACTIVE   NONREACTIVE     Serologies Latest Ref Rng & Units 7/30/2020   Hep C Genotype  1a   HCV RT-PCR, Quant IU/mL 3,440,000   HCV Log10 log10 IU/mL 6.537     12/2020. HCV RNA undetectable. Repeat testing done today. Will follow up when available. LIVER HISTOLOGY:  9/2020. HCV Fibrosure 0.07 which correlates with no fibrosis. ENDOSCOPIC PROCEDURES:  Not available or performed    RADIOLOGY:  9/2019. CT scan abdomen without IV contrast.  Normal appearing liver. No liver mass lesions. Normal spleen. No ascites. 10/2020. Ultrasound of liver. Normal echotexture with no liver mass or lesion. No ductal dilatation. OTHER TESTING:  Not available or performed    FOLLOW-UP:  All of the issues listed above in the Assessment and Plan were discussed with the patient. All questions were answered. The patient expressed a clear understanding of the above. [facility] 87 in 3 months to evaluate ongoing response to treatment. If the HCV RNA remains undetectable at the next office visit he will be considered a sustained viral responder and cured of hepatitis C. SHANTE Mckeon-BC  [address] [address] [address], [address] [facility] 22.  [address] [facility]

## 2021-02-16 LAB
ALBUMIN SERPL-MCNC: 4.2 G/DL (ref 4–5)
ALP SERPL-CCNC: 69 IU/L (ref 39–117)
ALT SERPL-CCNC: 16 IU/L (ref 0–44)
AST SERPL-CCNC: 20 IU/L (ref 0–40)
BASOPHILS # BLD AUTO: 0 X10E3/UL (ref 0–0.2)
BASOPHILS NFR BLD AUTO: 0 %
BILIRUB DIRECT SERPL-MCNC: 0.07 MG/DL (ref 0–0.4)
BILIRUB SERPL-MCNC: <0.2 MG/DL (ref 0–1.2)
BUN SERPL-MCNC: 18 MG/DL (ref 6–24)
BUN/CREAT SERPL: 23 (ref 9–20)
CALCIUM SERPL-MCNC: 9.5 MG/DL (ref 8.7–10.2)
CHLORIDE SERPL-SCNC: 102 MMOL/L (ref 96–106)
CO2 SERPL-SCNC: 22 MMOL/L (ref 20–29)
CREAT SERPL-MCNC: 0.78 MG/DL (ref 0.76–1.27)
EOSINOPHIL # BLD AUTO: 0.1 X10E3/UL (ref 0–0.4)
EOSINOPHIL NFR BLD AUTO: 2 %
ERYTHROCYTE [DISTWIDTH] IN BLOOD BY AUTOMATED COUNT: 12.8 % (ref 11.6–15.4)
GLUCOSE SERPL-MCNC: 71 MG/DL (ref 65–99)
HCT VFR BLD AUTO: 40.6 % (ref 37.5–51)
HGB BLD-MCNC: 13.8 G/DL (ref 13–17.7)
IMM GRANULOCYTES # BLD AUTO: 0 X10E3/UL (ref 0–0.1)
IMM GRANULOCYTES NFR BLD AUTO: 0 %
LYMPHOCYTES # BLD AUTO: 2.3 X10E3/UL (ref 0.7–3.1)
LYMPHOCYTES NFR BLD AUTO: 38 %
MCH RBC QN AUTO: 29.2 PG (ref 26.6–33)
MCHC RBC AUTO-ENTMCNC: 34 G/DL (ref 31.5–35.7)
MCV RBC AUTO: 86 FL (ref 79–97)
MONOCYTES # BLD AUTO: 0.4 X10E3/UL (ref 0.1–0.9)
MONOCYTES NFR BLD AUTO: 7 %
NEUTROPHILS # BLD AUTO: 3.1 X10E3/UL (ref 1.4–7)
NEUTROPHILS NFR BLD AUTO: 53 %
PLATELET # BLD AUTO: 204 X10E3/UL (ref 150–450)
POTASSIUM SERPL-SCNC: 4.6 MMOL/L (ref 3.5–5.2)
PROT SERPL-MCNC: 6.4 G/DL (ref 6–8.5)
RBC # BLD AUTO: 4.72 X10E6/UL (ref 4.14–5.8)
SODIUM SERPL-SCNC: 141 MMOL/L (ref 134–144)
WBC # BLD AUTO: 5.9 X10E3/UL (ref 3.4–10.8)

## 2021-02-17 LAB — HCV RNA SERPL QL NAA+PROBE: NEGATIVE

## 2021-02-19 NOTE — PROGRESS NOTES
Letter sent to the patient regarding the blood work results. The liver numbers are now normal and the hepatitis C remains undetectable. We will see him back in 3 months to determine if he is a sustained viral responder.

## 2021-03-15 RX ORDER — METOPROLOL TARTRATE 50 MG/1
TABLET ORAL
Qty: 180 TAB | Refills: 3 | Status: SHIPPED | OUTPATIENT
Start: 2021-03-15 | End: 2021-04-16 | Stop reason: ALTCHOICE

## 2021-04-07 DIAGNOSIS — F90.2 ATTENTION DEFICIT HYPERACTIVITY DISORDER (ADHD), COMBINED TYPE: ICD-10-CM

## 2021-04-07 RX ORDER — DEXTROAMPHETAMINE SACCHARATE, AMPHETAMINE ASPARTATE, DEXTROAMPHETAMINE SULFATE AND AMPHETAMINE SULFATE 5; 5; 5; 5 MG/1; MG/1; MG/1; MG/1
20 TABLET ORAL 2 TIMES DAILY
Qty: 60 TAB | Refills: 0 | Status: SHIPPED | OUTPATIENT
Start: 2021-04-07 | End: 2021-05-04 | Stop reason: SDUPTHER

## 2021-04-07 NOTE — TELEPHONE ENCOUNTER
Patient mother is requesting a RX refill dextroamphetamine-amphetamine (ADDERALL) 20 mg tablet she can be reached @ [phone]

## 2021-04-09 ENCOUNTER — APPOINTMENT (OUTPATIENT)
Dept: CT IMAGING | Age: 41
End: 2021-04-09
Attending: EMERGENCY MEDICINE
Payer: MEDICAID

## 2021-04-09 ENCOUNTER — HOSPITAL ENCOUNTER (EMERGENCY)
Age: 41
Discharge: HOME | End: 2021-04-09
Attending: EMERGENCY MEDICINE
Payer: MEDICAID

## 2021-04-09 VITALS
HEIGHT: 70 IN | TEMPERATURE: 97.7 F | RESPIRATION RATE: 16 BRPM | OXYGEN SATURATION: 100 % | SYSTOLIC BLOOD PRESSURE: 138 MMHG | WEIGHT: 169.09 LBS | HEART RATE: 62 BPM | BODY MASS INDEX: 24.21 KG/M2 | DIASTOLIC BLOOD PRESSURE: 89 MMHG

## 2021-04-09 DIAGNOSIS — R10.84 ABDOMINAL PAIN, GENERALIZED: Primary | ICD-10-CM

## 2021-04-09 DIAGNOSIS — K62.5 RECTAL BLEEDING: ICD-10-CM

## 2021-04-09 LAB
ALBUMIN SERPL-MCNC: 3.6 G/DL (ref 3.5–5)
ALBUMIN/GLOB SERPL: 1.1 {RATIO} (ref 1.1–2.2)
ALP SERPL-CCNC: 62 U/L (ref 45–117)
ALT SERPL-CCNC: 17 U/L (ref 12–78)
ANION GAP SERPL CALC-SCNC: 6 MMOL/L (ref 5–15)
APPEARANCE UR: CLEAR
AST SERPL-CCNC: 14 U/L (ref 15–37)
BACTERIA URNS QL MICRO: NEGATIVE /HPF
BILIRUB SERPL-MCNC: 0.2 MG/DL (ref 0.2–1)
BILIRUB UR QL CFM: NEGATIVE
BUN SERPL-MCNC: 17 MG/DL (ref 6–20)
BUN/CREAT SERPL: 20 (ref 12–20)
CALCIUM SERPL-MCNC: 8.6 MG/DL (ref 8.5–10.1)
CAOX CRY URNS QL MICRO: ABNORMAL
CHLORIDE SERPL-SCNC: 107 MMOL/L (ref 97–108)
CO2 SERPL-SCNC: 29 MMOL/L (ref 21–32)
COLOR UR: ABNORMAL
CREAT SERPL-MCNC: 0.85 MG/DL (ref 0.7–1.3)
EPITH CASTS URNS QL MICRO: ABNORMAL /LPF
ERYTHROCYTE [DISTWIDTH] IN BLOOD BY AUTOMATED COUNT: 12.7 % (ref 11.5–14.5)
GLOBULIN SER CALC-MCNC: 3.2 G/DL (ref 2–4)
GLUCOSE SERPL-MCNC: 116 MG/DL (ref 65–100)
GLUCOSE UR STRIP.AUTO-MCNC: NEGATIVE MG/DL
HCT VFR BLD AUTO: 40.5 % (ref 36.6–50.3)
HGB BLD-MCNC: 13.5 G/DL (ref 12.1–17)
HGB UR QL STRIP: NEGATIVE
KETONES UR QL STRIP.AUTO: NEGATIVE MG/DL
LEUKOCYTE ESTERASE UR QL STRIP.AUTO: NEGATIVE
MCH RBC QN AUTO: 28.8 PG (ref 26–34)
MCHC RBC AUTO-ENTMCNC: 33.3 G/DL (ref 30–36.5)
MCV RBC AUTO: 86.4 FL (ref 80–99)
NITRITE UR QL STRIP.AUTO: NEGATIVE
NRBC # BLD: 0 K/UL (ref 0–0.01)
NRBC BLD-RTO: 0 PER 100 WBC
PH UR STRIP: 6 [PH] (ref 5–8)
PLATELET # BLD AUTO: 165 K/UL (ref 150–400)
PMV BLD AUTO: 11 FL (ref 8.9–12.9)
POTASSIUM SERPL-SCNC: 4 MMOL/L (ref 3.5–5.1)
PROT SERPL-MCNC: 6.8 G/DL (ref 6.4–8.2)
PROT UR STRIP-MCNC: NEGATIVE MG/DL
RBC # BLD AUTO: 4.69 M/UL (ref 4.1–5.7)
RBC #/AREA URNS HPF: ABNORMAL /HPF (ref 0–5)
SODIUM SERPL-SCNC: 142 MMOL/L (ref 136–145)
SP GR UR REFRACTOMETRY: 1.03 (ref 1–1.03)
UA: UC IF INDICATED,UAUC: ABNORMAL
UROBILINOGEN UR QL STRIP.AUTO: 1 EU/DL (ref 0.2–1)
WBC # BLD AUTO: 5 K/UL (ref 4.1–11.1)
WBC URNS QL MICRO: ABNORMAL /HPF (ref 0–4)

## 2021-04-09 PROCEDURE — 85027 COMPLETE CBC AUTOMATED: CPT

## 2021-04-09 PROCEDURE — 74011250636 HC RX REV CODE- 250/636: Performed by: EMERGENCY MEDICINE

## 2021-04-09 PROCEDURE — 80053 COMPREHEN METABOLIC PANEL: CPT

## 2021-04-09 PROCEDURE — 81001 URINALYSIS AUTO W/SCOPE: CPT

## 2021-04-09 PROCEDURE — 96361 HYDRATE IV INFUSION ADD-ON: CPT

## 2021-04-09 PROCEDURE — 74011000636 HC RX REV CODE- 636: Performed by: EMERGENCY MEDICINE

## 2021-04-09 PROCEDURE — 36415 COLL VENOUS BLD VENIPUNCTURE: CPT

## 2021-04-09 PROCEDURE — 99283 EMERGENCY DEPT VISIT LOW MDM: CPT

## 2021-04-09 PROCEDURE — 96360 HYDRATION IV INFUSION INIT: CPT

## 2021-04-09 PROCEDURE — 74011000250 HC RX REV CODE- 250: Performed by: EMERGENCY MEDICINE

## 2021-04-09 PROCEDURE — 74178 CT ABD&PLV WO CNTR FLWD CNTR: CPT

## 2021-04-09 PROCEDURE — 74011250637 HC RX REV CODE- 250/637: Performed by: EMERGENCY MEDICINE

## 2021-04-09 RX ADMIN — SODIUM CHLORIDE 1000 ML: 9 INJECTION, SOLUTION INTRAVENOUS at 11:52

## 2021-04-09 RX ADMIN — LIDOCAINE HYDROCHLORIDE 40 ML: 20 SOLUTION ORAL; TOPICAL at 13:15

## 2021-04-09 RX ADMIN — IOPAMIDOL 100 ML: 755 INJECTION, SOLUTION INTRAVENOUS at 11:21

## 2021-04-09 NOTE — ED PROVIDER NOTES
EMERGENCY DEPARTMENT HISTORY AND PHYSICAL EXAM      Date: 4/9/2021  Patient Name: Danika Jorgensen III    History of Presenting Illness     Chief Complaint   Patient presents with    Rectal Bleeding     Blood in stool for 1 month, reports \"ribbons\"    Abdominal Pain     Pain across low abd for 1 month, worse today. difficulty with urination and defecation reported. HPI: Zuleyka Larsen, 38 y.o. male with history of opiate abuse on methadone presenting to ED with chief complaint of stringy stools with bright red blood in toilet, abdominal pain. Lower abdomen. several days of symptoms. He has not had any vomiting. He has no other symptoms. There are no other complaints, changes, or physical findings at this time. PCP: Dana Richardson MD    No current facility-administered medications on file prior to encounter. Current Outpatient Medications on File Prior to Encounter   Medication Sig Dispense Refill    dextroamphetamine-amphetamine (ADDERALL) 20 mg tablet Take 1 Tab by mouth two (2) times a day. Max Daily Amount: 40 mg. 60 Tab 0    metoprolol tartrate (LOPRESSOR) 50 mg tablet TAKE 1 TAB BY MOUTH TWO (2) TIMES A DAY. FOR BLOOD PRESSURE 180 Tab 3    sildenafil citrate (VIAGRA) 100 mg tablet 1/2 - 1 tab po one hour before sex on an empty stomach 6 Tab 12    DULoxetine (CYMBALTA) 60 mg capsule TAKE 1 CAPSULE BY MOUTH EVERY DAY 30 Cap 5    divalproex DR (DEPAKOTE) 500 mg tablet Take 1 Tab by mouth two (2) times a day. Indications: Patient only taking once a day 60 Tab 5    methadone HCl (METHADONE PO) Take 80 mg by mouth daily.          Past History     Past Medical History:  Past Medical History:   Diagnosis Date    Arthritis     Hypertension     Ill-defined condition     Hep C    Infectious disease     Hep C    Other ill-defined conditions(799.89)     hepatitis C, kidney stones    Other ill-defined conditions(799.89)     DDD    Psychiatric disorder     prescription drug abuse       Past Surgical History:  Past Surgical History:   Procedure Laterality Date    COLONOSCOPY N/A 6/29/2016    COLONOSCOPY performed by Fede Man MD at [facility] ENDOSCOPY    HX LITHOTRIPSY      HX UROLOGICAL      lithotripsy       Family History:  Family History   Problem Relation Age of Onset    Heart Disease Mother     Heart Disease Father     Kidney Disease Sister        Social History:  Social History     Tobacco Use    Smoking status: Current Every Day Smoker     Packs/day: 0.50     Years: 20.00     Pack years: 10.00     Types: Cigarettes    Smokeless tobacco: Never Used    Tobacco comment: smokes 4 cigars daily   Substance Use Topics    Alcohol use: No     Comment: quit approximately 1yr ago    Drug use: Yes     Types: Opiates, Heroin, Prescription, Marijuana, Cocaine     Comment: pt has hx.  pt still does marijuana       Allergies: Allergies   Allergen Reactions    Ibuprofen Nausea and Vomiting     GI upset; bleeding of the stomach per patient     Nsaids (Non-Steroidal Anti-Inflammatory Drug) Anaphylaxis    Tramadol Seizures    Tylenol [Acetaminophen] Unproven on Challenge     Patient states that he cannot take tylenol because \"its bad for my liver\". Pt reports Hep C and that MD had told him to not take tylenol in the past.          Review of Systems   Review of Systems   Constitutional: Negative for chills and fever. HENT: Negative for sore throat. Eyes: Negative for redness. Respiratory: Negative for shortness of breath. Cardiovascular: Negative for chest pain. Gastrointestinal: Positive for abdominal pain and blood in stool. Negative for vomiting. Genitourinary: Negative for dysuria. Musculoskeletal: Negative for back pain. Neurological: Negative for syncope. Psychiatric/Behavioral: The patient is not nervous/anxious. All other systems reviewed and are negative. Physical Exam   Physical Exam  Vitals signs and nursing note reviewed.    Constitutional: Appearance: Normal appearance. HENT:      Head: Normocephalic and atraumatic. Mouth/Throat:      Mouth: Mucous membranes are moist.   Neck:      Musculoskeletal: Neck supple. Cardiovascular:      Rate and Rhythm: Normal rate and regular rhythm. Pulmonary:      Effort: Pulmonary effort is normal.      Breath sounds: Normal breath sounds. Abdominal:      Palpations: Abdomen is soft. Tenderness: There is no abdominal tenderness. Musculoskeletal:         General: No deformity. Skin:     General: Skin is warm and dry. Neurological:      General: No focal deficit present. Mental Status: He is alert.    Psychiatric:         Mood and Affect: Mood normal.         Behavior: Behavior normal.         Diagnostic Study Results     Labs -     Recent Results (from the past 24 hour(s))   URINALYSIS W/ REFLEX CULTURE    Collection Time: 04/09/21  9:41 AM    Specimen: Urine    Urine specimen   Result Value Ref Range    Color YELLOW/STRAW      Appearance CLEAR CLEAR      Specific gravity 1.030 1.003 - 1.030      pH (UA) 6.0 5.0 - 8.0      Protein Negative NEG mg/dL    Glucose Negative NEG mg/dL    Ketone Negative NEG mg/dL    Blood Negative NEG      Urobilinogen 1.0 0.2 - 1.0 EU/dL    Nitrites Negative NEG      Leukocyte Esterase Negative NEG      WBC 0-4 0 - 4 /hpf    RBC 0-5 0 - 5 /hpf    Epithelial cells FEW FEW /lpf    Bacteria Negative NEG /hpf    UA:UC IF INDICATED CULTURE NOT INDICATED BY UA RESULT CNI      CA Oxalate crystals 1+ (A) NEG   BILIRUBIN, CONFIRM    Collection Time: 04/09/21  9:41 AM   Result Value Ref Range    Bilirubin UA, confirm Negative NEG     CBC W/O DIFF    Collection Time: 04/09/21  9:56 AM   Result Value Ref Range    WBC 5.0 4.1 - 11.1 K/uL    RBC 4.69 4.10 - 5.70 M/uL    HGB 13.5 12.1 - 17.0 g/dL    HCT 40.5 36.6 - 50.3 %    MCV 86.4 80.0 - 99.0 FL    MCH 28.8 26.0 - 34.0 PG    MCHC 33.3 30.0 - 36.5 g/dL    RDW 12.7 11.5 - 14.5 %    PLATELET 427 864 - 087 K/uL    MPV 11.0 8.9 - 12.9 FL    NRBC 0.0 0  WBC    ABSOLUTE NRBC 0.00 0.00 - 2.59 K/uL   METABOLIC PANEL, COMPREHENSIVE    Collection Time: 04/09/21  9:56 AM   Result Value Ref Range    Sodium 142 136 - 145 mmol/L    Potassium 4.0 3.5 - 5.1 mmol/L    Chloride 107 97 - 108 mmol/L    CO2 29 21 - 32 mmol/L    Anion gap 6 5 - 15 mmol/L    Glucose 116 (H) 65 - 100 mg/dL    BUN 17 6 - 20 MG/DL    Creatinine 0.85 0.70 - 1.30 MG/DL    BUN/Creatinine ratio 20 12 - 20      GFR est AA >60 >60 ml/min/1.73m2    GFR est non-AA >60 >60 ml/min/1.73m2    Calcium 8.6 8.5 - 10.1 MG/DL    Bilirubin, total 0.2 0.2 - 1.0 MG/DL    ALT (SGPT) 17 12 - 78 U/L    AST (SGOT) 14 (L) 15 - 37 U/L    Alk. phosphatase 62 45 - 117 U/L    Protein, total 6.8 6.4 - 8.2 g/dL    Albumin 3.6 3.5 - 5.0 g/dL    Globulin 3.2 2.0 - 4.0 g/dL    A-G Ratio 1.1 1.1 - 2.2         Radiologic Studies -   CT ABD PELV W WO CONT   Final Result      1. No evidence of active GI bleeding. 2. Diverticulosis coli. No evidence of diverticulitis. 3. Small bilateral nonobstructive renal stones. No hydronephrosis. CT Results  (Last 48 hours)               04/09/21 1220  CT ABD PELV W WO CONT Final result    Impression:      1. No evidence of active GI bleeding. 2. Diverticulosis coli. No evidence of diverticulitis. 3. Small bilateral nonobstructive renal stones. No hydronephrosis. Narrative:  EXAM: CT ABD PELV W WO CONT       INDICATION: lower abd pain, rectal bleeding       COMPARISON: 9/4/2019. CONTRAST: 100 mL of Isovue-370. TECHNIQUE:     Multislice helical CT was performed of the abdomen and pelvis before and after   contrast.  Oral contrast was not administered. Contiguous 5 mm axial images were   reconstructed and lung and soft tissue windows were generated. Coronal and   sagittal reformations were generated.   CT dose reduction was achieved through   use of a standardized protocol tailored for this examination and automatic   exposure control for dose modulation. FINDINGS:    LOWER THORAX: No significant abnormality in the incidentally imaged lower chest.   LIVER: No mass. BILIARY TREE: Gallbladder is within normal limits. CBD is not dilated. SPLEEN: within normal limits. PANCREAS: No mass or ductal dilatation. ADRENALS: Unremarkable. KIDNEYS: Small nonobstructive stones are seen in both kidneys. No   hydronephrosis. No mass. There is a 1.5 cm cyst in the posterior left kidney. STOMACH: Unremarkable. SMALL BOWEL: No dilatation or wall thickening. COLON: No dilatation or wall thickening. Multiple diverticula are present. APPENDIX: Unremarkable   PERITONEUM: No ascites or pneumoperitoneum. RETROPERITONEUM: No lymphadenopathy or aortic aneurysm. REPRODUCTIVE ORGANS: Unremarkable. URINARY BLADDER: No mass or calculus. BONES: No destructive bone lesion. ABDOMINAL WALL: No mass or hernia. ADDITIONAL COMMENTS: N/A               CXR Results  (Last 48 hours)    None            Medical Decision Making   I am the first provider for this patient. I reviewed the vital signs, available nursing notes, past medical history, past surgical history, family history and social history. Vital Signs-Reviewed the patient's vital signs. Patient Vitals for the past 24 hrs:   Temp Pulse Resp BP SpO2   04/09/21 0937 97.7 °F (36.5 °C) 62 16 138/89 100 %         Provider Notes (Medical Decision Making):   Very pleasant, well-appearing 69-year-old presenting to ED with chief complaint of abnormal stools and bright red blood per rectum. Abnormality seems to be skinny stringy stools and he feels like there is some sort of obstruction. CT demonstrates no obstruction or other acute abnormalities. His hemoglobin is normal, his BUN is normal, does not sound like massive upper GI bleed. Patient does have history of hepatitis C but this has been treated and he is doing otherwise well. His vital signs are normal and his abdominal exam is benign.   I think he safe for discharge at this time. He will plan to follow-up with GI for further evaluation. Return precautions given. ED Course:     Initial assessment performed. The patients presenting problems have been discussed, and they are in agreement with the care plan formulated and outlined with them. I have encouraged them to ask questions as they arise throughout their visit. Disposition:  dc    PLAN:  1. Discharge Medication List as of 4/9/2021  1:06 PM        2.    Follow-up Information     Follow up With Specialties Details Why Contact Info    Shan Concepcion MD Gastroenterology   [address]  [phone]          Return to ED if worse     Diagnosis     Clinical Impression: BRBPR, abnormal stool

## 2021-04-12 ENCOUNTER — TELEPHONE (OUTPATIENT)
Dept: FAMILY MEDICINE CLINIC | Age: 41
End: 2021-04-12

## 2021-04-12 NOTE — TELEPHONE ENCOUNTER
----- Message from Karina Hernandez sent at 4/9/2021  5:02 PM EDT -----  Regarding: Referral  Contact: [phone]  General Message/Vendor Calls    Caller's first and last name: Mother Marie      Reason for call: Requesting gout specialist referral that takes his insurance. Callback required yes/no and why: Yes, discussion of referral for gout specialist.       Best contact number(s):[phone]      Details to clarify the request: Patient mother stating that patient recently discharged from local ER and had blood in his genitals and lower abdominal pain. Patient is needing referral for provider that takes his insurance for gout pain. Please contact mother to provide referral information, third party verification provided.       Karina Hernandez

## 2021-04-12 NOTE — TELEPHONE ENCOUNTER
Patient seen in ER on 4/9/21, was made a GI appointment but office does not take patients insurance. Gave patient several GI names and numbers , has an appointment with PCP on Friday.

## 2021-04-16 ENCOUNTER — OFFICE VISIT (OUTPATIENT)
Dept: FAMILY MEDICINE CLINIC | Age: 41
End: 2021-04-16
Payer: MEDICAID

## 2021-04-16 VITALS
WEIGHT: 173.6 LBS | BODY MASS INDEX: 24.85 KG/M2 | HEIGHT: 70 IN | OXYGEN SATURATION: 99 % | DIASTOLIC BLOOD PRESSURE: 64 MMHG | SYSTOLIC BLOOD PRESSURE: 104 MMHG | RESPIRATION RATE: 16 BRPM | TEMPERATURE: 97.1 F | HEART RATE: 54 BPM

## 2021-04-16 DIAGNOSIS — R10.84 GENERALIZED ABDOMINAL PAIN: Primary | ICD-10-CM

## 2021-04-16 PROCEDURE — 99213 OFFICE O/P EST LOW 20 MIN: CPT | Performed by: FAMILY MEDICINE

## 2021-04-16 RX ORDER — NALOXONE HYDROCHLORIDE 4 MG/.1ML
SPRAY NASAL
COMMUNITY
Start: 2021-03-09

## 2021-04-16 RX ORDER — NICOTINE POLACRILEX 2 MG
17 LOZENGE BUCCAL DAILY
COMMUNITY
Start: 2021-04-12

## 2021-04-16 RX ORDER — BISACODYL 5 MG
TABLET, DELAYED RELEASE (ENTERIC COATED) ORAL
COMMUNITY
Start: 2021-04-12 | End: 2021-04-16 | Stop reason: ALTCHOICE

## 2021-04-16 NOTE — PROGRESS NOTES
HISTORY OF PRESENT ILLNESS  Joan Kim III is a 38 y.o. male. HPI having bilateral abdominal pain, radiates around to back. Has had symptoms for one year, the last 2 months has been constant. Has been having smaller caliber stools. Some blood at times. Also has a burning in stomach and vomiting. Says that nerves have been bad also. Not taking any meds for it. Has been taking miralax for constipation. No difficulty voiding, no hematuria. No weight loss. Had a CT abdomen and pelvis, lab work done in ER a few days ago. ROS    Physical Exam  Vitals signs and nursing note reviewed. Constitutional:       Appearance: He is well-developed. HENT:      Right Ear: External ear normal.      Left Ear: External ear normal.   Neck:      Thyroid: No thyromegaly. Cardiovascular:      Rate and Rhythm: Normal rate and regular rhythm. Heart sounds: Normal heart sounds. Pulmonary:      Effort: Pulmonary effort is normal. No respiratory distress. Breath sounds: Normal breath sounds. No wheezing. Abdominal:      General: Bowel sounds are normal. There is no distension. Palpations: Abdomen is soft. There is no mass. Tenderness: There is no abdominal tenderness. There is no guarding. Musculoskeletal: Normal range of motion. Lymphadenopathy:      Cervical: No cervical adenopathy. ASSESSMENT and PLAN  No orders of the defined types were placed in this encounter. Diagnoses and all orders for this visit:    1.  Generalized abdominal pain

## 2021-04-16 NOTE — PROGRESS NOTES
Chief Complaint   Patient presents with    Medication Refill     1. Have you been to the ER, urgent care clinic since your last visit? Hospitalized since your last visit? YES- 4/9/21 [facility]    2. Have you seen or consulted any other health care providers outside of the [address] [place] [address] since your last visit? Include any pap smears or colon screening.  Yes [phone] ([facility])

## 2021-05-04 DIAGNOSIS — F90.2 ATTENTION DEFICIT HYPERACTIVITY DISORDER (ADHD), COMBINED TYPE: ICD-10-CM

## 2021-05-04 RX ORDER — DEXTROAMPHETAMINE SACCHARATE, AMPHETAMINE ASPARTATE, DEXTROAMPHETAMINE SULFATE AND AMPHETAMINE SULFATE 5; 5; 5; 5 MG/1; MG/1; MG/1; MG/1
20 TABLET ORAL 2 TIMES DAILY
Qty: 60 TAB | Refills: 0 | Status: SHIPPED | OUTPATIENT
Start: 2021-05-04 | End: 2021-06-07 | Stop reason: SDUPTHER

## 2021-05-04 NOTE — TELEPHONE ENCOUNTER
----- Message from Candis Sy sent at 5/4/2021  2:15 PM EDT -----  Regarding: Dr. Barbara Bender (if not patient): [place]      Relationship of caller (if not patient): mother      Best contact number(s): [phone]    Name of medication and dosage if known:  Adderall      Is patient out of this medication (yes/no): will be out on 5/5/21      Pharmacy name: [facility]    Pharmacy listed in chart? (yes/no): yes  Pharmacy phone number: [phone]      Details to clarify the request:      Candis Sy

## 2021-05-19 ENCOUNTER — OFFICE VISIT (OUTPATIENT)
Dept: HEMATOLOGY | Age: 41
End: 2021-05-19
Payer: MEDICAID

## 2021-05-19 VITALS
HEART RATE: 80 BPM | OXYGEN SATURATION: 97 % | BODY MASS INDEX: 24.68 KG/M2 | HEIGHT: 70 IN | SYSTOLIC BLOOD PRESSURE: 119 MMHG | WEIGHT: 172.4 LBS | DIASTOLIC BLOOD PRESSURE: 80 MMHG | RESPIRATION RATE: 18 BRPM | TEMPERATURE: 97.9 F

## 2021-05-19 DIAGNOSIS — B18.2 CHRONIC HEPATITIS C WITHOUT HEPATIC COMA (HCC): Primary | ICD-10-CM

## 2021-05-19 LAB
ALBUMIN SERPL-MCNC: 3.9 G/DL (ref 3.5–5)
ALBUMIN/GLOB SERPL: 1.3 {RATIO} (ref 1.1–2.2)
ALP SERPL-CCNC: 75 U/L (ref 45–117)
ALT SERPL-CCNC: 33 U/L (ref 12–78)
ANION GAP SERPL CALC-SCNC: 6 MMOL/L (ref 5–15)
AST SERPL-CCNC: 22 U/L (ref 15–37)
BILIRUB DIRECT SERPL-MCNC: <0.1 MG/DL (ref 0–0.2)
BILIRUB SERPL-MCNC: 0.3 MG/DL (ref 0.2–1)
BUN SERPL-MCNC: 17 MG/DL (ref 6–20)
BUN/CREAT SERPL: 28 (ref 12–20)
CALCIUM SERPL-MCNC: 9.8 MG/DL (ref 8.5–10.1)
CHLORIDE SERPL-SCNC: 105 MMOL/L (ref 97–108)
CO2 SERPL-SCNC: 28 MMOL/L (ref 21–32)
CREAT SERPL-MCNC: 0.61 MG/DL (ref 0.7–1.3)
GLOBULIN SER CALC-MCNC: 3.1 G/DL (ref 2–4)
GLUCOSE SERPL-MCNC: 61 MG/DL (ref 65–100)
POTASSIUM SERPL-SCNC: 4.7 MMOL/L (ref 3.5–5.1)
PROT SERPL-MCNC: 7 G/DL (ref 6.4–8.2)
SODIUM SERPL-SCNC: 139 MMOL/L (ref 136–145)

## 2021-05-19 PROCEDURE — 99213 OFFICE O/P EST LOW 20 MIN: CPT | Performed by: NURSE PRACTITIONER

## 2021-05-19 NOTE — PROGRESS NOTES
Identified pt with two pt identifiers(name and ). Reviewed record in preparation for visit and have obtained necessary documentation. Chief Complaint   Patient presents with    Hepatitis C      Vitals:    21 1431   BP: 119/80   Pulse: 80   Resp: 18   Temp: 97.9 °F (36.6 °C)   TempSrc: Temporal   SpO2: 97%   Weight: 172 lb 6.4 oz (78.2 kg)   Height: 5' 10\" (1.778 m)   PainSc:   7   PainLoc: Abdomen       Health Maintenance Review: Patient reminded of \"due or due soon\" health maintenance. I have asked the patient to contact his/her primary care provider (PCP) for follow-up on his/her health maintenance. Coordination of Care Questionnaire:  :   1) Have you been to an emergency room, urgent care, or hospitalized since your last visit? If yes, where when, and reason for visit? yes   [employer] ED; Rectal Bleeding; Abdominal Pain      2. Have seen or consulted any other health care provider since your last visit? If yes, where when, and reason for visit? NO      Patient is accompanied by self I have received verbal consent from [facility] to discuss any/all medical information while they are present in the room.

## 2021-05-19 NOTE — PROGRESS NOTES
Candelaria Villatoro MD, MD Elisa Palomares, PA-DIANNE García, ACNP-BC     Rosanne LAWRENCE Mike, [facility]   Selina Wiseman, FNP-C    Mercedes Shah, [facility]       [address]    at [address], [address] [address]    [phone]    FAX: [address] [place] [address], [address], [address]    [phone]    FAX: [phone]       Patient Care Team:  Jia Shepherd MD as PCP - Mariposa Graf MD as PCP - [address] [place] [address] Provider  Julieth Cardenas, RN as Nurse Navigator  Enmanuel Hewitt RN as Nurse Navigator      Problem List  Date Reviewed: 2/15/2021        Codes Class Noted    ADD (attention deficit disorder) ICD-10-CM: F98.8  ICD-9-CM: 314.00  9/25/2020        Peptic ulcer disease ICD-10-CM: K27.9  ICD-9-CM: 533.90  6/26/2016        Chronic viral hepatitis C ([facility] ICD-10-CM: B18.2  ICD-9-CM: 070.54  8/1/2013        Shoulder pain ICD-10-CM: M25.519  ICD-9-CM: 719.41  8/1/2013        Back pain, chronic ICD-10-CM: M54.9, G89.29  ICD-9-CM: 724.5, 338.29  8/1/2013        Knee pain, bilateral ICD-10-CM: M25.561, M25.562  ICD-9-CM: 719.46  8/1/2013        H/O intravenous drug use in remission ICD-10-CM: Z87.898  ICD-9-CM: 305.93  8/8/2012        Antisocial personality disorder ([facility] (Chronic) ICD-10-CM: F60.2  ICD-9-CM: 301.7  8/8/2012        Depression ICD-10-CM: F32.9  ICD-9-CM: 478  4/20/2011              Amparo Liriano III is being seen at [facility] for management of chronic HCV. The active problem list, all pertinent past medical history, medications, radiology and laboratory findings related to the liver disorder were reviewed with the patient.       The patient is a 38 y.o.  male who was found to have abnormalities in liver chemistries and subsequently tested positive for chronic HCV in 2000. Risk factors for acquiring HCV are IV drug use in 1990s - 2018. There was no history of acute icteric hepatitis at the time of these risk factors. CT scan of the liver was performed in 9/2019. The results of the imaging demonstrated a normal appearing liver. An assessment of liver fibrosis was performed with HCV Fibrosure. The score was 0.07 which correlates with no fibrosis. The patient completed an intended 8 weeks of hepatitis C treatment with Mavyret 11/03/2020-1/2021. The HCV RNA has remained undetectable since week 4 of treatment. He returns for testing to evaluate ongoing response to treatment and to determine if he is a sustained viral responder. Since the last office visit the patient developed severe abdominal pain and was evaluated in the ER at [facility] 4/2021. The workup was remarkable for diverticulosis, he has a follow-up with GI. The patient has the following symptoms which are thought to be due to the liver disease:  fatigue, pain in the right side over the liver, and joint pain. The patient is not currently experiencing the following symptoms of liver disease:  Problems concentrating, swelling of the abdomen, swelling of the lower xtremities, hematemesis, or hematochezia. The patient has limitations in functional activities which can be attributed to the liver disease and to other medical problems that are not related to the liver disease. ASSESSMENT AND PLAN:  Chronic HCV   Chronic HCV with no fibrosis. Severity of the liver disease was assessed by laboratory studies, Imaging, and HCV Fibrosure 9/2020. Have performed laboratory testing to monitor liver function and degree of liver injury. This included BMP, hepatic panel, and CBC with platelet count. Laboratory testing from 4/09/2021 reviewed in detail.  Follow-up testing ordered today. The liver transaminases, ALP, liver function and platelet count are normal.     Chronic HCV Treatment  The patient has HCV genotype 1A. The patient completed an intended 8 weeks of hepatitis C treatment with Stas Isaiah (glecaprevir and piprentasvir)11/03/2020-1/2021. The HCV RNA has remained undetectable since week 4 of treatment. An HCV RNA will be ordered today to determine if he is a sustained viral responder and cured of hepatitis C. He would like 1 additional check in 6 months. Abdominal Discomfort  CT was remarkable for diverticulosis. He is scheduled to see GI. Screening for Hepatocellular Carcinoma  HCC screening is not necessary if the patient has no evidence of cirrhosis. Treatment of other medical problems in patients with chronic liver disease  There are no contraindications for the patient to take most medications that are necessary for treatment of other medical issues. Counseling for alcohol in patients with chronic liver disease  The patient was counseled regarding alcohol consumption and the effect of alcohol on chronic liver disease. The patient does not consume any significant amount of alcohol. Substance Use  The patient was counseled regarding the risk of overdose and death from using opioids. Discussed the risk of becoming reinfected with HCV once they are cured if they resume IV drug use or inhaling drugs nasally. The patient has not used drugs since 2018. Vaccinations   Vaccination for viral hepatitis A is recommended since the patient has no serologic evidence of previous exposure or vaccination with immunity. Vaccination for viral hepatitis B is not needed. The patient has serologic evidence of prior exposure or vaccination with immunity. Routine vaccinations against other bacterial and viral agents can be performed as indicated. Annual flu vaccination should be administered if indicated.       ALLERGIES  Allergies   Allergen Reactions    Ibuprofen Nausea and Vomiting     GI upset; bleeding of the stomach per patient     Nsaids (Non-Steroidal Anti-Inflammatory Drug) Anaphylaxis    Tramadol Seizures    Tylenol [Acetaminophen] Unproven on Challenge     Patient states that he cannot take tylenol because \"its bad for my liver\". Pt reports Hep C and that MD had told him to not take tylenol in the past.        MEDICATIONS  Current Outpatient Medications   Medication Sig    dextroamphetamine-amphetamine (ADDERALL) 20 mg tablet Take 1 Tab by mouth two (2) times a day. Max Daily Amount: 40 mg.    Purelax 17 gram/dose powder Take 17 g by mouth daily.  Narcan 4 mg/actuation nasal spray ADMINISTER 2 SPRAYS AS NEEDED    sildenafil citrate (VIAGRA) 100 mg tablet 1/2 - 1 tab po one hour before sex on an empty stomach    DULoxetine (CYMBALTA) 60 mg capsule TAKE 1 CAPSULE BY MOUTH EVERY DAY    divalproex DR (DEPAKOTE) 500 mg tablet Take 1 Tab by mouth two (2) times a day. Indications: Patient only taking once a day    methadone HCl (METHADONE PO) Take 105 mg by mouth daily. No current facility-administered medications for this visit. SYSTEM REVIEW NOT RELATED TO LIVER DISEASE OR REVIEWED ABOVE:  Constitution systems: Negative for fever, chills, weight gain, weight loss. Eyes: Negative for visual changes. ENT: Negative for sore throat, painful swallowing. Respiratory: Negative for cough, hemoptysis, SOB. Cardiology: Negative for chest pain, palpitations. GI:  Negative for constipation or diarrhea. : Negative for urinary frequency, dysuria, hematuria, nocturia. Skin: Negative for rash. Hematology: Negative for easy bruising, blood clots. Musculo-skeletal: Negative for back pain, muscle pain, weakness. Neurologic: Negative for headaches, dizziness, vertigo, memory problems not related to HE. Psychology: Negative for anxiety, depression. FAMILY HISTORY:  The father  of CHF.     The mother Has/had the following chronic disease(s): anxiety, heart disease. There is no family history of liver disease. SOCIAL HISTORY:  The patient has never been . The patient has no children. The patient currently smokes 1/2 pack of tobacco daily. The patient has never consumed significant amounts of alcohol. The patient does not work. PHYSICAL EXAMINATION:  Visit Vitals  /80 (BP 1 Location: Right upper arm, BP Patient Position: Sitting, BP Cuff Size: Adult)   Pulse 80   Temp 97.9 °F (36.6 °C) (Temporal)   Resp 18   Ht 5' 10\" (1.778 m)   Wt 172 lb 6.4 oz (78.2 kg)   SpO2 97%   BMI 24.74 kg/m²     General: No acute distress. Eyes: Sclera anicteric. ENT: No oral lesions. Thyroid normal.  Nodes: No adenopathy. Skin: No spider angiomata. No jaundice. No palmar erythema. Respiratory: Lungs clear to auscultation. Cardiovascular: Regular heart rate. No murmurs. No JVD. Abdomen: Soft non-tender. Liver size normal to percussion/palpation. Spleen not palpable. No obvious ascites. Extremities: No edema. No muscle wasting. No gross arthritic changes. Neurologic: Alert and oriented. Cranial nerves grossly intact. No asterixis. LABORATORY STUDIES:  Liver [place] of [address] Units 5/19/2021 4/9/2021 2/15/2021   WBC 4.1 - 11.1 K/uL  5.0 5.9   ANC 1.4 - 7.0 x10E3/uL   3.1   HGB 12.1 - 17.0 g/dL  13.5 13.8    - 400 K/uL  165 204   AST 15 - 37 U/L 22 14 (L) 20   ALT 12 - 78 U/L 33 17 16   Alk Phos 45 - 117 U/L 75 62 69   Bili, Total 0.2 - 1.0 MG/DL 0.3 0.2 <0.2   Bili, Direct 0.0 - 0.2 MG/DL <0.1  0.07   Albumin 3.5 - 5.0 g/dL 3.9 3.6 4.2   BUN 6 - 20 MG/DL 17 17 18   Creat 0.70 - 1.30 MG/DL 0.61 (L) 0.85 0.78   Na 136 - 145 mmol/L 139 142 141   K 3.5 - 5.1 mmol/L 4.7 4.0 4.6   Cl 97 - 108 mmol/L 105 107 102   CO2 21 - 32 mmol/L 28 29 22   Glucose 65 - 100 mg/dL 61 (L) 116 (H) 71     Laboratory testing from 4/09/2021 reviewed in detail.  Additional testing included to evaluate progression or regression of disease. Laboratory testing results from today will be communicated by My Chart. SEROLOGIES:  Serologies Latest Ref Rng & Units 9/25/2020   Hep A Ab, Total Negative   Negative   Hep B Surface Ag Index <0.10   Hep B Surface Ag Interp Negative   Negative   Hep B Core Ab, Total Negative   Positive (A)   Hep B Surface Ab mIU/mL <3.10   Hep B Surface Ab Interp NONREACTIVE   NONREACTIVE     Serologies Latest Ref Rng & Units 7/30/2020   Hep C Genotype  1a   HCV RT-PCR, Quant IU/mL 3,440,000   HCV Log10 log10 IU/mL 6.537     Virology Latest Ref Rng & Units 2/15/2021   HCV RNA, PAIGE, QL Negative Negative     12/2020. HCV RNA undetectable. LIVER HISTOLOGY:  9/2020. HCV Fibrosure 0.07 which correlates with no fibrosis. ENDOSCOPIC PROCEDURES:  Not available or performed    RADIOLOGY:  9/2019. CT scan abdomen without IV contrast.  Normal appearing liver. No liver mass lesions. Normal spleen. No ascites. 10/2020. Ultrasound of liver. Normal echotexture with no liver mass or lesion. No ductal dilatation. OTHER TESTING:  Not available or performed    FOLLOW-UP:  All of the issues listed above in the Assessment and Plan were discussed with the patient. All questions were answered. The patient expressed a clear understanding of the above. [facility] in 6 months. April S.  [facility] of [address] [address] [place], [address] [facility].  [facility]

## 2021-05-20 RX ORDER — DIVALPROEX SODIUM 500 MG/1
TABLET, DELAYED RELEASE ORAL
Qty: 180 TABLET | Refills: 1 | Status: SHIPPED | OUTPATIENT
Start: 2021-05-20 | End: 2021-11-22

## 2021-05-22 LAB
HCV RNA SERPL QL NAA+PROBE: NEGATIVE
SERIAL MONITORING: NORMAL

## 2021-05-24 NOTE — PROGRESS NOTES
Letter sent to the patient regarding the blood work results. The liver numbers are normal and the hepatitis C remains undetectable.  He is now considered a sustained viral responder and cured of hepatitis C.

## 2021-06-07 DIAGNOSIS — F90.2 ATTENTION DEFICIT HYPERACTIVITY DISORDER (ADHD), COMBINED TYPE: ICD-10-CM

## 2021-06-07 RX ORDER — DEXTROAMPHETAMINE SACCHARATE, AMPHETAMINE ASPARTATE, DEXTROAMPHETAMINE SULFATE AND AMPHETAMINE SULFATE 5; 5; 5; 5 MG/1; MG/1; MG/1; MG/1
20 TABLET ORAL 2 TIMES DAILY
Qty: 60 TABLET | Refills: 0 | Status: SHIPPED | OUTPATIENT
Start: 2021-06-07 | End: 2021-07-06 | Stop reason: SDUPTHER

## 2021-07-06 DIAGNOSIS — F90.2 ATTENTION DEFICIT HYPERACTIVITY DISORDER (ADHD), COMBINED TYPE: ICD-10-CM

## 2021-07-06 RX ORDER — DEXTROAMPHETAMINE SACCHARATE, AMPHETAMINE ASPARTATE, DEXTROAMPHETAMINE SULFATE AND AMPHETAMINE SULFATE 5; 5; 5; 5 MG/1; MG/1; MG/1; MG/1
20 TABLET ORAL 2 TIMES DAILY
Qty: 60 TABLET | Refills: 0 | Status: SHIPPED | OUTPATIENT
Start: 2021-07-06 | End: 2021-08-04 | Stop reason: SDUPTHER

## 2021-07-06 NOTE — TELEPHONE ENCOUNTER
----- Message from April M [employer] sent at 7/6/2021  8:38 AM EDT -----  Regarding: Maria M Andersen MD/telephone  Medication Refill    Caller (if not patient):      Relationship of caller (if not patient):      Best contact number(s): [phone]      Name of medication and dosage if known:  Adderall 20 mg        Is patient out of this medication (yes/no): Yes       Pharmacy name: [facility]     Pharmacy listed in chart? (yes/no): Yes   Pharmacy phone number:  [phone]        Details to clarify the request:       April Tal[employer]house
<<----- Click to add NO pertinent Family History

## 2021-07-23 ENCOUNTER — DOCUMENTATION ONLY (OUTPATIENT)
Dept: FAMILY MEDICINE CLINIC | Age: 41
End: 2021-07-23

## 2021-07-26 RX ORDER — DULOXETIN HYDROCHLORIDE 60 MG/1
CAPSULE, DELAYED RELEASE ORAL
Qty: 30 CAPSULE | Refills: 5 | Status: SHIPPED | OUTPATIENT
Start: 2021-07-26 | End: 2021-09-20 | Stop reason: SDUPTHER

## 2021-07-29 ENCOUNTER — HOSPITAL ENCOUNTER (OUTPATIENT)
Age: 41
Setting detail: OUTPATIENT SURGERY
Discharge: HOME | End: 2021-07-29
Attending: INTERNAL MEDICINE | Admitting: INTERNAL MEDICINE
Payer: MEDICAID

## 2021-07-29 ENCOUNTER — ANESTHESIA (OUTPATIENT)
Dept: ENDOSCOPY | Age: 41
End: 2021-07-29
Payer: MEDICAID

## 2021-07-29 ENCOUNTER — ANESTHESIA EVENT (OUTPATIENT)
Dept: ENDOSCOPY | Age: 41
End: 2021-07-29
Payer: MEDICAID

## 2021-07-29 VITALS
OXYGEN SATURATION: 94 % | RESPIRATION RATE: 15 BRPM | DIASTOLIC BLOOD PRESSURE: 93 MMHG | BODY MASS INDEX: 25.73 KG/M2 | WEIGHT: 179.7 LBS | SYSTOLIC BLOOD PRESSURE: 132 MMHG | TEMPERATURE: 97.7 F | HEART RATE: 70 BPM | HEIGHT: 70 IN

## 2021-07-29 PROCEDURE — 74011250637 HC RX REV CODE- 250/637: Performed by: INTERNAL MEDICINE

## 2021-07-29 PROCEDURE — 76040000019: Performed by: INTERNAL MEDICINE

## 2021-07-29 PROCEDURE — 74011000250 HC RX REV CODE- 250: Performed by: ANESTHESIOLOGY

## 2021-07-29 PROCEDURE — 76060000031 HC ANESTHESIA FIRST 0.5 HR: Performed by: INTERNAL MEDICINE

## 2021-07-29 PROCEDURE — 74011250636 HC RX REV CODE- 250/636: Performed by: ANESTHESIOLOGY

## 2021-07-29 PROCEDURE — 74011250636 HC RX REV CODE- 250/636: Performed by: INTERNAL MEDICINE

## 2021-07-29 PROCEDURE — 2709999900 HC NON-CHARGEABLE SUPPLY: Performed by: INTERNAL MEDICINE

## 2021-07-29 RX ORDER — FLUMAZENIL 0.1 MG/ML
0.2 INJECTION INTRAVENOUS
Status: DISCONTINUED | OUTPATIENT
Start: 2021-07-29 | End: 2021-07-29 | Stop reason: HOSPADM

## 2021-07-29 RX ORDER — NALOXONE HYDROCHLORIDE 0.4 MG/ML
0.4 INJECTION, SOLUTION INTRAMUSCULAR; INTRAVENOUS; SUBCUTANEOUS
Status: DISCONTINUED | OUTPATIENT
Start: 2021-07-29 | End: 2021-07-29 | Stop reason: HOSPADM

## 2021-07-29 RX ORDER — PROPOFOL 10 MG/ML
INJECTION, EMULSION INTRAVENOUS AS NEEDED
Status: DISCONTINUED | OUTPATIENT
Start: 2021-07-29 | End: 2021-07-29 | Stop reason: HOSPADM

## 2021-07-29 RX ORDER — SODIUM CHLORIDE 0.9 % (FLUSH) 0.9 %
5-40 SYRINGE (ML) INJECTION EVERY 8 HOURS
Status: DISCONTINUED | OUTPATIENT
Start: 2021-07-29 | End: 2021-07-29 | Stop reason: HOSPADM

## 2021-07-29 RX ORDER — EPINEPHRINE 0.1 MG/ML
1 INJECTION INTRACARDIAC; INTRAVENOUS
Status: DISCONTINUED | OUTPATIENT
Start: 2021-07-29 | End: 2021-07-29 | Stop reason: HOSPADM

## 2021-07-29 RX ORDER — LIDOCAINE HYDROCHLORIDE 20 MG/ML
INJECTION, SOLUTION EPIDURAL; INFILTRATION; INTRACAUDAL; PERINEURAL AS NEEDED
Status: DISCONTINUED | OUTPATIENT
Start: 2021-07-29 | End: 2021-07-29 | Stop reason: HOSPADM

## 2021-07-29 RX ORDER — SODIUM CHLORIDE 9 MG/ML
75 INJECTION, SOLUTION INTRAVENOUS CONTINUOUS
Status: DISCONTINUED | OUTPATIENT
Start: 2021-07-29 | End: 2021-07-29 | Stop reason: HOSPADM

## 2021-07-29 RX ORDER — FENTANYL CITRATE 50 UG/ML
25 INJECTION, SOLUTION INTRAMUSCULAR; INTRAVENOUS
Status: DISCONTINUED | OUTPATIENT
Start: 2021-07-29 | End: 2021-07-29 | Stop reason: HOSPADM

## 2021-07-29 RX ORDER — ATROPINE SULFATE 0.1 MG/ML
0.5 INJECTION INTRAVENOUS
Status: DISCONTINUED | OUTPATIENT
Start: 2021-07-29 | End: 2021-07-29 | Stop reason: HOSPADM

## 2021-07-29 RX ORDER — MIDAZOLAM HYDROCHLORIDE 1 MG/ML
.25-5 INJECTION, SOLUTION INTRAMUSCULAR; INTRAVENOUS
Status: DISCONTINUED | OUTPATIENT
Start: 2021-07-29 | End: 2021-07-29 | Stop reason: HOSPADM

## 2021-07-29 RX ORDER — DEXTROMETHORPHAN/PSEUDOEPHED 2.5-7.5/.8
1.2 DROPS ORAL
Status: DISCONTINUED | OUTPATIENT
Start: 2021-07-29 | End: 2021-07-29 | Stop reason: HOSPADM

## 2021-07-29 RX ORDER — SODIUM CHLORIDE 0.9 % (FLUSH) 0.9 %
5-40 SYRINGE (ML) INJECTION AS NEEDED
Status: DISCONTINUED | OUTPATIENT
Start: 2021-07-29 | End: 2021-07-29 | Stop reason: HOSPADM

## 2021-07-29 RX ADMIN — LIDOCAINE HYDROCHLORIDE 40 MG: 20 INJECTION, SOLUTION EPIDURAL; INFILTRATION; INTRACAUDAL; PERINEURAL at 11:23

## 2021-07-29 RX ADMIN — PROPOFOL 300 MG: 10 INJECTION, EMULSION INTRAVENOUS at 11:37

## 2021-07-29 RX ADMIN — SIMETHICONE 80 MG: 20 SUSPENSION/ DROPS ORAL at 11:31

## 2021-07-29 RX ADMIN — SODIUM CHLORIDE 75 ML/HR: 9 INJECTION, SOLUTION INTRAVENOUS at 10:50

## 2021-07-29 NOTE — ANESTHESIA POSTPROCEDURE EVALUATION
Procedure(s):  COLONOSCOPY.    total IV anesthesia    Anesthesia Post Evaluation        Patient location during evaluation: PACU  Note status: Adequate. Level of consciousness: responsive to verbal stimuli and sleepy but conscious  Pain management: satisfactory to patient  Airway patency: patent  Anesthetic complications: no  Cardiovascular status: acceptable  Respiratory status: acceptable  Hydration status: acceptable  Comments: +Post-Anesthesia Evaluation and Assessment    Patient: Gege Saeed MRN: [id number]  SSN: [id number]   YOB: 1980  Age: 42 y.o. Sex: male      Cardiovascular Function/Vital Signs    BP (!) 132/93   Pulse 70   Temp 36.5 °C (97.7 °F)   Resp 15   Ht 5' 10\" (1.778 m)   Wt 81.5 kg (179 lb 11.2 oz)   SpO2 94%   BMI 25.78 kg/m²     Patient is status post Procedure(s):  COLONOSCOPY. Nausea/Vomiting: Controlled. Postoperative hydration reviewed and adequate. Pain:  Pain Scale 1: Numeric (0 - 10) (07/29/21 1203)  Pain Intensity 1: 0 (07/29/21 1203)   Managed. Neurological Status: At baseline. Mental Status and Level of Consciousness: Arousable. Pulmonary Status:   O2 Device: None (Room air) (07/29/21 1203)   Adequate oxygenation and airway patent. Complications related to anesthesia: None    Post-anesthesia assessment completed. No concerns. Signed By: Michael Byrne DO    7/29/2021  Post anesthesia nausea and vomiting:  controlled      INITIAL Post-op Vital signs:   Vitals Value Taken Time   /93 07/29/21 1203   Temp 36.5 °C (97.7 °F) 07/29/21 1148   Pulse 71 07/29/21 1204   Resp 9 07/29/21 1204   SpO2 96 % 07/29/21 1204   Vitals shown include unvalidated device data.

## 2021-07-29 NOTE — ROUTINE PROCESS
Felicia George III  1980  [id number]    Situation:  Verbal report received from: [facility]  Procedure: Procedure(s):  COLONOSCOPY    Background:    Preoperative diagnosis: CHANGE IN BOWEL HABITS  Postoperative diagnosis: Hemorrhoids, change in bowel habits, constipation    :  Dr. Chata Zayas  Assistant(s): Endoscopy RN-1: Bela Ochoa, YOJANA; Kaci Breen RN    Specimens: * No specimens in log *  H. Pylori  no    Assessment:  Intra-procedure medications     Anesthesia gave intra-procedure sedation and medications, see anesthesia flow sheet yes    Intravenous fluids: NS@ KVO     Vital signs stable     Abdominal assessment: round and soft     Recommendation:  Discharge patient per MD order. Family or Friend   Permission to share finding with family or friend yes    Endoscopy discharge instructions have been reviewed and given to patient. The patient verbalized understanding and acceptance of instructions. Dr. Chata Zayas discussed with parent procedure findings and next steps. Patient medication teaching was done for miralax, dulcolax. [address] were included with discharge instructions. Opportunities for questions given and clarification was provided. patient verbalized understanding and acceptance. Medication side effects reviewed.

## 2021-07-29 NOTE — PROCEDURES
NAME:  Kathern Gottron III   :   1980   MRN:   [id number]     Date/Time:  2021 11:44 AM    Colonoscopy Operative Report    Procedure Type:   Colonoscopy --diagnostic     Indications:     Change in bowel habits consistent with constipation  Pre-operative Diagnosis: see indication above  Post-operative Diagnosis:  See findings below  :  Karolina Wiseman MD  Referring Provider: Sj Lucio MD    Exam:  Airway: clear, no airway problems anticipated  Heart: RRR, without gallops or rubs  Lungs: clear bilaterally without wheezes, crackles, or rhonchi  Abdomen: soft, nontender, nondistended, bowel sounds present  Mental Status: awake, alert and oriented to person, place and time    Sedation:  MAC anesthesia Propofol 300mg IV  Procedure Details:  After informed consent was obtained with all risks and benefits of procedure explained and preoperative exam completed, the patient was taken to the endoscopy suite and placed in the left lateral decubitus position. Upon sequential sedation as per above, a digital rectal exam was performed demonstrating internal hemorrhoids. The Olympus videocolonoscope  was inserted in the rectum and carefully advanced to the cecum, which was identified by the ileocecal valve and appendiceal orifice. The distal terminal ileum was evalauted. The quality of preparation was adequate. The colonoscope was slowly withdrawn with careful evaluation between folds. Retroflexion in the rectum was completed demonstrating internal hemorrhoids. Findings:     -Normal terminal ileum  -No colon masses, polyps, or inflammation noted  -Small grade 1 internal hemorrhoids  Constipation symptoms and pain symptoms are likely related to adverse effect of your pain medication    Specimen Removed:  None. Complications: None. EBL:  None.     Impression:    -Normal terminal ileum  -No colon masses, polyps, or inflammation noted  -Small grade 1 internal hemorrhoids  Constipation symptoms and pain symptoms are likely related to adverse effect of your pain medication    Recommendations: --For colon cancer screening in this average-risk patient, colonoscopy may be repeated in 10 years. High fiber diet. Resume normal medication(s). -Use daily Miralax and Dulcolax     Discharge Disposition:  Home in the company of a  when able to ambulate.     Dav Lee MD

## 2021-07-29 NOTE — ANESTHESIA PREPROCEDURE EVALUATION
Anesthetic History     PONV          Review of Systems / Medical History  Patient summary reviewed, nursing notes reviewed and pertinent labs reviewed    Pulmonary  Within defined limits                 Neuro/Psych         Psychiatric history     Cardiovascular  Within defined limits                Exercise tolerance: >4 METS     GI/Hepatic/Renal       Hepatitis: type C    PUD and liver disease     Endo/Other        Arthritis     Other Findings   Comments: Colitis   Denies any drug abuse x 4 years.   On Methadone program.           Physical Exam    Airway  Mallampati: I  TM Distance: 4 - 6 cm  Neck ROM: normal range of motion   Mouth opening: Normal     Cardiovascular  Regular rate and rhythm,  S1 and S2 normal,  no murmur, click, rub, or gallop             Dental    Dentition: Poor dentition     Pulmonary  Breath sounds clear to auscultation               Abdominal  GI exam deferred       Other Findings            Anesthetic Plan    ASA: 3  Anesthesia type: general and total IV anesthesia          Induction: Intravenous  Anesthetic plan and risks discussed with: Patient      Propofol MAC

## 2021-07-29 NOTE — PERIOP NOTES
See anesthesia note for medications given during procedure. Received report from anesthesia staff on vital signs and status of patient. Endoscope was pre-cleaned at the bedside immediately following procedure by Emma DIAL.

## 2021-07-29 NOTE — DISCHARGE INSTRUCTIONS
Judy Wasserman III  [id number]  1980    COLON DISCHARGE INSTRUCTIONS  Discomfort:  Redness at IV site- apply warm compress to area; if redness or soreness persist- contact your physician  There may be a slight amount of blood passed from the rectum  Gaseous discomfort- walking, belching will help relieve any discomfort  You may not operate a vehicle for 12 hours  You may not engage in an occupation involving machinery or appliances for rest of today  You may not drink alcoholic beverages for at least 12 hours  Avoid making any critical decisions for at least 24 hour  DIET:   {Gi dietary recommendations:72554}   - however -  remember your colon is empty and a heavy meal will produce gas. Avoid these foods:  vegetables, fried / greasy foods, carbonated drinks for today  MEDICATION:  {Medication reconciliation information is now added to the patient's AVS automatically when it is printed. There is no need to use this SmartLink in discharge instructions. Highlight this text and delete it to clear this message}       ACTIVITY:  You may not resume your normal daily activities until tomorrow AM; it is recommended that you spend the remainder of the day resting -  avoid any strenuous activity. CALL M.D. ANY SIGN OF:   Increasing pain, nausea, vomiting  Abdominal distension (swelling)  New increased bleeding (oral or rectal)  Fever (chills)  Pain in chest area  Bloody discharge from nose or mouth  Shortness of breath    IMPRESSION:  -Normal terminal ileum  -No colon masses, polyps, or inflammation noted  -Small grade 1 internal hemorrhoids  Constipation symptoms and abdominal pain symptoms are likely related to adverse effect of your pain medication    Follow-up Instructions:  -Call Dr. Lisandro Disla if questions arise regarding your procedure  -Telephone # [phone]  -For colon cancer screening in this average-risk patient, colonoscopy may be repeated in 10 years. High fiber diet. Resume normal medication(s).       -Use daily Miralax and Dulcolax     Servando Fox MD    Patient Education   Polyethylene Glycol 3350 (By mouth)   Polyethylene Glycol (qui-qi-ABK-i-mariam GLYE-kol)  Treats occasional constipation. Brand Name(s): Equate ClearLax, Stephanie Neighbours, [facility] Clear Lax, Healthylax Polyethylene Glycol 3350, Leader ClearLax, MiraLAX, , Purelax, [employer] Laxative, Sunmark Clearmax, TopCare ClearLax, [facility] Polyethylene Glycol 3350   There may be other brand names for this medicine. When This Medicine Should Not Be Used: You should not use this medicine if you have had an allergic reaction to polyethylene glycol, or if you have signs of a bowel obstruction (nausea, vomiting, stomach pain or bloating). How to Use This Medicine:   Powder, Liquid, Packet  · Your doctor will tell you how much of this medicine to take and how often. Do not take more medicine or take it more often than your doctor tells you to. This medicine is not for long-term use. · Always dissolve the powder in a full glass (8 ounces) of water, juice, soda, coffee, or tea before swallowing it. If you need to measure your medicine, use a marked measuring cup or spoon. · Measure the oral liquid medicine with a marked measuring spoon, oral syringe, or medicine cup. · It may take 2 days or longer for this medicine to help you have a bowel movement. If a dose is missed:   · If you miss a dose or forget to take your medicine, take it as soon as you can. If it is almost time for your next dose, wait until then to take the medicine and skip the missed dose. · Do not use extra medicine to make up for a missed dose. How to Store and Dispose of This Medicine:   · Store the medicine at room temperature in a closed container, away from heat, moisture, and direct light. · Keep all medicine out of the reach of children and never share your medicine with anyone.   Drugs and Foods to Avoid:      Ask your doctor or pharmacist before using any other medicine, including over-the-counter medicines, vitamins, and herbal products. Warnings While Using This Medicine:   · Make sure your doctor knows if you are pregnant or breastfeeding. · Do not use this medicine longer than 2 weeks unless your doctor has told you to. Possible Side Effects While Using This Medicine:   Call your doctor right away if you notice any of these side effects:  · Allergic reaction: Itching or hives, swelling in face or hands, swelling or tingling in the mouth or throat, tightness in chest, trouble breathing. · Severe stomach pain, bloating, vomiting, or diarrhea. If you notice these less serious side effects, talk with your doctor:   · Mild cramps, bloating, diarrhea, or gas. If you notice other side effects that you think are caused by this medicine, tell your doctor. Call your doctor for medical advice about side effects. You may report side effects to FDA at 0-104-FDA-2601  © 2017 [address] Information is for End User's use only and may not be sold, redistributed or otherwise used for commercial purposes. The above information is an  only. It is not intended as medical advice for individual conditions or treatments. Talk to your doctor, nurse or pharmacist before following any medical regimen to see if it is safe and effective for you. Patient Education   Bisacodyl (By mouth)   Bisacodyl (bis-AK-oh-dil)  Treats constipation. Brand Name(s): Alophen, Correctol, Dulcolax, Dulcolax Bowel Cleansing Kit, Dulcolax Bowel Prep Kit, Fleet Bisacodyl, Gentle Laxative, [facility] Laxative, Good Sense Bisacodyl Laxative, Good Sense Women's Laxative, Bharat Prep, Medi-Lax, , TopCare Laxative, TopCare Woman's Laxative   There may be other brand names for this medicine. When This Medicine Should Not Be Used: You should not use this medicine if you have had an allergic reaction to bisacodyl.    How to Use This Medicine:   Tablet, Coated Tablet  · Your doctor will tell you how much medicine to use. Do not use more than directed. · Swallow the tablet whole. Do not chew or crush it. How to Store and Dispose of This Medicine:   · Store the medicine in a closed container at room temperature, away from heat, moisture, and direct light. · Ask your pharmacist, doctor, or health caregiver about the best way to dispose of any outdated medicine or medicine no longer needed. · Keep all medicine out of the reach of children. Never share your medicine with anyone. Drugs and Foods to Avoid:   Ask your doctor or pharmacist before using any other medicine, including over-the-counter medicines, vitamins, and herbal products. · Do not use this medicine within 1 hour after drinking milk or taking an antacid. Warnings While Using This Medicine:   · Make sure your doctor knows if you are pregnant or breast feeding. · Before using this medicine, make sure your doctor knows if you have stomach pain, nausea, vomiting, or a sudden change in bowel habits. · Make sure your doctor knows if you cannot swallow the tablet without chewing. · Tell your doctor if your constipation does not improve after using this medicine for 1 week. · You should not give this medicine to a child under 10years of age unless a doctor tells you to. · You may not see results for several hours after you have taken this medicine. · Stop using this medicine and call your doctor right away if you do not have a bowel movement in 12 hours, or if you have rectal bleeding. Possible Side Effects While Using This Medicine:   Call your doctor right away if you notice any of these side effects:  · Allergic reaction: Itching or hives, swelling in your face or hands, swelling or tingling in your mouth or throat, chest tightness, trouble breathing  · Lightheadedness or fainting. If you notice these less serious side effects, talk with your doctor:   · Mild stomach cramps or discomfort.   If you notice other side effects that you think are caused by this medicine, tell your doctor. Call your doctor for medical advice about side effects. You may report side effects to FDA at 3-754-GOI-5017  © 2017 [facility] Information is for End User's use only and may not be sold, redistributed or otherwise used for commercial purposes. The above information is an  only. It is not intended as medical advice for individual conditions or treatments. Talk to your doctor, nurse or pharmacist before following any medical regimen to see if it is safe and effective for you.

## 2021-07-29 NOTE — H&P
Gastroenterology Outpatient History and Physical    Patient: Martha Warner III    Physician: Maria Teresa Castorena MD    Chief Complaint: change of bowel habit c/w constipation  History of Present Illness: 36yo M with change of bowel habit c/w constipation. Last colonoscop     History:  Past Medical History:   Diagnosis Date    Arthritis     Drug abuse ([address]     Hepatitis C virus     \"healed\"    Ill-defined condition     Hep C    Infectious disease     Hep C    Kidney stones     Other ill-defined conditions(799.89)     DDD    Psychiatric disorder     prescription drug abuse    PUD (peptic ulcer disease)       Past Surgical History:   Procedure Laterality Date    COLONOSCOPY N/A 6/29/2016    COL[facility] at [facility] ENDOSCOPY    HX LITHOTRIPSY      HX UROLOGICAL      lithotripsy    HX WISDOM TEETH EXTRACTION        Social History     Socioeconomic History    Marital status: SINGLE     Spouse name: Not on file    Number of children: Not on file    Years of education: Not on file    Highest education level: Not on file   Tobacco Use    Smoking status: Current Every Day Smoker     Packs/day: 0.25     Years: 40.00     Pack years: 10.00     Types: Cigarettes    Smokeless tobacco: Never Used    Tobacco comment: has smoked since \"age 1\"   Vaping Use    Vaping Use: Never used   Substance and Sexual Activity    Alcohol use: Not Currently    Drug use: Yes     Types: Marijuana     Comment: daily   Social History Narrative    NOt able to work at present due to pains. Social Determinants of Health     Financial Resource Strain:     Difficulty of Paying Living Expenses:    Food Insecurity:     Worried About Running Out of Food in the Last Year:     [address] in the Last Year:    Transportation Needs:     Lack of Transportation (Medical):      Lack of Transportation (Non-Medical):    Physical Activity:     Days of Exercise per Week:     Minutes of Exercise per Session: Stress:     Feeling of Stress :    Social Connections:     Frequency of Communication with Friends and Family:     Frequency of Social Gatherings with Friends and Family:     Attends [employer] Services:     Active Member of Clubs or Organizations:     Attends Club or Organization Meetings:     Marital Status:       Family History   Problem Relation Age of Onset    Heart Disease Mother     Hypertension Mother     Stroke Mother     Heart Disease Father     Hypertension Father     Other Sister         kidney stones    Hypertension Sister     Stroke Sister       Patient Active Problem List   Diagnosis Code    Depression F32.9    H/O intravenous drug use in remission Z87.898    Antisocial personality disorder ([facility] F60.2    Chronic viral hepatitis C ([facility] B18.2    Shoulder pain M25.519    Back pain, chronic M54.9, G89.29    Knee pain, bilateral M25.561, M25.562    Peptic ulcer disease K27.9    ADD (attention deficit disorder) F98.8       Allergies: Allergies   Allergen Reactions    Nsaids (Non-Steroidal Anti-Inflammatory Drug) Nausea and Vomiting     \"start throwing up blood\"    Ibuprofen Nausea and Vomiting     GI upset; bleeding of the stomach per patient     Tramadol Seizures    Tylenol [Acetaminophen] Unproven on [employer]     Patient states that he cannot take tylenol because \"its bad for my liver\". Pt reports Hep C and that MD had told him to not take tylenol in the past.      Medications:   Prior to Admission medications    Medication Sig Start Date End Date Taking? Authorizing Provider   dextroamphetamine-amphetamine (ADDERALL) 20 mg tablet Take 1 Tablet by mouth two (2) times a day. Max Daily Amount: 40 mg. 7/6/21  Yes Hawa Peterson MD   Purelax 17 gram/dose powder Take 17 g by mouth daily.  4/12/21  Yes Provider, Historical   DULoxetine (CYMBALTA) 60 mg capsule TAKE 1 CAPSULE BY MOUTH EVERY DAY 7/26/21   Hawa Peterson MD   divalproex  (DEPAKOTE) 500 mg tablet TAKE 1 TAB BY MOUTH TWO (2) TIMES A DAY  Patient taking differently: Take 500 mg by mouth daily. 5/20/21   Alex Wharton MD   Narcan 4 mg/actuation nasal spray ADMINISTER 2 SPRAYS AS NEEDED  Patient not taking: Reported on 7/28/2021 3/9/21   Provider, Historical   sildenafil citrate (VIAGRA) 100 mg tablet 1/2 - 1 tab po one hour before sex on an empty stomach  Patient not taking: Reported on 7/28/2021 1/25/21   Alex Wharton MD   methadone HCl (METHADONE PO) Take 105 mg by mouth daily. Provider, Historical     Physical Exam:   Vital Signs: Blood pressure (!) 143/95, pulse 83, temperature 97.8 °F (36.6 °C), resp. rate 10, height 5' 10\" (1.778 m), weight 81.5 kg (179 lb 11.2 oz), SpO2 97 %.   General: well developed, well nourished   HEENT: unremarkable   Heart: regular rhythm no mumur    Lungs: clear   Abdominal:  benign   Neurological: unremarkable   Extremities: no edema     Findings/Diagnosis: change of bowel habit c/w constipation  Plan of Care/Planned Procedure: colonoscopy with conscious/deep sedation    Signed:  Yara Trujillo MD 7/29/2021

## 2021-08-04 DIAGNOSIS — F90.2 ATTENTION DEFICIT HYPERACTIVITY DISORDER (ADHD), COMBINED TYPE: ICD-10-CM

## 2021-08-04 RX ORDER — DEXTROAMPHETAMINE SACCHARATE, AMPHETAMINE ASPARTATE, DEXTROAMPHETAMINE SULFATE AND AMPHETAMINE SULFATE 5; 5; 5; 5 MG/1; MG/1; MG/1; MG/1
20 TABLET ORAL 2 TIMES DAILY
Qty: 60 TABLET | Refills: 0 | Status: SHIPPED | OUTPATIENT
Start: 2021-08-04 | End: 2021-09-09 | Stop reason: SDUPTHER

## 2021-08-04 NOTE — TELEPHONE ENCOUNTER
Patient needs his:dextroamphetamine-amphetamine (ADDERALL) 20 mg tablet. Please call @[phone].  (Mother's number)

## 2021-09-07 ENCOUNTER — TELEPHONE (OUTPATIENT)
Dept: FAMILY MEDICINE CLINIC | Age: 41
End: 2021-09-07

## 2021-09-07 NOTE — TELEPHONE ENCOUNTER
Patient needs his:dextroamphetamine-amphetamine (ADDERALL) 20 mg tablet. He is completely out. Please call @[phone].

## 2021-09-08 ENCOUNTER — TELEPHONE (OUTPATIENT)
Dept: FAMILY MEDICINE CLINIC | Age: 41
End: 2021-09-08

## 2021-09-08 NOTE — TELEPHONE ENCOUNTER
----- Message from Tran Henderson sent at 9/8/2021  1:29 PM EDT -----  Regarding: Dr. Ca Saavedra (if not patient):n/a      Relationship of caller (if not patient):n/a      Best contact number(s):[phone]      Name of medication and dosage if known: Adderall 20mg      Is patient out of this medication (yes/no): Yes, 3 days ago      Pharmacy name: [facility] listed in chart? (yes/no):Yes  Pharmacy phone number:n/a      Details to clarify the request: PT wants to know the status of refill.        Tran Henderson

## 2021-09-08 NOTE — TELEPHONE ENCOUNTER
----- Message from Mariaelena Akbar sent at 9/8/2021  9:56 AM EDT -----  Regarding: /Refill      Medication Refill    Caller (if not patient): Mikhail [place]      Relationship of caller (if not patient): Mom      Best contact number(s): [phone]      Name of medication and dosage if known: dextroamphetamine-amphetamine (ADDERALL) 20 mg tablet      Is patient out of this medication (yes/no): Yes      Pharmacy name: [address] [facility] listed in chart? (yes/no):  Yes  Pharmacy phone number: [phone]      Details to clarify the request: Mom requested script 9/2. Advised of turnaround time.        Mariaelena Akbar

## 2021-09-09 ENCOUNTER — TELEPHONE (OUTPATIENT)
Dept: FAMILY MEDICINE CLINIC | Age: 41
End: 2021-09-09

## 2021-09-09 NOTE — TELEPHONE ENCOUNTER
[phone] Patient would like a return call regarding refills on Adderall. He lost his job because is out of meds. He stated that he left several messages.

## 2021-09-09 NOTE — TELEPHONE ENCOUNTER
----- Message from Cleopatra Kanner sent at 9/9/2021  8:49 AM EDT -----  Regarding: Dr. Patricia Bey (if not patient):Josseline Berry      Relationship of caller (if not patient): Son      Best contact number(s):[phone]      Name of medication and dosage if known: Adderall 20mg twice a day      Is patient out of this medication (yes/no):Yes, having withdrawals      Pharmacy name: [facility] [address] listed in chart? (yes/no): yes  Pharmacy phone number:      Details to clarify the request:      Cleopatra Kanner

## 2021-09-20 RX ORDER — DULOXETIN HYDROCHLORIDE 60 MG/1
60 CAPSULE, DELAYED RELEASE ORAL DAILY
Qty: 90 CAPSULE | Refills: 2 | Status: SHIPPED | OUTPATIENT
Start: 2021-09-20 | End: 2021-11-04 | Stop reason: ALTCHOICE

## 2021-09-20 NOTE — TELEPHONE ENCOUNTER
[facility] sent a 80 day fax request for patient's Cymbalta 60mg    Last visit: 4/16/21  Next visit: not scheduled  Previous refill 7/26/21(30+1 cap)    Requested Prescriptions     Pending Prescriptions Disp Refills    DULoxetine (CYMBALTA) 60 mg capsule 90 Capsule 2     Sig: Take 1 Capsule by mouth daily.

## 2021-10-07 DIAGNOSIS — F90.2 ATTENTION DEFICIT HYPERACTIVITY DISORDER (ADHD), COMBINED TYPE: ICD-10-CM

## 2021-10-07 NOTE — TELEPHONE ENCOUNTER
Patient mother is requesting a RX refill     dextroamphetamine-amphetamine (ADDERALL) 20 mg tablet  Patient is completely out she can be reached @ [phone]

## 2021-10-09 RX ORDER — DEXTROAMPHETAMINE SACCHARATE, AMPHETAMINE ASPARTATE, DEXTROAMPHETAMINE SULFATE AND AMPHETAMINE SULFATE 5; 5; 5; 5 MG/1; MG/1; MG/1; MG/1
20 TABLET ORAL 2 TIMES DAILY
Qty: 60 TABLET | Refills: 0 | Status: SHIPPED | OUTPATIENT
Start: 2021-10-09 | End: 2021-11-04 | Stop reason: SDUPTHER

## 2021-11-04 ENCOUNTER — OFFICE VISIT (OUTPATIENT)
Dept: FAMILY MEDICINE CLINIC | Age: 41
End: 2021-11-04
Payer: MEDICAID

## 2021-11-04 VITALS
DIASTOLIC BLOOD PRESSURE: 91 MMHG | BODY MASS INDEX: 26.69 KG/M2 | RESPIRATION RATE: 16 BRPM | OXYGEN SATURATION: 96 % | HEART RATE: 91 BPM | WEIGHT: 186 LBS | SYSTOLIC BLOOD PRESSURE: 146 MMHG

## 2021-11-04 DIAGNOSIS — F31.75 BIPOLAR DISORDER, IN PARTIAL REMISSION, MOST RECENT EPISODE DEPRESSED (HCC): Primary | ICD-10-CM

## 2021-11-04 DIAGNOSIS — F90.2 ATTENTION DEFICIT HYPERACTIVITY DISORDER (ADHD), COMBINED TYPE: ICD-10-CM

## 2021-11-04 PROCEDURE — 99213 OFFICE O/P EST LOW 20 MIN: CPT | Performed by: FAMILY MEDICINE

## 2021-11-04 RX ORDER — DEXTROAMPHETAMINE SACCHARATE, AMPHETAMINE ASPARTATE, DEXTROAMPHETAMINE SULFATE AND AMPHETAMINE SULFATE 5; 5; 5; 5 MG/1; MG/1; MG/1; MG/1
20 TABLET ORAL 2 TIMES DAILY
Qty: 60 TABLET | Refills: 0 | Status: SHIPPED | OUTPATIENT
Start: 2021-11-04 | End: 2021-12-08 | Stop reason: SDUPTHER

## 2021-11-04 NOTE — PROGRESS NOTES
Chief Complaint   Patient presents with    Follow Up Chronic Condition     1. Have you been to the ER, urgent care clinic since your last visit? Hospitalized since your last visit? No    2. Have you seen or consulted any other health care providers outside of the [address] [place] [address] since your last visit? Include any pap smears or colon screening.  Yes clinic

## 2021-11-04 NOTE — PROGRESS NOTES
HISTORY OF PRESENT ILLNESS  Zena Washburn III is a 42 y.o. male. HPI Has been shaky, nervous, irritable, yells at mother, for the last month. Wants to stop his methadone. Not sleeping well at night. Father was Zena Washburn, who was in a wheelchair. tried trazodone, seroquel, wellbutrin- couldn't take it. Pt thinks that the duloxetine makes him worse- gets some suicidal thoughts. Currently on Methadone 105 mg daily    ROS    Physical Exam  Vitals and nursing note reviewed. Constitutional:       Appearance: He is well-developed. HENT:      Right Ear: External ear normal.      Left Ear: External ear normal.   Neck:      Thyroid: No thyromegaly. Cardiovascular:      Rate and Rhythm: Normal rate and regular rhythm. Heart sounds: Normal heart sounds. Pulmonary:      Effort: Pulmonary effort is normal. No respiratory distress. Breath sounds: Normal breath sounds. No wheezing. Abdominal:      General: Bowel sounds are normal. There is no distension. Palpations: Abdomen is soft. There is no mass. Tenderness: There is no abdominal tenderness. There is no guarding. Musculoskeletal:         General: Normal range of motion. Lymphadenopathy:      Cervical: No cervical adenopathy. ASSESSMENT and PLAN  Orders Placed This Encounter    REFERRAL TO PSYCHIATRY    dextroamphetamine-amphetamine (ADDERALL) 20 mg tablet     Diagnoses and all orders for this visit:    1. Bipolar disorder, in partial remission, most recent episode depressed ([facility] [address]  -     REFERRAL TO PSYCHIATRY    2. Attention deficit hyperactivity disorder (ADHD), combined type  -     dextroamphetamine-amphetamine (ADDERALL) 20 mg tablet; Take 1 Tablet by mouth two (2) times a day. Max Daily Amount: 40 mg. Will dc duloxetine. Refer psychiatry. Pt here to start xanax, I dont feel comfortable prescribing this with him being on high dose methadone.

## 2021-11-22 RX ORDER — DIVALPROEX SODIUM 500 MG/1
TABLET, DELAYED RELEASE ORAL
Qty: 180 TABLET | Refills: 1 | Status: SHIPPED | OUTPATIENT
Start: 2021-11-22 | End: 2022-10-03

## 2021-12-06 ENCOUNTER — TELEPHONE (OUTPATIENT)
Dept: FAMILY MEDICINE CLINIC | Age: 41
End: 2021-12-06

## 2021-12-06 NOTE — TELEPHONE ENCOUNTER
Patient needs a refill on his:dextroamphetamine-amphetamine (ADDERALL) 20 mg tablet. Please call @[phone].

## 2021-12-08 ENCOUNTER — TELEPHONE (OUTPATIENT)
Dept: FAMILY MEDICINE CLINIC | Age: 41
End: 2021-12-08

## 2021-12-08 DIAGNOSIS — F90.2 ATTENTION DEFICIT HYPERACTIVITY DISORDER (ADHD), COMBINED TYPE: ICD-10-CM

## 2021-12-08 RX ORDER — DEXTROAMPHETAMINE SACCHARATE, AMPHETAMINE ASPARTATE, DEXTROAMPHETAMINE SULFATE AND AMPHETAMINE SULFATE 5; 5; 5; 5 MG/1; MG/1; MG/1; MG/1
20 TABLET ORAL 2 TIMES DAILY
Qty: 60 TABLET | Refills: 0 | Status: SHIPPED | OUTPATIENT
Start: 2021-12-08 | End: 2022-01-04 | Stop reason: SDUPTHER

## 2021-12-08 NOTE — TELEPHONE ENCOUNTER
Patient mother states that she has been calling for two days requesting RX refill dextroamphetamine-amphetamine (ADDERALL) 20 mg tablet her son is out of this medication she can be reached @ [phone]

## 2021-12-09 NOTE — TELEPHONE ENCOUNTER
Disp Refills Start End    dextroamphetamine-amphetamine (ADDERALL) 20 mg tablet 60 Tablet 0 12/8/2021     Sig - Route: Take 1 Tablet by mouth two (2) times a day.  Max Daily Amount: 40 mg. - Oral    Sent to pharmacy as: dextroamphetamine-amphetamine 20 mg tablet (ADDERALL)    Earliest Fill Date: 12/8/2021    E-Prescribing Status: Receipt confirmed by pharmacy (12/8/2021  3:55 PM EST)

## 2022-01-04 ENCOUNTER — OFFICE VISIT (OUTPATIENT)
Dept: FAMILY MEDICINE CLINIC | Age: 42
End: 2022-01-04
Payer: MEDICAID

## 2022-01-04 VITALS
SYSTOLIC BLOOD PRESSURE: 131 MMHG | HEIGHT: 70 IN | DIASTOLIC BLOOD PRESSURE: 90 MMHG | TEMPERATURE: 98 F | OXYGEN SATURATION: 96 % | BODY MASS INDEX: 25.86 KG/M2 | HEART RATE: 89 BPM | RESPIRATION RATE: 18 BRPM | WEIGHT: 180.6 LBS

## 2022-01-04 DIAGNOSIS — F90.2 ATTENTION DEFICIT HYPERACTIVITY DISORDER (ADHD), COMBINED TYPE: ICD-10-CM

## 2022-01-04 PROCEDURE — 99213 OFFICE O/P EST LOW 20 MIN: CPT | Performed by: FAMILY MEDICINE

## 2022-01-04 RX ORDER — DEXTROAMPHETAMINE SACCHARATE, AMPHETAMINE ASPARTATE, DEXTROAMPHETAMINE SULFATE AND AMPHETAMINE SULFATE 5; 5; 5; 5 MG/1; MG/1; MG/1; MG/1
20 TABLET ORAL 2 TIMES DAILY
Qty: 60 TABLET | Refills: 0 | Status: SHIPPED | OUTPATIENT
Start: 2022-01-04 | End: 2022-02-01 | Stop reason: SDUPTHER

## 2022-01-04 RX ORDER — DEXTROAMPHETAMINE SACCHARATE, AMPHETAMINE ASPARTATE, DEXTROAMPHETAMINE SULFATE AND AMPHETAMINE SULFATE 5; 5; 5; 5 MG/1; MG/1; MG/1; MG/1
20 TABLET ORAL 2 TIMES DAILY
Qty: 60 TABLET | Refills: 0 | Status: SHIPPED | OUTPATIENT
Start: 2022-01-04 | End: 2022-01-04 | Stop reason: SDUPTHER

## 2022-01-04 RX ORDER — AMOXICILLIN 500 MG/1
500 CAPSULE ORAL 3 TIMES DAILY
Qty: 30 CAPSULE | Refills: 0 | Status: SHIPPED | OUTPATIENT
Start: 2022-01-04 | End: 2022-01-14

## 2022-01-04 NOTE — PROGRESS NOTES
HISTORY OF PRESENT ILLNESS  Jaquelin Dewey III is a 42 y.o. male. HPI In for followup. Has passed a kidney stone. Still having some mild dysuria. No fever, chills. Still working with cars. Also needs refill of  adderall    ROS    Physical Exam  Vitals and nursing note reviewed. Constitutional:       Appearance: He is well-developed. HENT:      Right Ear: External ear normal.      Left Ear: External ear normal.   Neck:      Thyroid: No thyromegaly. Cardiovascular:      Rate and Rhythm: Normal rate and regular rhythm. Heart sounds: Normal heart sounds. Pulmonary:      Effort: Pulmonary effort is normal. No respiratory distress. Breath sounds: Normal breath sounds. No wheezing. Abdominal:      General: Bowel sounds are normal. There is no distension. Palpations: Abdomen is soft. There is no mass. Tenderness: There is no abdominal tenderness. There is no guarding. Musculoskeletal:         General: Normal range of motion. Lymphadenopathy:      Cervical: No cervical adenopathy. ASSESSMENT and PLAN  Orders Placed This Encounter    DISCONTD: dextroamphetamine-amphetamine (ADDERALL) 20 mg tablet    DISCONTD: dextroamphetamine-amphetamine (ADDERALL) 20 mg tablet    dextroamphetamine-amphetamine (ADDERALL) 20 mg tablet    amoxicillin (AMOXIL) 500 mg capsule     Diagnoses and all orders for this visit:    1. Attention deficit hyperactivity disorder (ADHD), combined type  -     dextroamphetamine-amphetamine (ADDERALL) 20 mg tablet; Take 1 Tablet by mouth two (2) times a day. Max Daily Amount: 40 mg. Other orders  -     amoxicillin (AMOXIL) 500 mg capsule; Take 1 Capsule by mouth three (3) times daily for 10 days.

## 2022-01-28 ENCOUNTER — OFFICE VISIT (OUTPATIENT)
Dept: FAMILY MEDICINE CLINIC | Age: 42
End: 2022-01-28
Payer: MEDICAID

## 2022-01-28 VITALS
HEART RATE: 74 BPM | BODY MASS INDEX: 25.05 KG/M2 | RESPIRATION RATE: 16 BRPM | TEMPERATURE: 97 F | WEIGHT: 175 LBS | DIASTOLIC BLOOD PRESSURE: 84 MMHG | OXYGEN SATURATION: 97 % | HEIGHT: 70 IN | SYSTOLIC BLOOD PRESSURE: 123 MMHG

## 2022-01-28 DIAGNOSIS — R04.2 HEMOPTYSIS: Primary | ICD-10-CM

## 2022-01-28 DIAGNOSIS — J40 BRONCHITIS: ICD-10-CM

## 2022-01-28 DIAGNOSIS — K62.5 RECTAL BLEEDING: ICD-10-CM

## 2022-01-28 LAB
BASOPHILS # BLD: 0 K/UL (ref 0–0.1)
BASOPHILS NFR BLD: 0 % (ref 0–1)
DIFFERENTIAL METHOD BLD: NORMAL
EOSINOPHIL # BLD: 0 K/UL (ref 0–0.4)
EOSINOPHIL NFR BLD: 0 % (ref 0–7)
ERYTHROCYTE [DISTWIDTH] IN BLOOD BY AUTOMATED COUNT: 14.1 % (ref 11.5–14.5)
HCT VFR BLD AUTO: 43.1 % (ref 36.6–50.3)
HGB BLD-MCNC: 13.5 G/DL (ref 12.1–17)
IMM GRANULOCYTES # BLD AUTO: 0 K/UL (ref 0–0.04)
IMM GRANULOCYTES NFR BLD AUTO: 0 % (ref 0–0.5)
LYMPHOCYTES # BLD: 2.1 K/UL (ref 0.8–3.5)
LYMPHOCYTES NFR BLD: 28 % (ref 12–49)
MCH RBC QN AUTO: 27.3 PG (ref 26–34)
MCHC RBC AUTO-ENTMCNC: 31.3 G/DL (ref 30–36.5)
MCV RBC AUTO: 87.1 FL (ref 80–99)
MONOCYTES # BLD: 0.4 K/UL (ref 0–1)
MONOCYTES NFR BLD: 6 % (ref 5–13)
NEUTS SEG # BLD: 4.7 K/UL (ref 1.8–8)
NEUTS SEG NFR BLD: 66 % (ref 32–75)
NRBC # BLD: 0 K/UL (ref 0–0.01)
NRBC BLD-RTO: 0 PER 100 WBC
PLATELET # BLD AUTO: 196 K/UL (ref 150–400)
PMV BLD AUTO: 11.6 FL (ref 8.9–12.9)
RBC # BLD AUTO: 4.95 M/UL (ref 4.1–5.7)
WBC # BLD AUTO: 7.2 K/UL (ref 4.1–11.1)

## 2022-01-28 PROCEDURE — 99213 OFFICE O/P EST LOW 20 MIN: CPT | Performed by: FAMILY MEDICINE

## 2022-01-28 RX ORDER — PANTOPRAZOLE SODIUM 40 MG/1
40 TABLET, DELAYED RELEASE ORAL DAILY
Qty: 30 TABLET | Refills: 5 | Status: SHIPPED | OUTPATIENT
Start: 2022-01-28 | End: 2022-08-03

## 2022-01-28 RX ORDER — AZITHROMYCIN 250 MG/1
TABLET, FILM COATED ORAL
Qty: 6 TABLET | Refills: 0 | Status: SHIPPED | OUTPATIENT
Start: 2022-01-28 | End: 2022-02-02

## 2022-01-28 NOTE — PROGRESS NOTES
Identified pt with two pt identifiers(name and ). Reviewed record in preparation for visit and have obtained necessary documentation. Chief Complaint   Patient presents with    Anal Bleeding     pt reports passing blood clots; reports stomach ulcers    Cough     coughing up blood, requesting chest xray    Medication Refill     adderall        Vitals:    22 1054 22 1153   BP: (!) 144/99 123/84   Pulse: 74    Resp: 16    Temp: 97 °F (36.1 °C)    TempSrc: Oral    SpO2: 97%    Weight: 175 lb (79.4 kg)    Height: 5' 10\" (1.778 m)    PainSc:   8    PainLoc: Abdomen        Health Maintenance Due   Topic    Pneumococcal 0-64 years (1 of 2 - PPSV23)    Lipid Screen     Flu Vaccine (1)    COVID-19 Vaccine (3 - Booster for Payne Peter series)       Coordination of Care Questionnaire:  :   1) Have you been to an emergency room, urgent care, or hospitalized since your last visit? If yes, where when, and reason for visit? no       2. Have seen or consulted any other health care provider since your last visit? If yes, where when, and reason for visit? NO      Patient is accompanied by self I have received verbal consent from America Ramirez III to discuss any/all medical information while they are present in the room.

## 2022-01-28 NOTE — PROGRESS NOTES
HISTORY OF PRESENT ILLNESS  Merlin Dsouza III is a 42 y.o. male. HPI Has been having 3 week hx diffuse abdominal pains. No diarrhea, constipation. Some nausea, vomiting. Throws up some blood intermittently. Has also been coughing up some blood, usually in the am. Mostly blood tinged sputum. Smokes 1/2 ppd. Passed a large clot of blood in stool 2 days ago. Has also had some other bleeding. Stools have been hard. Had a normal colonoscopy last summer by Dr. Harriet Altamirano and nursing note reviewed. Constitutional:       Appearance: He is well-developed. HENT:      Right Ear: External ear normal.      Left Ear: External ear normal.   Neck:      Thyroid: No thyromegaly. Cardiovascular:      Rate and Rhythm: Normal rate and regular rhythm. Heart sounds: Normal heart sounds. Pulmonary:      Effort: Pulmonary effort is normal. No respiratory distress. Breath sounds: Normal breath sounds. No wheezing. Abdominal:      General: Bowel sounds are normal. There is no distension. Palpations: Abdomen is soft. There is no mass. Tenderness: There is no abdominal tenderness. There is no guarding. Musculoskeletal:         General: Normal range of motion. Lymphadenopathy:      Cervical: No cervical adenopathy. ASSESSMENT and PLAN  Orders Placed This Encounter    XR CHEST PA LAT    CBC WITH AUTOMATED DIFF    azithromycin (ZITHROMAX) 250 mg tablet    pantoprazole (PROTONIX) 40 mg tablet     Diagnoses and all orders for this visit:    1. Hemoptysis  -     XR CHEST PA LAT; Future    2. Rectal bleeding  -     CBC WITH AUTOMATED DIFF; Future    3. Bronchitis    Other orders  -     azithromycin (ZITHROMAX) 250 mg tablet; Take 2 tablets today, then take 1 tablet daily  -     pantoprazole (PROTONIX) 40 mg tablet; Take 1 Tablet by mouth daily.  For stomach pain

## 2022-02-01 DIAGNOSIS — F90.2 ATTENTION DEFICIT HYPERACTIVITY DISORDER (ADHD), COMBINED TYPE: ICD-10-CM

## 2022-02-01 RX ORDER — DEXTROAMPHETAMINE SACCHARATE, AMPHETAMINE ASPARTATE, DEXTROAMPHETAMINE SULFATE AND AMPHETAMINE SULFATE 5; 5; 5; 5 MG/1; MG/1; MG/1; MG/1
20 TABLET ORAL 2 TIMES DAILY
Qty: 60 TABLET | Refills: 0 | Status: SHIPPED | OUTPATIENT
Start: 2022-02-01 | End: 2022-03-03 | Stop reason: SDUPTHER

## 2022-03-03 DIAGNOSIS — F90.2 ATTENTION DEFICIT HYPERACTIVITY DISORDER (ADHD), COMBINED TYPE: ICD-10-CM

## 2022-03-03 NOTE — TELEPHONE ENCOUNTER
Patient mother states that her son need a RX refill dextroamphetamine-amphetamine (ADDERALL) 20 mg tablet she can be reached @ [phone]

## 2022-03-03 NOTE — TELEPHONE ENCOUNTER
Requested Prescriptions     Pending Prescriptions Disp Refills    dextroamphetamine-amphetamine (ADDERALL) 20 mg tablet 60 Tablet 0     Sig: Take 1 Tablet by mouth two (2) times a day. Max Daily Amount: 40 mg.        Last fill 2/1/2022 for #60 w/ no addtnl  Last OV 1/4/2022  No appts scheduled at this time    Hamlet Barrientos LPN

## 2022-03-07 RX ORDER — DEXTROAMPHETAMINE SACCHARATE, AMPHETAMINE ASPARTATE, DEXTROAMPHETAMINE SULFATE AND AMPHETAMINE SULFATE 5; 5; 5; 5 MG/1; MG/1; MG/1; MG/1
20 TABLET ORAL 2 TIMES DAILY
Qty: 60 TABLET | Refills: 0 | Status: SHIPPED | OUTPATIENT
Start: 2022-03-07 | End: 2022-04-04 | Stop reason: SDUPTHER

## 2022-03-20 PROBLEM — F98.8 ADD (ATTENTION DEFICIT DISORDER): Status: ACTIVE | Noted: 2020-09-25

## 2022-05-04 DIAGNOSIS — F90.2 ATTENTION DEFICIT HYPERACTIVITY DISORDER (ADHD), COMBINED TYPE: ICD-10-CM

## 2022-05-04 RX ORDER — DEXTROAMPHETAMINE SACCHARATE, AMPHETAMINE ASPARTATE, DEXTROAMPHETAMINE SULFATE AND AMPHETAMINE SULFATE 5; 5; 5; 5 MG/1; MG/1; MG/1; MG/1
20 TABLET ORAL 2 TIMES DAILY
Qty: 60 TABLET | Refills: 0 | Status: SHIPPED | OUTPATIENT
Start: 2022-05-04 | End: 2022-06-03 | Stop reason: SDUPTHER

## 2022-05-04 NOTE — TELEPHONE ENCOUNTER
Patient's mother is calling for patient. He needs a refill on his:dextroamphetamine-amphetamine (ADDERALL) 20 mg tablet. Please call @[phone].

## 2022-06-01 ENCOUNTER — TELEPHONE (OUTPATIENT)
Dept: FAMILY MEDICINE CLINIC | Age: 42
End: 2022-06-01

## 2022-06-01 NOTE — TELEPHONE ENCOUNTER
Patient's mother is calling to get a refill on patient's:dextroamphetamine-amphetamine (ADDERALL) 20 mg tablet.

## 2022-06-03 DIAGNOSIS — F90.2 ATTENTION DEFICIT HYPERACTIVITY DISORDER (ADHD), COMBINED TYPE: ICD-10-CM

## 2022-06-03 NOTE — TELEPHONE ENCOUNTER
Patient mother is requesting a RX refill dextroamphetamine-amphetamine (ADDERALL) 20 mg tablet she can be reached @ [phone]

## 2022-06-06 RX ORDER — DEXTROAMPHETAMINE SACCHARATE, AMPHETAMINE ASPARTATE, DEXTROAMPHETAMINE SULFATE AND AMPHETAMINE SULFATE 5; 5; 5; 5 MG/1; MG/1; MG/1; MG/1
20 TABLET ORAL 2 TIMES DAILY
Qty: 60 TABLET | Refills: 0 | Status: SHIPPED | OUTPATIENT
Start: 2022-06-06 | End: 2022-07-07 | Stop reason: SDUPTHER

## 2022-07-02 RX ORDER — DULOXETIN HYDROCHLORIDE 60 MG/1
CAPSULE, DELAYED RELEASE ORAL
Qty: 90 CAPSULE | Refills: 2 | Status: SHIPPED | OUTPATIENT
Start: 2022-07-02

## 2022-07-05 ENCOUNTER — TELEPHONE (OUTPATIENT)
Dept: FAMILY MEDICINE CLINIC | Age: 42
End: 2022-07-05

## 2022-07-05 NOTE — TELEPHONE ENCOUNTER
Patient mother called regarding a refill on his:dextroamphetamine-amphetamine (ADDERALL) 20 mg tablet. Please call @[phone].

## 2022-07-07 DIAGNOSIS — F90.2 ATTENTION DEFICIT HYPERACTIVITY DISORDER (ADHD), COMBINED TYPE: ICD-10-CM

## 2022-07-07 RX ORDER — DEXTROAMPHETAMINE SACCHARATE, AMPHETAMINE ASPARTATE, DEXTROAMPHETAMINE SULFATE AND AMPHETAMINE SULFATE 5; 5; 5; 5 MG/1; MG/1; MG/1; MG/1
20 TABLET ORAL 2 TIMES DAILY
Qty: 60 TABLET | Refills: 0 | Status: SHIPPED | OUTPATIENT
Start: 2022-07-07 | End: 2022-08-09 | Stop reason: SDUPTHER

## 2022-07-07 NOTE — TELEPHONE ENCOUNTER
Patient mother is requesting a RX refill          dextroamphetamine-amphetamine (ADDERALL) 20 mg tablet he is completely out she can be reached @ [phone]

## 2022-08-03 RX ORDER — PANTOPRAZOLE SODIUM 40 MG/1
40 TABLET, DELAYED RELEASE ORAL DAILY
Qty: 30 TABLET | Refills: 5 | Status: SHIPPED | OUTPATIENT
Start: 2022-08-03

## 2022-08-09 DIAGNOSIS — F90.2 ATTENTION DEFICIT HYPERACTIVITY DISORDER (ADHD), COMBINED TYPE: ICD-10-CM

## 2022-08-09 RX ORDER — DEXTROAMPHETAMINE SACCHARATE, AMPHETAMINE ASPARTATE, DEXTROAMPHETAMINE SULFATE AND AMPHETAMINE SULFATE 5; 5; 5; 5 MG/1; MG/1; MG/1; MG/1
20 TABLET ORAL 2 TIMES DAILY
Qty: 60 TABLET | Refills: 0 | Status: SHIPPED | OUTPATIENT
Start: 2022-08-09 | End: 2022-09-06 | Stop reason: SDUPTHER

## 2022-08-09 NOTE — TELEPHONE ENCOUNTER
Patient's mother is calling to get a refill on patient's:dextroamphetamine-amphetamine (ADDERALL) 20 mg tablet . Please call [phone].

## 2022-08-09 NOTE — TELEPHONE ENCOUNTER
Appt made for 8/23/22. Per patient's mom he is trying to come off of methadone clinic med . Patient's mother reminded that patient is adult and has been told to follow up every six months in past.  Appt made today for 8/23/22. Will send med refill in to pcp for review.

## 2022-08-23 ENCOUNTER — OFFICE VISIT (OUTPATIENT)
Dept: FAMILY MEDICINE CLINIC | Age: 42
End: 2022-08-23
Payer: MEDICAID

## 2022-08-23 VITALS
WEIGHT: 165 LBS | HEART RATE: 83 BPM | HEIGHT: 70 IN | SYSTOLIC BLOOD PRESSURE: 170 MMHG | TEMPERATURE: 97.8 F | DIASTOLIC BLOOD PRESSURE: 99 MMHG | BODY MASS INDEX: 23.62 KG/M2 | OXYGEN SATURATION: 99 % | RESPIRATION RATE: 16 BRPM

## 2022-08-23 DIAGNOSIS — L24.7 IRRITANT CONTACT DERMATITIS DUE TO PLANTS, EXCEPT FOOD: Primary | ICD-10-CM

## 2022-08-23 DIAGNOSIS — F90.2 ATTENTION DEFICIT HYPERACTIVITY DISORDER (ADHD), COMBINED TYPE: ICD-10-CM

## 2022-08-23 PROCEDURE — 99213 OFFICE O/P EST LOW 20 MIN: CPT | Performed by: FAMILY MEDICINE

## 2022-08-23 RX ORDER — METHYLPREDNISOLONE 4 MG/1
4 TABLET ORAL
Qty: 1 DOSE PACK | Refills: 0 | Status: SHIPPED | OUTPATIENT
Start: 2022-08-23

## 2022-08-23 NOTE — PROGRESS NOTES
HISTORY OF PRESENT ILLNESS  Lonza Sho SHRESTHA is a 36 y.o. male. HPI In for refill of adderall. Also has gotten poison ivy working outside in yard. ROS    Physical Exam  Vitals and nursing note reviewed. Constitutional:       Appearance: He is well-developed. HENT:      Right Ear: External ear normal.      Left Ear: External ear normal.   Neck:      Thyroid: No thyromegaly. Cardiovascular:      Rate and Rhythm: Normal rate and regular rhythm. Heart sounds: Normal heart sounds. Pulmonary:      Effort: Pulmonary effort is normal. No respiratory distress. Breath sounds: Normal breath sounds. No wheezing. Abdominal:      General: Bowel sounds are normal. There is no distension. Palpations: Abdomen is soft. There is no mass. Tenderness: There is no abdominal tenderness. There is no guarding. Musculoskeletal:         General: Normal range of motion. Lymphadenopathy:      Cervical: No cervical adenopathy. Skin:     Comments: Several excoriated lesion on R hand and R  arm     ASSESSMENT and PLAN  Orders Placed This Encounter    methylPREDNISolone (MEDROL DOSEPACK) 4 mg tablet     Diagnoses and all orders for this visit:    1. Irritant contact dermatitis due to plants, except food    2. Attention deficit hyperactivity disorder (ADHD), combined type    Other orders  -     methylPREDNISolone (MEDROL DOSEPACK) 4 mg tablet; Take 1 Tablet by mouth Specific Days and Specific Times. Follow-up and Dispositions    Return in about 6 months (around 2/23/2023).

## 2022-08-23 NOTE — PROGRESS NOTES
Chief Complaint   Patient presents with    Medication Refill    Rash       1. \"Have you been to the ER, urgent care clinic since your last visit? Hospitalized since your last visit? \" No    2. \"Have you seen or consulted any other health care providers outside of the [address] [place] [address] since your last visit? \" No     3. For patients aged 39-70: Has the patient had a colonoscopy / FIT/ Cologuard? Yes - no Care Gap present      If the patient is female:    4. For patients aged 41-77: Has the patient had a mammogram within the past 2 years? NA - based on age or sex      11. For patients aged 21-65: Has the patient had a pap smear? NA - based on age or sex    Health Maintenance Due   Topic Date Due    Pneumococcal 0-64 years (1 - PCV) Never done    Lipid Screen  Never done    COVID-19 Vaccine (3 - Booster for Pfizer series) 11/12/2021     Patient notes poison ivy for one week on right hand. Notes has not been to methadone clinic in at least 3 weeks per patient. Very hyper.

## 2022-09-06 DIAGNOSIS — F90.2 ATTENTION DEFICIT HYPERACTIVITY DISORDER (ADHD), COMBINED TYPE: ICD-10-CM

## 2022-09-06 RX ORDER — DEXTROAMPHETAMINE SACCHARATE, AMPHETAMINE ASPARTATE, DEXTROAMPHETAMINE SULFATE AND AMPHETAMINE SULFATE 5; 5; 5; 5 MG/1; MG/1; MG/1; MG/1
20 TABLET ORAL 2 TIMES DAILY
Qty: 60 TABLET | Refills: 0 | Status: SHIPPED | OUTPATIENT
Start: 2022-09-06 | End: 2022-10-10 | Stop reason: SDUPTHER

## 2022-09-06 NOTE — TELEPHONE ENCOUNTER
Patient called and left message asking for a refill on his Adderall. Last visit:8/23/22  Next visit:2/23/23  Previous refill 8/09/22(60+0R)    Requested Prescriptions     Pending Prescriptions Disp Refills    dextroamphetamine-amphetamine (ADDERALL) 20 mg tablet 60 Tablet 0     Sig: Take 1 Tablet by mouth two (2) times a day. Max Daily Amount: 40 mg. Please review  before prescribing new script for this medication. Thanks,  [address] in place:   Recommendation Provided To:    Intervention Detail: New Rx: 1, reason: Patient Preference  Gap Closed?:   Intervention Accepted By:   Time Spent (min): 5

## 2022-10-03 RX ORDER — DIVALPROEX SODIUM 500 MG/1
TABLET, DELAYED RELEASE ORAL
Qty: 180 TABLET | Refills: 1 | Status: SHIPPED | OUTPATIENT
Start: 2022-10-03

## 2022-10-10 DIAGNOSIS — F90.2 ATTENTION DEFICIT HYPERACTIVITY DISORDER (ADHD), COMBINED TYPE: ICD-10-CM

## 2022-10-10 RX ORDER — DEXTROAMPHETAMINE SACCHARATE, AMPHETAMINE ASPARTATE, DEXTROAMPHETAMINE SULFATE AND AMPHETAMINE SULFATE 5; 5; 5; 5 MG/1; MG/1; MG/1; MG/1
20 TABLET ORAL 2 TIMES DAILY
Qty: 60 TABLET | Refills: 0 | Status: SHIPPED | OUTPATIENT
Start: 2022-10-10

## 2022-11-07 DIAGNOSIS — F90.2 ATTENTION DEFICIT HYPERACTIVITY DISORDER (ADHD), COMBINED TYPE: ICD-10-CM

## 2022-11-07 RX ORDER — DEXTROAMPHETAMINE SACCHARATE, AMPHETAMINE ASPARTATE, DEXTROAMPHETAMINE SULFATE AND AMPHETAMINE SULFATE 5; 5; 5; 5 MG/1; MG/1; MG/1; MG/1
20 TABLET ORAL 2 TIMES DAILY
Qty: 60 TABLET | Refills: 0 | Status: SHIPPED | OUTPATIENT
Start: 2022-11-07

## 2022-11-07 NOTE — TELEPHONE ENCOUNTER
Patient mother is requesting a RX refill dextroamphetamine-amphetamine (ADDERALL) 20 mg tablet she can be reached @ [phone]

## 2022-12-08 DIAGNOSIS — F90.2 ATTENTION DEFICIT HYPERACTIVITY DISORDER (ADHD), COMBINED TYPE: ICD-10-CM

## 2022-12-08 RX ORDER — DEXTROAMPHETAMINE SACCHARATE, AMPHETAMINE ASPARTATE, DEXTROAMPHETAMINE SULFATE AND AMPHETAMINE SULFATE 5; 5; 5; 5 MG/1; MG/1; MG/1; MG/1
20 TABLET ORAL 2 TIMES DAILY
Qty: 60 TABLET | Refills: 0 | Status: SHIPPED | OUTPATIENT
Start: 2022-12-08

## 2022-12-08 NOTE — TELEPHONE ENCOUNTER
Patient mother Gui Morales is requesting a RX refill       dextroamphetamine-amphetamine (ADDERALL) 20 mg tablet she can be reached @ [phone]

## 2022-12-12 ENCOUNTER — TELEPHONE (OUTPATIENT)
Dept: FAMILY MEDICINE CLINIC | Age: 42
End: 2022-12-12

## 2022-12-12 NOTE — TELEPHONE ENCOUNTER
Patients mother is calling to get the medication for  (ADDERALL) 20 mg tablet .   If any questions please give her a call @ [phone]

## 2022-12-14 RX ORDER — PANTOPRAZOLE SODIUM 40 MG/1
40 TABLET, DELAYED RELEASE ORAL DAILY
Qty: 90 TABLET | Refills: 1 | Status: SHIPPED | OUTPATIENT
Start: 2022-12-14

## 2023-01-17 DIAGNOSIS — F90.2 ATTENTION DEFICIT HYPERACTIVITY DISORDER (ADHD), COMBINED TYPE: ICD-10-CM

## 2023-01-17 RX ORDER — DEXTROAMPHETAMINE SACCHARATE, AMPHETAMINE ASPARTATE, DEXTROAMPHETAMINE SULFATE AND AMPHETAMINE SULFATE 5; 5; 5; 5 MG/1; MG/1; MG/1; MG/1
20 TABLET ORAL 2 TIMES DAILY
Qty: 60 TABLET | Refills: 0 | Status: SHIPPED | OUTPATIENT
Start: 2023-01-17

## 2023-01-17 NOTE — TELEPHONE ENCOUNTER
Patient mother states that patient need a RX refill dextroamphetamine-amphetamine (ADDERALL) 20 mg tablet @ [phone]

## 2023-02-17 DIAGNOSIS — F90.2 ATTENTION DEFICIT HYPERACTIVITY DISORDER (ADHD), COMBINED TYPE: ICD-10-CM

## 2023-02-17 NOTE — TELEPHONE ENCOUNTER
Patient needs rx refill for dextroamphetamine-amphetamine (ADDERALL) 20 mg tablet   He can be reached at [phone]. Pharmacy has been verified.

## 2023-02-20 RX ORDER — DEXTROAMPHETAMINE SACCHARATE, AMPHETAMINE ASPARTATE, DEXTROAMPHETAMINE SULFATE AND AMPHETAMINE SULFATE 5; 5; 5; 5 MG/1; MG/1; MG/1; MG/1
20 TABLET ORAL 2 TIMES DAILY
Qty: 60 TABLET | Refills: 0 | Status: SHIPPED | OUTPATIENT
Start: 2023-02-20 | End: 2023-02-23 | Stop reason: SDUPTHER

## 2023-02-23 ENCOUNTER — OFFICE VISIT (OUTPATIENT)
Dept: FAMILY MEDICINE CLINIC | Age: 43
End: 2023-02-23
Payer: MEDICAID

## 2023-02-23 VITALS
TEMPERATURE: 97.8 F | SYSTOLIC BLOOD PRESSURE: 124 MMHG | RESPIRATION RATE: 16 BRPM | OXYGEN SATURATION: 98 % | HEART RATE: 88 BPM | DIASTOLIC BLOOD PRESSURE: 85 MMHG | BODY MASS INDEX: 21.19 KG/M2 | WEIGHT: 148 LBS | HEIGHT: 70 IN

## 2023-02-23 DIAGNOSIS — L29.9 ITCHING: Primary | ICD-10-CM

## 2023-02-23 DIAGNOSIS — F90.2 ATTENTION DEFICIT HYPERACTIVITY DISORDER (ADHD), COMBINED TYPE: ICD-10-CM

## 2023-02-23 PROCEDURE — 99213 OFFICE O/P EST LOW 20 MIN: CPT | Performed by: FAMILY MEDICINE

## 2023-02-23 RX ORDER — AZITHROMYCIN 250 MG/1
TABLET, FILM COATED ORAL
Qty: 6 TABLET | Refills: 0 | Status: SHIPPED | OUTPATIENT
Start: 2023-02-23 | End: 2023-02-28

## 2023-02-23 RX ORDER — DEXTROAMPHETAMINE SACCHARATE, AMPHETAMINE ASPARTATE, DEXTROAMPHETAMINE SULFATE AND AMPHETAMINE SULFATE 5; 5; 5; 5 MG/1; MG/1; MG/1; MG/1
20 TABLET ORAL 2 TIMES DAILY
Qty: 60 TABLET | Refills: 0 | Status: SHIPPED | OUTPATIENT
Start: 2023-02-23

## 2023-02-23 RX ORDER — METHYLPREDNISOLONE 4 MG/1
TABLET ORAL
Qty: 1 DOSE PACK | Refills: 0 | Status: SHIPPED | OUTPATIENT
Start: 2023-02-23

## 2023-02-23 RX ORDER — DIVALPROEX SODIUM 500 MG/1
TABLET, DELAYED RELEASE ORAL
Qty: 180 TABLET | Refills: 1 | Status: SHIPPED | OUTPATIENT
Start: 2023-02-23

## 2023-02-23 NOTE — PROGRESS NOTES
HISTORY OF PRESENT ILLNESS  Nikolai Fuentes III is a 36 y.o. male. HPI broke out in a rash on trunk after getting upset with someone on the phone. Has broken out in a similar rash in the past when has gotten upset. Moderate itching. Not taking any meds for it. Needs refill of adderall. Has been helping his mother, currently living with her. She is selling her house and moving in with her dad. Needs to fix her house, get his truck fixed, find a new place to live. ROS    Physical Exam  Vitals and nursing note reviewed. Constitutional:       Appearance: He is well-developed. HENT:      Right Ear: External ear normal.      Left Ear: External ear normal.   Neck:      Thyroid: No thyromegaly. Cardiovascular:      Rate and Rhythm: Normal rate and regular rhythm. Heart sounds: Normal heart sounds. Pulmonary:      Effort: Pulmonary effort is normal. No respiratory distress. Breath sounds: Normal breath sounds. No wheezing. Abdominal:      General: Bowel sounds are normal. There is no distension. Palpations: Abdomen is soft. There is no mass. Tenderness: There is no abdominal tenderness. There is no guarding. Musculoskeletal:         General: Normal range of motion. Lymphadenopathy:      Cervical: No cervical adenopathy. Skin:     Comments: Multiple excoriations on anterior chest       ASSESSMENT and PLAN  Orders Placed This Encounter    azithromycin (ZITHROMAX) 250 mg tablet    methylPREDNISolone (MEDROL DOSEPACK) 4 mg tablet    dextroamphetamine-amphetamine (ADDERALL) 20 mg tablet     Diagnoses and all orders for this visit:    1. Itching    2. Attention deficit hyperactivity disorder (ADHD), combined type  -     dextroamphetamine-amphetamine (ADDERALL) 20 mg tablet; Take 1 Tablet by mouth two (2) times a day. Max Daily Amount: 40 mg. Other orders  -     azithromycin (ZITHROMAX) 250 mg tablet;  Take 2 tablets today, then take 1 tablet daily  -     methylPREDNISolone (MEDROL DOSEPACK) 4 mg tablet;  As directed

## 2023-02-23 NOTE — PROGRESS NOTES
Chief Complaint   Patient presents with    Follow-up       1. \"Have you been to the ER, urgent care clinic since your last visit? Hospitalized since your last visit? \" No    2. \"Have you seen or consulted any other health care providers outside of the [address] [place] [address] since your last visit? \" Yes [facility]      3. For patients aged 39-70: Has the patient had a colonoscopy / FIT/ Cologuard? Yes - no Care Gap present      If the patient is female:    4. For patients aged 41-77: Has the patient had a mammogram within the past 2 years? NA - based on age or sex      11. For patients aged 21-65: Has the patient had a pap smear?  NA - based on age or sex    Health Maintenance Due   Topic Date Due    Pneumococcal 0-64 years (1 - PCV) Never done    Hepatitis B Vaccine (1 of 3 - Risk 3-dose series) Never done    Lipid Screen  Never done    COVID-19 Vaccine (3 - Booster for Pfizer series) 08/07/2021    Flu Vaccine (1) Never done

## 2023-03-02 ENCOUNTER — TELEPHONE (OUTPATIENT)
Dept: FAMILY MEDICINE CLINIC | Age: 43
End: 2023-03-02

## 2023-03-02 NOTE — TELEPHONE ENCOUNTER
Per pharmacy, Amphet/Dextro is on backorder. Please advise        For Pharmacy Admin Tracking Only    Program: Medication Refill  CPA in place:   Recommendation Provided To:    Intervention Detail: New Rx: 1, reason: Needs Additional Therapy  Intervention Accepted By:   Gap Closed?:   Time Spent (min): 5

## 2023-03-02 NOTE — TELEPHONE ENCOUNTER
Spoke with pharmacist and they have the 5 mg available; will advise dr. Abby Dowling to change dose, amount and instructions so they are able to fill the medication.

## 2023-04-19 DIAGNOSIS — F90.2 ATTENTION DEFICIT HYPERACTIVITY DISORDER (ADHD), COMBINED TYPE: ICD-10-CM

## 2023-04-19 NOTE — TELEPHONE ENCOUNTER
Patient wants to get the medication dextroamphetamine-amphetamine (ADDERALL) 20 mg tablet. Please send it to [facility] [phone]  [facility] is out of the medication.   Please give him a call @ [phone]

## 2023-04-19 NOTE — TELEPHONE ENCOUNTER
Dr. Mode Gastelum out of office. Will return 5/4/23. Last office visit 2/23/23. Last refill2/23/23.  Next office visit 5/24/23

## 2023-04-20 RX ORDER — DEXTROAMPHETAMINE SACCHARATE, AMPHETAMINE ASPARTATE, DEXTROAMPHETAMINE SULFATE AND AMPHETAMINE SULFATE 5; 5; 5; 5 MG/1; MG/1; MG/1; MG/1
20 TABLET ORAL 2 TIMES DAILY
Qty: 60 TABLET | Refills: 0 | Status: SHIPPED | OUTPATIENT
Start: 2023-04-20

## 2023-04-26 ENCOUNTER — TELEPHONE (OUTPATIENT)
Dept: FAMILY MEDICINE CLINIC | Age: 43
End: 2023-04-26

## 2023-04-26 NOTE — TELEPHONE ENCOUNTER
Patient notified that pcp out of office until 5/4/23. Patient instructed he needs to update his urine drug screen and pain contract. Patient picked up adderall.   Will keep appt on 5/24/23

## 2023-05-24 ENCOUNTER — OFFICE VISIT (OUTPATIENT)
Age: 43
End: 2023-05-24
Payer: MEDICAID

## 2023-05-24 VITALS
WEIGHT: 155.4 LBS | SYSTOLIC BLOOD PRESSURE: 128 MMHG | DIASTOLIC BLOOD PRESSURE: 82 MMHG | HEART RATE: 79 BPM | HEIGHT: 70 IN | TEMPERATURE: 97.8 F | OXYGEN SATURATION: 98 % | RESPIRATION RATE: 16 BRPM | BODY MASS INDEX: 22.25 KG/M2

## 2023-05-24 DIAGNOSIS — F90.2 ATTENTION DEFICIT HYPERACTIVITY DISORDER (ADHD), COMBINED TYPE: Primary | ICD-10-CM

## 2023-05-24 PROCEDURE — 99213 OFFICE O/P EST LOW 20 MIN: CPT | Performed by: FAMILY MEDICINE

## 2023-05-24 RX ORDER — PANTOPRAZOLE SODIUM 40 MG/1
TABLET, DELAYED RELEASE ORAL
COMMUNITY
Start: 2023-04-16

## 2023-05-24 RX ORDER — METHYLPREDNISOLONE 4 MG/1
TABLET ORAL
Qty: 1 KIT | Refills: 0 | Status: SHIPPED | OUTPATIENT
Start: 2023-05-24 | End: 2023-05-30

## 2023-05-24 RX ORDER — DEXTROAMPHETAMINE SACCHARATE, AMPHETAMINE ASPARTATE, DEXTROAMPHETAMINE SULFATE AND AMPHETAMINE SULFATE 5; 5; 5; 5 MG/1; MG/1; MG/1; MG/1
20 TABLET ORAL 2 TIMES DAILY
Qty: 60 TABLET | Refills: 0 | Status: SHIPPED | OUTPATIENT
Start: 2023-05-24 | End: 2023-06-23

## 2023-05-24 SDOH — ECONOMIC STABILITY: FOOD INSECURITY: WITHIN THE PAST 12 MONTHS, YOU WORRIED THAT YOUR FOOD WOULD RUN OUT BEFORE YOU GOT MONEY TO BUY MORE.: SOMETIMES TRUE

## 2023-05-24 SDOH — ECONOMIC STABILITY: HOUSING INSECURITY
IN THE LAST 12 MONTHS, WAS THERE A TIME WHEN YOU DID NOT HAVE A STEADY PLACE TO SLEEP OR SLEPT IN A SHELTER (INCLUDING NOW)?: NO

## 2023-05-24 SDOH — ECONOMIC STABILITY: FOOD INSECURITY: WITHIN THE PAST 12 MONTHS, THE FOOD YOU BOUGHT JUST DIDN'T LAST AND YOU DIDN'T HAVE MONEY TO GET MORE.: SOMETIMES TRUE

## 2023-05-24 SDOH — ECONOMIC STABILITY: INCOME INSECURITY: HOW HARD IS IT FOR YOU TO PAY FOR THE VERY BASICS LIKE FOOD, HOUSING, MEDICAL CARE, AND HEATING?: HARD

## 2023-05-24 NOTE — PROGRESS NOTES
Angie Mcgarry III (:  1980) is a 36 y.o. male,Established patient, here for evaluation of the following chief complaint(s):  Medication Refill and Follow-up         ASSESSMENT/PLAN:  1. Attention deficit hyperactivity disorder (ADHD), combined type  -     amphetamine-dextroamphetamine (ADDERALL) 20 MG tablet; Take 1 tablet by mouth 2 times daily for 30 days. Max Daily Amount: 40 mg, Disp-60 tablet, R-0Normal  -     10-Drug Screen W/Conf, Urine; Future      No follow-ups on file. Subjective   SUBJECTIVE/OBJECTIVE:  HPI In for medication followup. Trying to get mothers house fixed up. She plans to sell this house and move in with her dad. Keeping busy doing this. Some itching. Says that nerves have been somewhat stressed. Takes methadone regularly. Review of Systems       Objective   Physical Exam  Cardiovascular:      Rate and Rhythm: Normal rate and regular rhythm. Heart sounds: Normal heart sounds. No murmur heard. Pulmonary:      Effort: Pulmonary effort is normal.      Breath sounds: Normal breath sounds. Abdominal:      General: Abdomen is flat. Bowel sounds are normal.      Palpations: Abdomen is soft. Musculoskeletal:      Right lower leg: No edema. Left lower leg: No edema. An electronic signature was used to authenticate this note.     --Amy De La Paz MD

## 2023-05-24 NOTE — PROGRESS NOTES
Chief Complaint   Patient presents with    Medication Refill    Follow-up     Patient goes to methadone clinic. Takes methadone 75 mg once daily    1. \"Have you been to the ER, urgent care clinic since your last visit? Hospitalized since your last visit? \" no    2. \"Have you seen or consulted any other health care providers outside of the [address] [place] [address] since your last visit? \" Methadone clinic     3. For patients aged 39-70: Has the patient had a colonoscopy / FIT/ Cologuard? yes      If the patient is female:    4. For patients aged 41-77: Has the patient had a mammogram within the past 2 years? na      5.  For patients aged 21-65: Has the patient had a pap smear? na      Health Maintenance Due   Topic Date Due    Hepatitis A vaccine (1 of 2 - Risk 2-dose series) Never done    Pneumococcal 0-64 years Vaccine (1 - PCV) Never done    Hepatitis B vaccine (1 of 3 - Risk 3-dose series) Never done    Lipids  Never done    COVID-19 Vaccine (3 - Booster for Pfizer series) 08/07/2021

## 2023-05-27 LAB
BARBITURATES UR QL SCN: NEGATIVE NG/ML
BENZODIAZ UR QL SCN: NEGATIVE NG/ML
BZE UR QL SCN: NEGATIVE NG/ML
CREAT UR-MCNC: 158.5 MG/DL (ref 20–300)
LABORATORY COMMENT REPORT: NORMAL
OPIATES UR QL SCN: NEGATIVE NG/ML
OXYCODONE+OXYMORPHONE UR QL SCN: NEGATIVE NG/ML
PCP UR QL: NEGATIVE NG/ML
PH UR: 5.7 (ref 4.5–8.9)
PROPOXYPH UR QL SCN: NEGATIVE NG/ML

## 2023-06-03 LAB
AMPHETAMINE URINE CONFIRM: >3000 NG/ML
AMPHETAMINE, URINE: POSITIVE
AMPHETAMINES UR QL SCN: NORMAL NG/ML
AMPHETAMINES, URINE: POSITIVE
BARBITURATES UR QL SCN: NEGATIVE NG/ML
BENZODIAZ UR QL SCN: NEGATIVE NG/ML
BZE UR QL SCN: NEGATIVE NG/ML
CANNABINOIDS CONF, URINE: 6525 NG/ML
CANNABINOIDS UR QL SCN: NORMAL NG/ML
CANNABINOIDS, URINE: POSITIVE
CREAT UR-MCNC: 158.5 MG/DL (ref 20–300)
LABORATORY COMMENT REPORT: NORMAL
METHADONE UR QL SCN: NORMAL NG/ML
METHAMPHETAMINE, URINE: NEGATIVE
OPIATES UR QL SCN: NEGATIVE NG/ML
OXYCODONE+OXYMORPHONE UR QL SCN: NEGATIVE NG/ML
PCP UR QL: NEGATIVE NG/ML
PH UR: 5.7 (ref 4.5–8.9)
PROPOXYPH UR QL SCN: NEGATIVE NG/ML

## 2023-06-05 ENCOUNTER — TELEPHONE (OUTPATIENT)
Age: 43
End: 2023-06-05

## 2023-06-27 DIAGNOSIS — F90.2 ATTENTION DEFICIT HYPERACTIVITY DISORDER (ADHD), COMBINED TYPE: ICD-10-CM

## 2023-06-27 RX ORDER — DEXTROAMPHETAMINE SACCHARATE, AMPHETAMINE ASPARTATE, DEXTROAMPHETAMINE SULFATE AND AMPHETAMINE SULFATE 5; 5; 5; 5 MG/1; MG/1; MG/1; MG/1
20 TABLET ORAL 2 TIMES DAILY
Qty: 60 TABLET | Refills: 0 | Status: SHIPPED | OUTPATIENT
Start: 2023-06-27 | End: 2023-07-27

## 2023-07-18 RX ORDER — PANTOPRAZOLE SODIUM 40 MG/1
TABLET, DELAYED RELEASE ORAL
Qty: 90 TABLET | Refills: 1 | Status: SHIPPED | OUTPATIENT
Start: 2023-07-18

## 2023-07-18 NOTE — TELEPHONE ENCOUNTER
Last appointment: 5/24/23  Next appointment: 8/24/23  Previous refill encounter(s): 12/14/22 #90 with 1 refill    Requested Prescriptions     Pending Prescriptions Disp Refills    pantoprazole (PROTONIX) 40 MG tablet [Pharmacy Med Name: PANTOPRAZOLE SOD DR 40 MG TAB] 90 tablet 1     Sig: TAKE 1 TABLET BY MOUTH DAILY. FOR STOMACH PAIN         For Pharmacy Admin Tracking Only    Program: Medication Refill  CPA in place:    Recommendation Provided To:    Intervention Detail: New Rx: 1, reason: Patient Preference  Intervention Accepted By:   Mancel Seats Closed?:    Time Spent (min): 5

## 2023-07-25 DIAGNOSIS — F90.2 ATTENTION DEFICIT HYPERACTIVITY DISORDER (ADHD), COMBINED TYPE: ICD-10-CM

## 2023-07-25 RX ORDER — DEXTROAMPHETAMINE SACCHARATE, AMPHETAMINE ASPARTATE, DEXTROAMPHETAMINE SULFATE AND AMPHETAMINE SULFATE 5; 5; 5; 5 MG/1; MG/1; MG/1; MG/1
20 TABLET ORAL 2 TIMES DAILY
Qty: 60 TABLET | Refills: 0 | Status: SHIPPED | OUTPATIENT
Start: 2023-07-25 | End: 2023-08-24

## 2023-07-25 NOTE — TELEPHONE ENCOUNTER
Requested Prescriptions     Pending Prescriptions Disp Refills    amphetamine-dextroamphetamine (ADDERALL) 20 MG tablet 60 tablet 0     Sig: Take 1 tablet by mouth 2 times daily for 30 days.  Max Daily Amount: 40 mg       LOV: 05/24/2023  NOV: 08/24/2023

## 2023-07-31 RX ORDER — DIVALPROEX SODIUM 500 MG/1
TABLET, DELAYED RELEASE ORAL
Qty: 180 TABLET | Refills: 1 | Status: SHIPPED | OUTPATIENT
Start: 2023-07-31

## 2023-08-21 ENCOUNTER — TELEPHONE (OUTPATIENT)
Age: 43
End: 2023-08-21

## 2023-08-21 NOTE — TELEPHONE ENCOUNTER
Patient wants to get the medication amphetamine-dextroamphetamine (ADDERALL) 20 MG tablet.   If any questions please give her him a call @ [phone]

## 2023-08-30 ENCOUNTER — OFFICE VISIT (OUTPATIENT)
Age: 43
End: 2023-08-30
Payer: MEDICAID

## 2023-08-30 VITALS
TEMPERATURE: 97.8 F | RESPIRATION RATE: 16 BRPM | HEART RATE: 83 BPM | WEIGHT: 152 LBS | OXYGEN SATURATION: 97 % | HEIGHT: 70 IN | DIASTOLIC BLOOD PRESSURE: 87 MMHG | BODY MASS INDEX: 21.76 KG/M2 | SYSTOLIC BLOOD PRESSURE: 139 MMHG

## 2023-08-30 DIAGNOSIS — F41.9 ANXIETY: ICD-10-CM

## 2023-08-30 DIAGNOSIS — I10 ESSENTIAL HYPERTENSION: Primary | ICD-10-CM

## 2023-08-30 DIAGNOSIS — K92.2 GASTROINTESTINAL HEMORRHAGE, UNSPECIFIED GASTROINTESTINAL HEMORRHAGE TYPE: ICD-10-CM

## 2023-08-30 DIAGNOSIS — F90.2 ATTENTION DEFICIT HYPERACTIVITY DISORDER (ADHD), COMBINED TYPE: ICD-10-CM

## 2023-08-30 DIAGNOSIS — Z82.49 FAMILY HISTORY OF EARLY CAD: ICD-10-CM

## 2023-08-30 DIAGNOSIS — F17.200 SMOKING: ICD-10-CM

## 2023-08-30 DIAGNOSIS — R07.2 PRECORDIAL PAIN: ICD-10-CM

## 2023-08-30 PROCEDURE — 99214 OFFICE O/P EST MOD 30 MIN: CPT | Performed by: FAMILY MEDICINE

## 2023-08-30 PROCEDURE — 3075F SYST BP GE 130 - 139MM HG: CPT | Performed by: FAMILY MEDICINE

## 2023-08-30 PROCEDURE — 3079F DIAST BP 80-89 MM HG: CPT | Performed by: FAMILY MEDICINE

## 2023-08-30 PROCEDURE — 93005 ELECTROCARDIOGRAM TRACING: CPT | Performed by: FAMILY MEDICINE

## 2023-08-30 PROCEDURE — 93010 ELECTROCARDIOGRAM REPORT: CPT | Performed by: FAMILY MEDICINE

## 2023-08-30 RX ORDER — PANTOPRAZOLE SODIUM 40 MG/1
40 TABLET, DELAYED RELEASE ORAL
Qty: 90 TABLET | Refills: 1 | Status: SHIPPED | OUTPATIENT
Start: 2023-08-30

## 2023-08-30 RX ORDER — ALPRAZOLAM 1 MG/1
1 TABLET ORAL 2 TIMES DAILY
Qty: 60 TABLET | Refills: 3 | Status: SHIPPED | OUTPATIENT
Start: 2023-08-30 | End: 2023-12-28

## 2023-08-30 NOTE — PROGRESS NOTES
Chief Complaint   Patient presents with    Follow-up         1. \"Have you been to the ER, urgent care clinic since your last visit? Hospitalized since your last visit? \" no    2. \"Have you seen or consulted any other health care providers outside of the [address] [place] [address] since your last visit? \" no     3. For patients aged 43-73: Has the patient had a colonoscopy / FIT/ Cologuard? N/a      If the patient is female:    4. For patients aged 43-66: Has the patient had a mammogram within the past 2 years? N/a      5. For patients aged 21-65: Has the patient had a pap smear?  N/a      Health Maintenance Due   Topic Date Due    Hepatitis A vaccine (1 of 2 - Risk 2-dose series) Never done    Pneumococcal 0-64 years Vaccine (1 - PCV) Never done    Hepatitis B vaccine (1 of 3 - Risk 3-dose series) Never done    Lipids  Never done    COVID-19 Vaccine (3 - Booster for Pfizer series) 08/07/2021    Flu vaccine (1) Never done

## 2023-08-30 NOTE — PROGRESS NOTES
Angelica Escoto III (:  1980) is a 39 y.o. male,Established patient, here for evaluation of the following chief complaint(s):  Follow-up         ASSESSMENT/PLAN:  1. Essential hypertension  2. Precordial pain  -     AMB POC EKG ROUTINE  -     ARNOLD Adler MD, Cardiology, [place]  -     Lipid Panel; Future  -     Comprehensive Metabolic Panel; Future  -     CBC with Auto Differential; Future  3. Gastrointestinal hemorrhage, unspecified gastrointestinal hemorrhage type  -     pantoprazole (PROTONIX) 40 MG tablet; Take 1 tablet by mouth every morning (before breakfast), Disp-90 tablet, R-1Normal  4. Anxiety  -     ALPRAZolam (XANAX) 1 MG tablet; Take 1 tablet by mouth in the morning and at bedtime for 120 days. Max Daily Amount: 2 mg, Disp-60 tablet, R-3Normal  5. Smoking  -     LEANDRA - ARNOLD Vargas MD, Cardiology, [place]  6. Family history of early CAD  -     LEANDRA - ARNOLD Vargas MD, Cardiology, [place]      Return in about 4 months (around 2023). Subjective   SUBJECTIVE/OBJECTIVE:  HPI Has been having throwing up blood and also pooping blood for the last 3-4 months. Some burning abdominal pain. Has also had some chest pressure for the last month, assc with numbness in hands. Chest pressure can last for one hour, worse when is under stress. Has been stressed out lately, getting ready to move. Chest pressure not related to activity. Seems to be more related to stress. Smokes 1/2 ppd. Also has strong family hx of cad. Father, uncles, grandfathers Dinora Eddy had cad. Review of Systems       Objective   Physical Exam  Cardiovascular:      Rate and Rhythm: Normal rate and regular rhythm. Heart sounds: Normal heart sounds. No murmur heard. Pulmonary:      Effort: Pulmonary effort is normal.      Breath sounds: Normal breath sounds. Abdominal:      General: Abdomen is flat. Bowel sounds are normal.      Palpations: Abdomen is soft.    Musculoskeletal:      Right lower

## 2023-08-30 NOTE — TELEPHONE ENCOUNTER
Patient mother Aziza Matthews states that Phylicia Gauthier has forgotten to fill her son  RX amphetamine-dextroamphetamine (ADDERALL) 20 MG tablet she can be reached @ [phone]

## 2023-08-31 ENCOUNTER — TELEPHONE (OUTPATIENT)
Age: 43
End: 2023-08-31

## 2023-08-31 LAB
ALBUMIN SERPL-MCNC: 4 G/DL (ref 3.5–5)
ALBUMIN/GLOB SERPL: 1.4 (ref 1.1–2.2)
ALP SERPL-CCNC: 71 U/L (ref 45–117)
ALT SERPL-CCNC: 25 U/L (ref 12–78)
ANION GAP SERPL CALC-SCNC: 3 MMOL/L (ref 5–15)
AST SERPL-CCNC: 15 U/L (ref 15–37)
BASOPHILS # BLD: 0 K/UL (ref 0–0.1)
BASOPHILS NFR BLD: 1 % (ref 0–1)
BILIRUB SERPL-MCNC: 0.2 MG/DL (ref 0.2–1)
BUN SERPL-MCNC: 13 MG/DL (ref 6–20)
BUN/CREAT SERPL: 19 (ref 12–20)
CALCIUM SERPL-MCNC: 9.1 MG/DL (ref 8.5–10.1)
CHLORIDE SERPL-SCNC: 107 MMOL/L (ref 97–108)
CHOLEST SERPL-MCNC: 217 MG/DL
CO2 SERPL-SCNC: 30 MMOL/L (ref 21–32)
CREAT SERPL-MCNC: 0.68 MG/DL (ref 0.7–1.3)
DIFFERENTIAL METHOD BLD: NORMAL
EOSINOPHIL # BLD: 0 K/UL (ref 0–0.4)
EOSINOPHIL NFR BLD: 0 % (ref 0–7)
ERYTHROCYTE [DISTWIDTH] IN BLOOD BY AUTOMATED COUNT: 13.8 % (ref 11.5–14.5)
GLOBULIN SER CALC-MCNC: 2.8 G/DL (ref 2–4)
GLUCOSE SERPL-MCNC: 74 MG/DL (ref 65–100)
HCT VFR BLD AUTO: 39.8 % (ref 36.6–50.3)
HDLC SERPL-MCNC: 38 MG/DL
HDLC SERPL: 5.7 (ref 0–5)
HGB BLD-MCNC: 12.6 G/DL (ref 12.1–17)
IMM GRANULOCYTES # BLD AUTO: 0 K/UL (ref 0–0.04)
IMM GRANULOCYTES NFR BLD AUTO: 0 % (ref 0–0.5)
LDLC SERPL CALC-MCNC: 144 MG/DL (ref 0–100)
LYMPHOCYTES # BLD: 1.8 K/UL (ref 0.8–3.5)
LYMPHOCYTES NFR BLD: 24 % (ref 12–49)
MCH RBC QN AUTO: 27.3 PG (ref 26–34)
MCHC RBC AUTO-ENTMCNC: 31.7 G/DL (ref 30–36.5)
MCV RBC AUTO: 86.3 FL (ref 80–99)
MONOCYTES # BLD: 0.4 K/UL (ref 0–1)
MONOCYTES NFR BLD: 5 % (ref 5–13)
NEUTS SEG # BLD: 5.2 K/UL (ref 1.8–8)
NEUTS SEG NFR BLD: 70 % (ref 32–75)
NRBC # BLD: 0 K/UL (ref 0–0.01)
NRBC BLD-RTO: 0 PER 100 WBC
PLATELET # BLD AUTO: 237 K/UL (ref 150–400)
PMV BLD AUTO: 10.5 FL (ref 8.9–12.9)
POTASSIUM SERPL-SCNC: 4.5 MMOL/L (ref 3.5–5.1)
PROT SERPL-MCNC: 6.8 G/DL (ref 6.4–8.2)
RBC # BLD AUTO: 4.61 M/UL (ref 4.1–5.7)
SODIUM SERPL-SCNC: 140 MMOL/L (ref 136–145)
TRIGL SERPL-MCNC: 175 MG/DL
VLDLC SERPL CALC-MCNC: 35 MG/DL
WBC # BLD AUTO: 7.4 K/UL (ref 4.1–11.1)

## 2023-08-31 RX ORDER — ESOMEPRAZOLE MAGNESIUM 40 MG/1
40 CAPSULE, DELAYED RELEASE ORAL
Qty: 90 CAPSULE | Refills: 1 | Status: SHIPPED | OUTPATIENT
Start: 2023-08-31

## 2023-08-31 RX ORDER — DEXTROAMPHETAMINE SACCHARATE, AMPHETAMINE ASPARTATE, DEXTROAMPHETAMINE SULFATE AND AMPHETAMINE SULFATE 5; 5; 5; 5 MG/1; MG/1; MG/1; MG/1
20 TABLET ORAL 2 TIMES DAILY
Qty: 60 TABLET | Refills: 0 | Status: SHIPPED | OUTPATIENT
Start: 2023-08-31 | End: 2023-10-04 | Stop reason: SDUPTHER

## 2023-08-31 NOTE — TELEPHONE ENCOUNTER
Patients mother Joyceann Frankel) calling regarding sons perscriptions    pantoprazole (PROTONIX) 40 MG tablet     Was called in and she states a different med was supposed to be called in because the protonix upsets his stomach    Pharmacy is [facility]     Best call back number is [phone]

## 2023-08-31 NOTE — TELEPHONE ENCOUNTER
Patient claims he discussed requesting new GERD message as patient states he can not tolerate dairy due to protonix. Will check with pcp for verification of new med.

## 2023-09-01 ENCOUNTER — CLINICAL DOCUMENTATION (OUTPATIENT)
Age: 43
End: 2023-09-01

## 2023-09-06 ENCOUNTER — TELEPHONE (OUTPATIENT)
Age: 43
End: 2023-09-06

## 2023-09-06 RX ORDER — ROSUVASTATIN CALCIUM 10 MG/1
10 TABLET, COATED ORAL NIGHTLY
Qty: 90 TABLET | Refills: 3 | Status: SHIPPED | OUTPATIENT
Start: 2023-09-06

## 2023-10-02 ENCOUNTER — TELEPHONE (OUTPATIENT)
Age: 43
End: 2023-10-02

## 2023-10-02 NOTE — TELEPHONE ENCOUNTER
Patient wants to get the medication amphetamine-dextroamphetamine (ADDERALL) 20 MG tablet.   If any questions please give him a call @ [phone]

## 2023-10-03 ENCOUNTER — TELEPHONE (OUTPATIENT)
Age: 43
End: 2023-10-03

## 2023-10-03 NOTE — TELEPHONE ENCOUNTER
Patient mother states that her son need a RX refill amphetamine-dextroamphetamine (ADDERALL) 20 MG tablet she can be reached @ [phone]

## 2023-10-04 DIAGNOSIS — F90.2 ATTENTION DEFICIT HYPERACTIVITY DISORDER (ADHD), COMBINED TYPE: ICD-10-CM

## 2023-10-04 RX ORDER — METHYLPREDNISOLONE 4 MG/1
TABLET ORAL
Qty: 21 TABLET | Refills: 0 | Status: SHIPPED | OUTPATIENT
Start: 2023-10-04

## 2023-10-04 RX ORDER — DEXTROAMPHETAMINE SACCHARATE, AMPHETAMINE ASPARTATE, DEXTROAMPHETAMINE SULFATE AND AMPHETAMINE SULFATE 5; 5; 5; 5 MG/1; MG/1; MG/1; MG/1
20 TABLET ORAL 2 TIMES DAILY
Qty: 60 TABLET | Refills: 0 | Status: SHIPPED | OUTPATIENT
Start: 2023-10-04 | End: 2023-11-03

## 2023-10-04 NOTE — TELEPHONE ENCOUNTER
Last appointment: 8/30/23  Next appointment: 1/2/24  Previous refill encounter(s): 5/24/23 #21    Requested Prescriptions     Pending Prescriptions Disp Refills    methylPREDNISolone (MEDROL DOSEPACK) 4 MG tablet [Pharmacy Med Name: METHYLPREDNISOLONE 4 MG DOSEPK] 21 tablet 0     Sig: TAKE 6 TABLETS ON DAY 1 AS DIRECTED ON PACKAGE AND DECREASE BY 1 TAB EACH DAY FOR A TOTAL OF 6 DAYS         For Pharmacy Admin Tracking Only    Program: Medication Refill  CPA in place:    Recommendation Provided To:    Intervention Detail: New Rx: 1, reason: Patient Preference  Intervention Accepted By:   Radha Dupont Closed?:    Time Spent (min): 5

## 2023-11-03 DIAGNOSIS — F90.2 ATTENTION DEFICIT HYPERACTIVITY DISORDER (ADHD), COMBINED TYPE: ICD-10-CM

## 2023-11-03 NOTE — TELEPHONE ENCOUNTER
Patient wants to get the medication amphetamine-dextroamphetamine (ADDERALL) 20 MG tablet.  If any questions please give her a call @ [phone]

## 2023-11-07 DIAGNOSIS — F90.2 ATTENTION DEFICIT HYPERACTIVITY DISORDER (ADHD), COMBINED TYPE: ICD-10-CM

## 2023-11-07 RX ORDER — DEXTROAMPHETAMINE SACCHARATE, AMPHETAMINE ASPARTATE, DEXTROAMPHETAMINE SULFATE AND AMPHETAMINE SULFATE 5; 5; 5; 5 MG/1; MG/1; MG/1; MG/1
20 TABLET ORAL 2 TIMES DAILY
Qty: 60 TABLET | Refills: 0 | Status: SHIPPED | OUTPATIENT
Start: 2023-11-07 | End: 2023-12-06 | Stop reason: SDUPTHER

## 2023-11-07 RX ORDER — DEXTROAMPHETAMINE SACCHARATE, AMPHETAMINE ASPARTATE, DEXTROAMPHETAMINE SULFATE AND AMPHETAMINE SULFATE 5; 5; 5; 5 MG/1; MG/1; MG/1; MG/1
20 TABLET ORAL 2 TIMES DAILY
Qty: 60 TABLET | Refills: 0 | Status: CANCELLED | OUTPATIENT
Start: 2023-11-07 | End: 2023-12-07

## 2023-11-07 NOTE — TELEPHONE ENCOUNTER
Patient mother Dayami is requesting a RX refill amphetamine-dextroamphetamine (ADDERALL) 20 MG tablet she can be reached @ [phone]

## 2023-12-06 DIAGNOSIS — F90.2 ATTENTION DEFICIT HYPERACTIVITY DISORDER (ADHD), COMBINED TYPE: ICD-10-CM

## 2023-12-06 RX ORDER — DEXTROAMPHETAMINE SACCHARATE, AMPHETAMINE ASPARTATE, DEXTROAMPHETAMINE SULFATE AND AMPHETAMINE SULFATE 5; 5; 5; 5 MG/1; MG/1; MG/1; MG/1
20 TABLET ORAL 2 TIMES DAILY
Qty: 60 TABLET | Refills: 0 | Status: SHIPPED | OUTPATIENT
Start: 2023-12-06 | End: 2024-01-10

## 2023-12-06 NOTE — TELEPHONE ENCOUNTER
Patient wants to get the medication amphetamine-dextroamphetamine (ADDERALL) 20 MG tablet .  If any questions please give him a @ [phone]

## 2023-12-13 ENCOUNTER — CLINICAL DOCUMENTATION (OUTPATIENT)
Age: 43
End: 2023-12-13

## 2024-01-05 DIAGNOSIS — F41.9 ANXIETY: ICD-10-CM

## 2024-01-05 RX ORDER — ALPRAZOLAM 1 MG/1
TABLET ORAL
Qty: 60 TABLET | Refills: 0 | Status: SHIPPED | OUTPATIENT
Start: 2024-01-05 | End: 2024-02-04

## 2024-01-05 NOTE — TELEPHONE ENCOUNTER
Patient mother Dayami states that patient need a RX refill  amphetamine-dextroamphetamine (ADDERALL) 20 MG tablet   ALPRAZolam (XANAX) 1 MG tablet she can be reached @ [phone]

## 2024-01-10 ENCOUNTER — OFFICE VISIT (OUTPATIENT)
Age: 44
End: 2024-01-10
Payer: MEDICAID

## 2024-01-10 VITALS
DIASTOLIC BLOOD PRESSURE: 87 MMHG | SYSTOLIC BLOOD PRESSURE: 129 MMHG | HEART RATE: 82 BPM | RESPIRATION RATE: 16 BRPM | HEIGHT: 70 IN | WEIGHT: 143.2 LBS | BODY MASS INDEX: 20.5 KG/M2 | OXYGEN SATURATION: 100 % | TEMPERATURE: 98 F

## 2024-01-10 DIAGNOSIS — F31.71 BIPOLAR DISORDER, IN PARTIAL REMISSION, MOST RECENT EPISODE HYPOMANIC (HCC): Primary | ICD-10-CM

## 2024-01-10 DIAGNOSIS — F90.2 ATTENTION DEFICIT HYPERACTIVITY DISORDER (ADHD), COMBINED TYPE: ICD-10-CM

## 2024-01-10 PROCEDURE — 99213 OFFICE O/P EST LOW 20 MIN: CPT | Performed by: FAMILY MEDICINE

## 2024-01-10 RX ORDER — DEXTROAMPHETAMINE SACCHARATE, AMPHETAMINE ASPARTATE, DEXTROAMPHETAMINE SULFATE AND AMPHETAMINE SULFATE 5; 5; 5; 5 MG/1; MG/1; MG/1; MG/1
20 TABLET ORAL 2 TIMES DAILY
Qty: 60 TABLET | Refills: 0 | Status: SHIPPED | OUTPATIENT
Start: 2024-01-10 | End: 2024-02-09

## 2024-01-10 RX ORDER — METHYLPREDNISOLONE 4 MG/1
TABLET ORAL
Qty: 1 KIT | Refills: 0 | Status: SHIPPED | OUTPATIENT
Start: 2024-01-10 | End: 2024-01-16

## 2024-01-10 RX ORDER — DEXTROAMPHETAMINE SACCHARATE, AMPHETAMINE ASPARTATE, DEXTROAMPHETAMINE SULFATE AND AMPHETAMINE SULFATE 5; 5; 5; 5 MG/1; MG/1; MG/1; MG/1
20 TABLET ORAL 2 TIMES DAILY
Qty: 60 TABLET | Refills: 0 | Status: CANCELLED | OUTPATIENT
Start: 2024-01-10 | End: 2024-02-09

## 2024-01-10 ASSESSMENT — PATIENT HEALTH QUESTIONNAIRE - PHQ9
SUM OF ALL RESPONSES TO PHQ QUESTIONS 1-9: 4
1. LITTLE INTEREST OR PLEASURE IN DOING THINGS: 0
2. FEELING DOWN, DEPRESSED OR HOPELESS: 0
6. FEELING BAD ABOUT YOURSELF - OR THAT YOU ARE A FAILURE OR HAVE LET YOURSELF OR YOUR FAMILY DOWN: 0
SUM OF ALL RESPONSES TO PHQ QUESTIONS 1-9: 4
SUM OF ALL RESPONSES TO PHQ QUESTIONS 1-9: 4
5. POOR APPETITE OR OVEREATING: 2
7. TROUBLE CONCENTRATING ON THINGS, SUCH AS READING THE NEWSPAPER OR WATCHING TELEVISION: 0
SUM OF ALL RESPONSES TO PHQ9 QUESTIONS 1 & 2: 0
10. IF YOU CHECKED OFF ANY PROBLEMS, HOW DIFFICULT HAVE THESE PROBLEMS MADE IT FOR YOU TO DO YOUR WORK, TAKE CARE OF THINGS AT HOME, OR GET ALONG WITH OTHER PEOPLE: 1
4. FEELING TIRED OR HAVING LITTLE ENERGY: 0
9. THOUGHTS THAT YOU WOULD BE BETTER OFF DEAD, OR OF HURTING YOURSELF: 0
3. TROUBLE FALLING OR STAYING ASLEEP: 2
8. MOVING OR SPEAKING SO SLOWLY THAT OTHER PEOPLE COULD HAVE NOTICED. OR THE OPPOSITE, BEING SO FIGETY OR RESTLESS THAT YOU HAVE BEEN MOVING AROUND A LOT MORE THAN USUAL: 0
SUM OF ALL RESPONSES TO PHQ QUESTIONS 1-9: 4

## 2024-01-10 NOTE — PROGRESS NOTES
Chief Complaint   Patient presents with    Medication Refill     Six month follow up      Poison Ivy         1. \"Have you been to the ER, urgent care clinic since your last visit?  Hospitalized since your last visit?\" NO    2. \"Have you seen or consulted any other health care providers outside of the [facility] since your last visit?\" NO     3. For patients aged 45-75: Has the patient had a colonoscopy / FIT/ Cologuard? N/A      If the patient is female:    4. For patients aged 40-74: Has the patient had a mammogram within the past 2 years? N/A      5. For patients aged 21-65: Has the patient had a pap smear? N/A      Health Maintenance Due   Topic Date Due    Hepatitis B vaccine (1 of 3 - 3-dose series) Never done    Pneumococcal 0-64 years Vaccine (1 - PCV) Never done    Hepatitis A vaccine (1 of 2 - Risk 2-dose series) Never done    Flu vaccine (1) Never done    COVID-19 Vaccine (3 - 2023-24 season) 09/01/2023

## 2024-01-10 NOTE — PROGRESS NOTES
Hao Villarreal III (:  1980) is a 43 y.o. male,Established patient, here for evaluation of the following chief complaint(s):  Medication Refill (Six month follow up/) and Poison Ivy         ASSESSMENT/PLAN:  1. Bipolar disorder, in partial remission, most recent episode hypomanic (HCC)  2. Attention deficit hyperactivity disorder (ADHD), combined type  -     amphetamine-dextroamphetamine (ADDERALL, 20MG,) 20 MG tablet; Take 1 tablet by mouth 2 times daily for 30 days. Max Daily Amount: 40 mg, Disp-60 tablet, R-0Normal      Return in about 6 months (around 7/10/2024).     Unfortunately unable to tolerate meds for bipolar    Subjective   SUBJECTIVE/OBJECTIVE:  HPI living with mother . She is about ready to sell her house and move in with her parents. Pt. Not sure where he is going to go. Needs a refill on adderall. Also has a pruritic rash. Seroquel- made him too sleepy. Couldn't function on lithium. Depakote- made him very irritable. Says that adderall helps him focus during the day.     Review of Systems       Objective   Physical Exam  Cardiovascular:      Rate and Rhythm: Normal rate and regular rhythm.      Heart sounds: Normal heart sounds. No murmur heard.  Pulmonary:      Effort: Pulmonary effort is normal.      Breath sounds: Normal breath sounds.   Abdominal:      General: Abdomen is flat. Bowel sounds are normal.      Palpations: Abdomen is soft.   Musculoskeletal:      Right lower leg: No edema.      Left lower leg: No edema.                An electronic signature was used to authenticate this note.    --Charles Shaffer MD

## 2024-02-05 DIAGNOSIS — F41.9 ANXIETY: ICD-10-CM

## 2024-02-05 DIAGNOSIS — F90.2 ATTENTION DEFICIT HYPERACTIVITY DISORDER (ADHD), COMBINED TYPE: ICD-10-CM

## 2024-02-05 RX ORDER — DEXTROAMPHETAMINE SACCHARATE, AMPHETAMINE ASPARTATE, DEXTROAMPHETAMINE SULFATE AND AMPHETAMINE SULFATE 5; 5; 5; 5 MG/1; MG/1; MG/1; MG/1
20 TABLET ORAL 2 TIMES DAILY
Qty: 60 TABLET | Refills: 0 | Status: SHIPPED | OUTPATIENT
Start: 2024-02-05 | End: 2024-03-06

## 2024-02-05 RX ORDER — ALPRAZOLAM 1 MG/1
TABLET ORAL
Qty: 60 TABLET | Refills: 0 | Status: SHIPPED | OUTPATIENT
Start: 2024-02-05 | End: 2024-03-05

## 2024-02-05 NOTE — TELEPHONE ENCOUNTER
Patient mother Dayami is requesting RX refill ALPRAZolam (XANAX) 1 MG tablet   amphetamine-dextroamphetamine (ADDERALL, 20MG,) 20 MG tablet she can be reached @ [phone]

## 2024-02-08 ENCOUNTER — CLINICAL DOCUMENTATION (OUTPATIENT)
Age: 44
End: 2024-02-08

## 2024-02-08 NOTE — PROGRESS NOTES
Floyd No LPN  Licensed Nurse     Progress Notes      Addendum     Encounter Date: 9/1/2023       TORRI WILD sent to [facility] for Adderall 20 mg, PA was denied  The plan covers members when the member meets all of the Diagnostic and Statistical Manual of Mental Disorders, 5th edition (DSM-V) criteria for ADHD in more than 1 setting      Electronically signed by Floyd No LPN at 9/1/2023 11:52 AM  Electronically signed by Floyd No LPN at 9/19/2023  9:03 AM         Clinical Documentation on 9/1/2023            Revision History            Detailed Report          Note shared with patient  Additional Documentation    Encounter Info: Billing Info,     History,     Allergies,     Detailed Report     Progress Notes Info    Author Note Status Last Update User Last Update Date/Time   Floyd No LPN Addendum Floyd No LPN 9/19/2023  9:03 AM     Chart Review Routing History    No routing history on file.  Orders Placed    None  Medication Changes      None  Medication List  Visit Diagnoses      None  Problem List

## 2024-03-05 DIAGNOSIS — F41.9 ANXIETY: ICD-10-CM

## 2024-03-05 DIAGNOSIS — F90.2 ATTENTION DEFICIT HYPERACTIVITY DISORDER (ADHD), COMBINED TYPE: ICD-10-CM

## 2024-03-05 NOTE — TELEPHONE ENCOUNTER
Patient's Mom Dayami requesting a refill on Adderall 20 mg and Xanax 1 mg sent to [facility]  [address]. Dayami can be reached at [phone]

## 2024-03-05 NOTE — TELEPHONE ENCOUNTER
LAST FILL DATES 2/5/24.Last office visit 1/10/24.  Next office visit 7/10/24. NEEDS CONTRACT UPDATE.

## 2024-03-06 ENCOUNTER — TELEPHONE (OUTPATIENT)
Age: 44
End: 2024-03-06

## 2024-03-06 RX ORDER — DEXTROAMPHETAMINE SACCHARATE, AMPHETAMINE ASPARTATE, DEXTROAMPHETAMINE SULFATE AND AMPHETAMINE SULFATE 5; 5; 5; 5 MG/1; MG/1; MG/1; MG/1
20 TABLET ORAL 2 TIMES DAILY
Qty: 60 TABLET | Refills: 0 | Status: SHIPPED | OUTPATIENT
Start: 2024-03-06 | End: 2024-04-05

## 2024-03-06 RX ORDER — ALPRAZOLAM 1 MG/1
TABLET ORAL
Qty: 60 TABLET | Refills: 0 | Status: SHIPPED | OUTPATIENT
Start: 2024-03-06 | End: 2024-04-03

## 2024-03-06 NOTE — TELEPHONE ENCOUNTER
Patient mother Dayami is requesting a RX refill  amphetamine-dextroamphetamine (ADDERALL, 20MG,) 20 MG tablet   ALPRAZolam (XANAX) 1 MG tablet she can be reached @[phone]

## 2024-03-07 DIAGNOSIS — F90.2 ATTENTION DEFICIT HYPERACTIVITY DISORDER (ADHD), COMBINED TYPE: ICD-10-CM

## 2024-03-07 DIAGNOSIS — F41.9 ANXIETY: ICD-10-CM

## 2024-03-07 RX ORDER — DEXTROAMPHETAMINE SACCHARATE, AMPHETAMINE ASPARTATE, DEXTROAMPHETAMINE SULFATE AND AMPHETAMINE SULFATE 5; 5; 5; 5 MG/1; MG/1; MG/1; MG/1
20 TABLET ORAL 2 TIMES DAILY
Qty: 60 TABLET | Refills: 0 | OUTPATIENT
Start: 2024-03-07 | End: 2024-04-06

## 2024-03-07 RX ORDER — ALPRAZOLAM 1 MG/1
TABLET ORAL
Qty: 60 TABLET | Refills: 0 | OUTPATIENT
Start: 2024-03-07 | End: 2024-04-04

## 2024-04-08 ENCOUNTER — TELEPHONE (OUTPATIENT)
Age: 44
End: 2024-04-08

## 2024-04-08 NOTE — TELEPHONE ENCOUNTER
Patient's Mom Dayami called requesting a refill on Adderall 20 mg, and Alprazolam 1 mg sent to [facility] #[id number]. Dayami can be reached at [phone].

## 2024-04-09 DIAGNOSIS — F41.9 ANXIETY: Primary | ICD-10-CM

## 2024-04-09 DIAGNOSIS — F90.2 ATTENTION DEFICIT HYPERACTIVITY DISORDER (ADHD), COMBINED TYPE: ICD-10-CM

## 2024-04-09 RX ORDER — ALPRAZOLAM 1 MG/1
1 TABLET ORAL 2 TIMES DAILY
Qty: 60 TABLET | Refills: 0 | Status: SHIPPED | OUTPATIENT
Start: 2024-04-09 | End: 2024-05-09

## 2024-04-09 RX ORDER — DEXTROAMPHETAMINE SACCHARATE, AMPHETAMINE ASPARTATE, DEXTROAMPHETAMINE SULFATE AND AMPHETAMINE SULFATE 5; 5; 5; 5 MG/1; MG/1; MG/1; MG/1
20 TABLET ORAL 2 TIMES DAILY
Qty: 60 TABLET | Refills: 0 | Status: SHIPPED | OUTPATIENT
Start: 2024-04-09 | End: 2024-05-09

## 2024-05-07 ENCOUNTER — TELEPHONE (OUTPATIENT)
Age: 44
End: 2024-05-07

## 2024-05-07 NOTE — TELEPHONE ENCOUNTER
Patient mother Dayami is requesting a RX refill  amphetamine-dextroamphetamine (ADDERALL, 20MG,) 20 MG tablet    ALPRAZolam (XANAX) 1 MG tablet she can be reached @ [phone]

## 2024-05-08 DIAGNOSIS — F41.9 ANXIETY: ICD-10-CM

## 2024-05-08 RX ORDER — ALPRAZOLAM 1 MG/1
1 TABLET ORAL 2 TIMES DAILY
Qty: 60 TABLET | Refills: 0 | Status: SHIPPED | OUTPATIENT
Start: 2024-05-08 | End: 2024-06-07

## 2024-05-09 DIAGNOSIS — F90.2 ATTENTION DEFICIT HYPERACTIVITY DISORDER (ADHD), COMBINED TYPE: ICD-10-CM

## 2024-05-09 RX ORDER — DEXTROAMPHETAMINE SACCHARATE, AMPHETAMINE ASPARTATE, DEXTROAMPHETAMINE SULFATE AND AMPHETAMINE SULFATE 5; 5; 5; 5 MG/1; MG/1; MG/1; MG/1
20 TABLET ORAL 2 TIMES DAILY
Qty: 60 TABLET | Refills: 0 | Status: SHIPPED | OUTPATIENT
Start: 2024-05-09 | End: 2024-06-08

## 2024-05-09 NOTE — TELEPHONE ENCOUNTER
Patient's Brittanei Stock would like a call back at [phone] to discuss  the reason  why Adderall prescription was not sent to [facility] Fax #[phone].

## 2024-06-10 DIAGNOSIS — F90.2 ATTENTION DEFICIT HYPERACTIVITY DISORDER (ADHD), COMBINED TYPE: ICD-10-CM

## 2024-06-10 RX ORDER — DEXTROAMPHETAMINE SACCHARATE, AMPHETAMINE ASPARTATE, DEXTROAMPHETAMINE SULFATE AND AMPHETAMINE SULFATE 5; 5; 5; 5 MG/1; MG/1; MG/1; MG/1
20 TABLET ORAL 2 TIMES DAILY
Qty: 60 TABLET | Refills: 0 | Status: SHIPPED | OUTPATIENT
Start: 2024-06-10 | End: 2024-07-10

## 2024-06-10 NOTE — TELEPHONE ENCOUNTER
Patient wants to get the medication amphetamine-dextroamphetamine (ADDERALL, 20MG,) 20 MG tablet .  If any questions please give him a call @ [phone]

## 2024-06-11 DIAGNOSIS — F41.9 ANXIETY: ICD-10-CM

## 2024-06-11 DIAGNOSIS — F41.9 ANXIETY: Primary | ICD-10-CM

## 2024-06-11 RX ORDER — ALPRAZOLAM 1 MG/1
1 TABLET ORAL 2 TIMES DAILY
Qty: 60 TABLET | Refills: 0 | OUTPATIENT
Start: 2024-06-11 | End: 2024-07-11

## 2024-06-11 RX ORDER — ALPRAZOLAM 1 MG/1
1 TABLET ORAL EVERY 12 HOURS PRN
Qty: 60 TABLET | Refills: 1 | Status: SHIPPED | OUTPATIENT
Start: 2024-06-11 | End: 2024-07-10

## 2024-06-11 NOTE — TELEPHONE ENCOUNTER
Patient mother Dayami states that her son need a RX refill  ALPRAZolam (XANAX) 1 MG tablet she can be reached @ [phone]

## 2024-07-10 ENCOUNTER — OFFICE VISIT (OUTPATIENT)
Age: 44
End: 2024-07-10
Payer: MEDICAID

## 2024-07-10 VITALS
DIASTOLIC BLOOD PRESSURE: 110 MMHG | RESPIRATION RATE: 21 BRPM | BODY MASS INDEX: 20.44 KG/M2 | HEIGHT: 69 IN | SYSTOLIC BLOOD PRESSURE: 150 MMHG | OXYGEN SATURATION: 98 % | WEIGHT: 138 LBS | TEMPERATURE: 98.2 F | HEART RATE: 77 BPM

## 2024-07-10 DIAGNOSIS — A60.01 HERPES SIMPLEX INFECTION OF PENIS: Primary | ICD-10-CM

## 2024-07-10 DIAGNOSIS — Z20.2 STD EXPOSURE: ICD-10-CM

## 2024-07-10 DIAGNOSIS — F41.9 ANXIETY: ICD-10-CM

## 2024-07-10 DIAGNOSIS — F90.2 ATTENTION DEFICIT HYPERACTIVITY DISORDER (ADHD), COMBINED TYPE: ICD-10-CM

## 2024-07-10 LAB
HIV 1+2 AB+HIV1 P24 AG SERPL QL IA: NONREACTIVE
HIV 1/2 RESULT COMMENT: NORMAL

## 2024-07-10 PROCEDURE — 99214 OFFICE O/P EST MOD 30 MIN: CPT | Performed by: FAMILY MEDICINE

## 2024-07-10 RX ORDER — VALACYCLOVIR HYDROCHLORIDE 1 G/1
1000 TABLET, FILM COATED ORAL DAILY
Qty: 5 TABLET | Refills: 5 | Status: SHIPPED | OUTPATIENT
Start: 2024-07-10 | End: 2024-07-15

## 2024-07-10 RX ORDER — ROSUVASTATIN CALCIUM 10 MG/1
10 TABLET, COATED ORAL NIGHTLY
Qty: 90 TABLET | Refills: 3 | Status: CANCELLED | OUTPATIENT
Start: 2024-07-10

## 2024-07-10 RX ORDER — DEXTROAMPHETAMINE SACCHARATE, AMPHETAMINE ASPARTATE, DEXTROAMPHETAMINE SULFATE AND AMPHETAMINE SULFATE 5; 5; 5; 5 MG/1; MG/1; MG/1; MG/1
20 TABLET ORAL 2 TIMES DAILY
Qty: 60 TABLET | Refills: 0 | Status: SHIPPED | OUTPATIENT
Start: 2024-07-10 | End: 2024-08-09

## 2024-07-10 RX ORDER — ALPRAZOLAM 1 MG/1
1 TABLET ORAL 3 TIMES DAILY PRN
Qty: 90 TABLET | Refills: 2 | Status: SHIPPED | OUTPATIENT
Start: 2024-07-10 | End: 2024-10-08

## 2024-07-10 SDOH — ECONOMIC STABILITY: INCOME INSECURITY: HOW HARD IS IT FOR YOU TO PAY FOR THE VERY BASICS LIKE FOOD, HOUSING, MEDICAL CARE, AND HEATING?: NOT HARD AT ALL

## 2024-07-10 SDOH — ECONOMIC STABILITY: FOOD INSECURITY: WITHIN THE PAST 12 MONTHS, THE FOOD YOU BOUGHT JUST DIDN'T LAST AND YOU DIDN'T HAVE MONEY TO GET MORE.: NEVER TRUE

## 2024-07-10 SDOH — ECONOMIC STABILITY: FOOD INSECURITY: WITHIN THE PAST 12 MONTHS, YOU WORRIED THAT YOUR FOOD WOULD RUN OUT BEFORE YOU GOT MONEY TO BUY MORE.: NEVER TRUE

## 2024-07-10 ASSESSMENT — PATIENT HEALTH QUESTIONNAIRE - PHQ9
1. LITTLE INTEREST OR PLEASURE IN DOING THINGS: NEARLY EVERY DAY
SUM OF ALL RESPONSES TO PHQ QUESTIONS 1-9: 20
SUM OF ALL RESPONSES TO PHQ QUESTIONS 1-9: 20
SUM OF ALL RESPONSES TO PHQ9 QUESTIONS 1 & 2: 6
4. FEELING TIRED OR HAVING LITTLE ENERGY: NEARLY EVERY DAY
10. IF YOU CHECKED OFF ANY PROBLEMS, HOW DIFFICULT HAVE THESE PROBLEMS MADE IT FOR YOU TO DO YOUR WORK, TAKE CARE OF THINGS AT HOME, OR GET ALONG WITH OTHER PEOPLE: EXTREMELY DIFFICULT
7. TROUBLE CONCENTRATING ON THINGS, SUCH AS READING THE NEWSPAPER OR WATCHING TELEVISION: NOT AT ALL
5. POOR APPETITE OR OVEREATING: NEARLY EVERY DAY
SUM OF ALL RESPONSES TO PHQ QUESTIONS 1-9: 20
8. MOVING OR SPEAKING SO SLOWLY THAT OTHER PEOPLE COULD HAVE NOTICED. OR THE OPPOSITE, BEING SO FIGETY OR RESTLESS THAT YOU HAVE BEEN MOVING AROUND A LOT MORE THAN USUAL: MORE THAN HALF THE DAYS
3. TROUBLE FALLING OR STAYING ASLEEP: NEARLY EVERY DAY
SUM OF ALL RESPONSES TO PHQ QUESTIONS 1-9: 20
6. FEELING BAD ABOUT YOURSELF - OR THAT YOU ARE A FAILURE OR HAVE LET YOURSELF OR YOUR FAMILY DOWN: NEARLY EVERY DAY
9. THOUGHTS THAT YOU WOULD BE BETTER OFF DEAD, OR OF HURTING YOURSELF: NOT AT ALL
2. FEELING DOWN, DEPRESSED OR HOPELESS: NEARLY EVERY DAY

## 2024-07-10 ASSESSMENT — COLUMBIA-SUICIDE SEVERITY RATING SCALE - C-SSRS
2. HAVE YOU ACTUALLY HAD ANY THOUGHTS OF KILLING YOURSELF?: NO
6. HAVE YOU EVER DONE ANYTHING, STARTED TO DO ANYTHING, OR PREPARED TO DO ANYTHING TO END YOUR LIFE?: NO
1. WITHIN THE PAST MONTH, HAVE YOU WISHED YOU WERE DEAD OR WISHED YOU COULD GO TO SLEEP AND NOT WAKE UP?: NO

## 2024-07-10 NOTE — PROGRESS NOTES
Hao Villarreal III (:  1980) is a 44 y.o. male,Established patient, here for evaluation of the following chief complaint(s):  6 Month Follow-Up (ADHD/Bipolar)      Assessment & Plan   1. Herpes simplex infection of penis  -     valACYclovir (VALTREX) 1 g tablet; Take 1 tablet by mouth daily for 5 days For herpes flareup, Disp-5 tablet, R-5Normal  2. Attention deficit hyperactivity disorder (ADHD), combined type  -     amphetamine-dextroamphetamine (ADDERALL, 20MG,) 20 MG tablet; Take 1 tablet by mouth 2 times daily for 30 days. Max Daily Amount: 40 mg, Disp-60 tablet, R-0Normal  3. Anxiety  -     ALPRAZolam (XANAX) 1 MG tablet; Take 1 tablet by mouth 3 times daily as needed for Sleep or Anxiety for up to 90 days. Max Daily Amount: 3 mg, Disp-90 tablet, R-2Normal  4. STD exposure  -     HIV 1/2 Ag/Ab, 4TH Generation,W Rflx Confirm; Future      No follow-ups on file.       Subjective   HPI thinks that may have contracted herpes from girlfriend. Also thinks that has poison ivy. Had some painful blisters in genital area 1 1/2 weeks ago. Curently living with his mother. She is moving in with her father, who has health problems. Pt has been working on house, trying to get it ready to be sold. Says that has been stressed out a lot. Thinks that has gotten immune to xanax. Has also been getting migraine headaches. Has had 2 severe headaches in last 2 months. Some assc nausea and vomiting. Needs refill of adderall for ADD    Review of Systems       Objective   Physical Exam  Cardiovascular:      Rate and Rhythm: Normal rate and regular rhythm.      Heart sounds: Normal heart sounds. No murmur heard.  Pulmonary:      Effort: Pulmonary effort is normal.      Breath sounds: Normal breath sounds.   Abdominal:      General: Abdomen is flat. Bowel sounds are normal.      Palpations: Abdomen is soft.   Genitourinary:     Comments: 2-3 scabbed over lesions in genital area.   Musculoskeletal:      Right lower leg: No edema.

## 2024-07-10 NOTE — PROGRESS NOTES
Chief Complaint   Patient presents with    6 Month Follow-Up     ADHD/Bipolar       \"Have you been to the ER, urgent care clinic since your last visit?  Hospitalized since your last visit?\"    NO    “Have you seen or consulted any other health care providers outside of [facility] since your last visit?”    NO            Vitals:    07/10/24 1005 07/10/24 1012   BP: (!) 154/104 (!) 150/110   Site: Left Upper Arm    Position: Sitting    Cuff Size: Large Adult    Pulse: 77    Resp: 21    Temp: 98.2 °F (36.8 °C)    TempSrc: Temporal    SpO2: 98%    Weight: 62.6 kg (138 lb)    Height: 1.753 m (5' 9\")            Health Maintenance Due   Topic Date Due    Hepatitis B vaccine (1 of 3 - 3-dose series) Never done    Pneumococcal 0-64 years Vaccine (1 of 2 - PCV) Never done    Hepatitis A vaccine (1 of 2 - Risk 2-dose series) Never done    COVID-19 Vaccine (3 - 2023-24 season) 2023      The patient, Hao Villarreal III, identity was verified by name and .

## 2024-08-13 DIAGNOSIS — F90.2 ATTENTION DEFICIT HYPERACTIVITY DISORDER (ADHD), COMBINED TYPE: ICD-10-CM

## 2024-08-13 NOTE — TELEPHONE ENCOUNTER
Last appointment: 7/10/24  Next appointment: 10/11/24  Previous refill encounter(s): 7/10/24 #60    Requested Prescriptions     Pending Prescriptions Disp Refills    amphetamine-dextroamphetamine (ADDERALL, 20MG,) 20 MG tablet 60 tablet 0     Sig: Take 1 tablet by mouth 2 times daily for 30 days. Max Daily Amount: 40 mg         For Pharmacy Admin Tracking Only    Program: Medication Refill  CPA in place:    Recommendation Provided To:   Intervention Detail: New Rx: 1, reason: Patient Preference  Intervention Accepted By:   Gap Closed?:    Time Spent (min): 5

## 2024-08-14 RX ORDER — DEXTROAMPHETAMINE SACCHARATE, AMPHETAMINE ASPARTATE, DEXTROAMPHETAMINE SULFATE AND AMPHETAMINE SULFATE 5; 5; 5; 5 MG/1; MG/1; MG/1; MG/1
20 TABLET ORAL 2 TIMES DAILY
Qty: 60 TABLET | Refills: 0 | Status: SHIPPED | OUTPATIENT
Start: 2024-08-14 | End: 2024-09-13

## 2024-09-04 DIAGNOSIS — F90.2 ATTENTION DEFICIT HYPERACTIVITY DISORDER (ADHD), COMBINED TYPE: ICD-10-CM

## 2024-09-04 RX ORDER — DEXTROAMPHETAMINE SACCHARATE, AMPHETAMINE ASPARTATE, DEXTROAMPHETAMINE SULFATE AND AMPHETAMINE SULFATE 5; 5; 5; 5 MG/1; MG/1; MG/1; MG/1
20 TABLET ORAL 2 TIMES DAILY
Qty: 60 TABLET | Refills: 0 | Status: SHIPPED | OUTPATIENT
Start: 2024-09-04 | End: 2024-10-04

## 2024-09-04 NOTE — TELEPHONE ENCOUNTER
Last appointment: 7/10/24  Next appointment: 10/11/24  Previous refill encounter(s): 8/14/24 #60    Requested Prescriptions     Pending Prescriptions Disp Refills    amphetamine-dextroamphetamine (ADDERALL, 20MG,) 20 MG tablet 60 tablet 0     Sig: Take 1 tablet by mouth 2 times daily for 30 days. Max Daily Amount: 40 mg         For Pharmacy Admin Tracking Only    Program: Medication Refill  CPA in place:    Recommendation Provided To:   Intervention Detail: New Rx: 1, reason: Patient Preference  Intervention Accepted By:   Gap Closed?:    Time Spent (min): 5

## 2024-10-11 ENCOUNTER — ANCILLARY PROCEDURE (OUTPATIENT)
Age: 44
End: 2024-10-11
Payer: MEDICAID

## 2024-10-11 ENCOUNTER — OFFICE VISIT (OUTPATIENT)
Age: 44
End: 2024-10-11
Payer: MEDICAID

## 2024-10-11 VITALS
OXYGEN SATURATION: 98 % | DIASTOLIC BLOOD PRESSURE: 90 MMHG | HEIGHT: 70 IN | SYSTOLIC BLOOD PRESSURE: 140 MMHG | RESPIRATION RATE: 16 BRPM | TEMPERATURE: 97.6 F | HEART RATE: 87 BPM | BODY MASS INDEX: 20.33 KG/M2 | WEIGHT: 142 LBS

## 2024-10-11 DIAGNOSIS — S19.9XXD INJURY OF NECK, SUBSEQUENT ENCOUNTER: ICD-10-CM

## 2024-10-11 DIAGNOSIS — F90.2 ATTENTION DEFICIT HYPERACTIVITY DISORDER (ADHD), COMBINED TYPE: ICD-10-CM

## 2024-10-11 DIAGNOSIS — S19.9XXD INJURY OF NECK, SUBSEQUENT ENCOUNTER: Primary | ICD-10-CM

## 2024-10-11 DIAGNOSIS — F41.9 ANXIETY: ICD-10-CM

## 2024-10-11 PROCEDURE — 72050 X-RAY EXAM NECK SPINE 4/5VWS: CPT | Performed by: FAMILY MEDICINE

## 2024-10-11 PROCEDURE — 99214 OFFICE O/P EST MOD 30 MIN: CPT | Performed by: FAMILY MEDICINE

## 2024-10-11 RX ORDER — SILDENAFIL 100 MG/1
TABLET, FILM COATED ORAL
Qty: 10 TABLET | Refills: 5 | Status: SHIPPED | OUTPATIENT
Start: 2024-10-11

## 2024-10-11 RX ORDER — VALACYCLOVIR HYDROCHLORIDE 1 G/1
1000 TABLET, FILM COATED ORAL DAILY
Qty: 5 TABLET | Refills: 5 | Status: SHIPPED | OUTPATIENT
Start: 2024-10-11 | End: 2024-10-16

## 2024-10-11 RX ORDER — ALPRAZOLAM 1 MG
1 TABLET ORAL 3 TIMES DAILY PRN
Qty: 90 TABLET | Refills: 5 | Status: SHIPPED | OUTPATIENT
Start: 2024-10-11 | End: 2025-04-09

## 2024-10-11 RX ORDER — DEXTROAMPHETAMINE SACCHARATE, AMPHETAMINE ASPARTATE, DEXTROAMPHETAMINE SULFATE AND AMPHETAMINE SULFATE 5; 5; 5; 5 MG/1; MG/1; MG/1; MG/1
20 TABLET ORAL 2 TIMES DAILY
Qty: 60 TABLET | Refills: 0 | Status: SHIPPED | OUTPATIENT
Start: 2024-10-11 | End: 2024-11-10
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
## 2024-10-11 NOTE — ASSESSMENT & PLAN NOTE
Orders:    amphetamine-dextroamphetamine (ADDERALL, 20MG,) 20 MG tablet; Take 1 tablet by mouth 2 times daily for 30 days. Max Daily Amount: 40 mg

## 2024-10-15 ENCOUNTER — CLINICAL DOCUMENTATION (OUTPATIENT)
Age: 44
End: 2024-10-15

## 2024-10-15 NOTE — PROGRESS NOTES
PA initiated for Amphetamine-Dextroamphetamine 20 mg table, denied, the information submitted does not show that you meet all of the Diagnostic Shaq of Mental Disorders, 5th Edition criteria for ADD or ADHD in more than 1 setting

## 2024-12-09 DIAGNOSIS — F41.9 ANXIETY: ICD-10-CM

## 2024-12-09 DIAGNOSIS — F90.2 ATTENTION DEFICIT HYPERACTIVITY DISORDER (ADHD), COMBINED TYPE: ICD-10-CM

## 2024-12-09 RX ORDER — DEXTROAMPHETAMINE SACCHARATE, AMPHETAMINE ASPARTATE, DEXTROAMPHETAMINE SULFATE AND AMPHETAMINE SULFATE 5; 5; 5; 5 MG/1; MG/1; MG/1; MG/1
20 TABLET ORAL 2 TIMES DAILY
Qty: 60 TABLET | Refills: 0 | Status: SHIPPED | OUTPATIENT
Start: 2024-12-09 | End: 2025-01-08

## 2024-12-09 RX ORDER — ALPRAZOLAM 1 MG/1
1 TABLET ORAL 3 TIMES DAILY PRN
Qty: 90 TABLET | Refills: 5 | OUTPATIENT
Start: 2024-12-09 | End: 2025-06-07

## 2024-12-09 NOTE — TELEPHONE ENCOUNTER
Pt is wanting to get his amphetamine-dextroamphetamine (ADDERALL, 20MG,) 20 MG tablet refilled and sent to   [facility]/pharmacy #[id number] - [place], [place] - [address] [phone] - F [phone]   He can be reached at [phone] if needed.

## 2024-12-09 NOTE — TELEPHONE ENCOUNTER
Xanax was sent on 10/11/24 for #90 with 5 refills.    Last appointment: 10/11/24  Next appointment: 1/13/25  Previous refill encounter(s): 10/11/24    Requested Prescriptions     Pending Prescriptions Disp Refills    amphetamine-dextroamphetamine (ADDERALL, 20MG,) 20 MG tablet 60 tablet 0     Sig: Take 1 tablet by mouth 2 times daily for 30 days. Max Daily Amount: 40 mg     Refused Prescriptions Disp Refills    ALPRAZolam (XANAX) 1 MG tablet 90 tablet 5     Sig: Take 1 tablet by mouth 3 times daily as needed for Sleep for up to 180 days. One po every 8 hours as needed for anxiety Max Daily Amount: 3 mg     Refused By: OLAYINKA SIMON     Reason for Refusal: Request already responded to by other means (e.g. phone or fax)         For Pharmacy Admin Tracking Only    Program: Medication Refill  CPA in place:    Recommendation Provided To:   Intervention Detail: Discontinued Rx: 1, reason: Duplicate Therapy and New Rx: 1, reason: Patient Preference  Intervention Accepted By:   Gap Closed?:    Time Spent (min): 5

## 2024-12-16 ENCOUNTER — TELEPHONE (OUTPATIENT)
Age: 44
End: 2024-12-16

## 2024-12-16 NOTE — TELEPHONE ENCOUNTER
Patient mother Dayami is requesting RX refill   amphetamine-dextroamphetamine (ADDERALL, 20MG,) 20 MG tablet for her son she can be reached @ [phone]

## 2024-12-16 NOTE — TELEPHONE ENCOUNTER
Patient mother made aware medication will be ready for  later today. Patient mother voiced understanding.

## 2025-01-13 ENCOUNTER — OFFICE VISIT (OUTPATIENT)
Age: 45
End: 2025-01-13
Payer: MEDICAID

## 2025-01-13 VITALS
OXYGEN SATURATION: 99 % | DIASTOLIC BLOOD PRESSURE: 96 MMHG | RESPIRATION RATE: 16 BRPM | SYSTOLIC BLOOD PRESSURE: 141 MMHG | WEIGHT: 150 LBS | HEIGHT: 70 IN | BODY MASS INDEX: 21.47 KG/M2 | HEART RATE: 82 BPM

## 2025-01-13 DIAGNOSIS — F19.90 SUBSTANCE USE: ICD-10-CM

## 2025-01-13 DIAGNOSIS — R53.1 WEAKNESS: ICD-10-CM

## 2025-01-13 DIAGNOSIS — F90.2 ATTENTION DEFICIT HYPERACTIVITY DISORDER (ADHD), COMBINED TYPE: ICD-10-CM

## 2025-01-13 DIAGNOSIS — F41.9 ANXIETY: ICD-10-CM

## 2025-01-13 DIAGNOSIS — R60.0 LOCALIZED EDEMA: Primary | ICD-10-CM

## 2025-01-13 PROCEDURE — 99214 OFFICE O/P EST MOD 30 MIN: CPT | Performed by: FAMILY MEDICINE

## 2025-01-13 RX ORDER — AZITHROMYCIN 250 MG/1
TABLET, FILM COATED ORAL
Qty: 6 TABLET | Refills: 0 | Status: SHIPPED | OUTPATIENT
Start: 2025-01-13 | End: 2025-01-23

## 2025-01-13 RX ORDER — DEXTROAMPHETAMINE SACCHARATE, AMPHETAMINE ASPARTATE, DEXTROAMPHETAMINE SULFATE AND AMPHETAMINE SULFATE 5; 5; 5; 5 MG/1; MG/1; MG/1; MG/1
20 TABLET ORAL 2 TIMES DAILY
Qty: 60 TABLET | Refills: 0 | Status: SHIPPED | OUTPATIENT
Start: 2025-01-13 | End: 2025-02-12

## 2025-01-13 RX ORDER — FUROSEMIDE 40 MG/1
40 TABLET ORAL DAILY
Qty: 30 TABLET | Refills: 5 | Status: SHIPPED | OUTPATIENT
Start: 2025-01-13

## 2025-01-13 RX ORDER — ALPRAZOLAM 1 MG/1
1 TABLET ORAL NIGHTLY PRN
Qty: 90 TABLET | Refills: 1 | Status: SHIPPED | OUTPATIENT
Start: 2025-01-13 | End: 2025-02-12

## 2025-01-13 SDOH — ECONOMIC STABILITY: FOOD INSECURITY: WITHIN THE PAST 12 MONTHS, THE FOOD YOU BOUGHT JUST DIDN'T LAST AND YOU DIDN'T HAVE MONEY TO GET MORE.: NEVER TRUE

## 2025-01-13 SDOH — ECONOMIC STABILITY: FOOD INSECURITY: WITHIN THE PAST 12 MONTHS, YOU WORRIED THAT YOUR FOOD WOULD RUN OUT BEFORE YOU GOT MONEY TO BUY MORE.: NEVER TRUE

## 2025-01-13 NOTE — PROGRESS NOTES
Chief Complaint   Patient presents with    Medication Refill       \"Have you been to the ER, urgent care clinic since your last visit?  Hospitalized since your last visit?\"    NO    “Have you seen or consulted any other health care providers outside of [facility] since your last visit?”    NO            Click Here for Release of Records Request       Vitals:    25 1139   BP: (!) 141/96   Pulse:    Resp:    SpO2:       Health Maintenance Due   Topic Date Due    Pneumococcal 0-64 years Vaccine (1 of 2 - PCV) Never done    Hepatitis A vaccine (1 of 2 - Risk 2-dose series) Never done    Hepatitis B vaccine (1 of 3 - 19+ 3-dose series) Never done        The patient, Hao Villarreal III, identity was verified by name and .
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
## 2025-01-14 LAB
ALBUMIN SERPL-MCNC: 3.7 G/DL (ref 3.5–5)
ALBUMIN/GLOB SERPL: 0.9 (ref 1.1–2.2)
ALP SERPL-CCNC: 104 U/L (ref 45–117)
ALT SERPL-CCNC: 61 U/L (ref 12–78)
ANION GAP SERPL CALC-SCNC: 5 MMOL/L (ref 2–12)
APPEARANCE UR: CLEAR
AST SERPL-CCNC: 52 U/L (ref 15–37)
BACTERIA URNS QL MICRO: NEGATIVE /HPF
BASOPHILS # BLD: 0.05 K/UL (ref 0–0.1)
BASOPHILS NFR BLD: 0.7 % (ref 0–1)
BILIRUB SERPL-MCNC: 0.3 MG/DL (ref 0.2–1)
BILIRUB UR QL: NEGATIVE
BUN SERPL-MCNC: 13 MG/DL (ref 6–20)
BUN/CREAT SERPL: 17 (ref 12–20)
CALCIUM SERPL-MCNC: 9.4 MG/DL (ref 8.5–10.1)
CHLORIDE SERPL-SCNC: 103 MMOL/L (ref 97–108)
CO2 SERPL-SCNC: 29 MMOL/L (ref 21–32)
COLOR UR: NORMAL
CREAT SERPL-MCNC: 0.76 MG/DL (ref 0.7–1.3)
DIFFERENTIAL METHOD BLD: NORMAL
EOSINOPHIL # BLD: 0.15 K/UL (ref 0–0.4)
EOSINOPHIL NFR BLD: 2.2 % (ref 0–7)
EPITH CASTS URNS QL MICRO: NORMAL /LPF
ERYTHROCYTE [DISTWIDTH] IN BLOOD BY AUTOMATED COUNT: 13.8 % (ref 11.5–14.5)
GLOBULIN SER CALC-MCNC: 3.9 G/DL (ref 2–4)
GLUCOSE SERPL-MCNC: 111 MG/DL (ref 65–100)
GLUCOSE UR STRIP.AUTO-MCNC: NEGATIVE MG/DL
HCT VFR BLD AUTO: 45.7 % (ref 36.6–50.3)
HGB BLD-MCNC: 14.5 G/DL (ref 12.1–17)
HGB UR QL STRIP: NEGATIVE
HYALINE CASTS URNS QL MICRO: NORMAL /LPF (ref 0–5)
IMM GRANULOCYTES # BLD AUTO: 0.02 K/UL (ref 0–0.04)
IMM GRANULOCYTES NFR BLD AUTO: 0.3 % (ref 0–0.5)
KETONES UR QL STRIP.AUTO: NEGATIVE MG/DL
LEUKOCYTE ESTERASE UR QL STRIP.AUTO: NEGATIVE
LYMPHOCYTES # BLD: 1.54 K/UL (ref 0.8–3.5)
LYMPHOCYTES NFR BLD: 22.6 % (ref 12–49)
MCH RBC QN AUTO: 26.5 PG (ref 26–34)
MCHC RBC AUTO-ENTMCNC: 31.7 G/DL (ref 30–36.5)
MCV RBC AUTO: 83.5 FL (ref 80–99)
MONOCYTES # BLD: 0.48 K/UL (ref 0–1)
MONOCYTES NFR BLD: 7.1 % (ref 5–13)
NEUTS SEG # BLD: 4.56 K/UL (ref 1.8–8)
NEUTS SEG NFR BLD: 67.1 % (ref 32–75)
NITRITE UR QL STRIP.AUTO: NEGATIVE
NRBC # BLD: 0 K/UL (ref 0–0.01)
NRBC BLD-RTO: 0 PER 100 WBC
PH UR STRIP: 8 (ref 5–8)
PLATELET # BLD AUTO: 285 K/UL (ref 150–400)
PMV BLD AUTO: 10.6 FL (ref 8.9–12.9)
POTASSIUM SERPL-SCNC: 4.4 MMOL/L (ref 3.5–5.1)
PROT SERPL-MCNC: 7.6 G/DL (ref 6.4–8.2)
PROT UR STRIP-MCNC: NEGATIVE MG/DL
RBC # BLD AUTO: 5.47 M/UL (ref 4.1–5.7)
RBC #/AREA URNS HPF: NORMAL /HPF (ref 0–5)
SODIUM SERPL-SCNC: 137 MMOL/L (ref 136–145)
SP GR UR REFRACTOMETRY: 1.02 (ref 1–1.03)
SPECIMEN HOLD: NORMAL
T4 FREE SERPL-MCNC: 1.4 NG/DL (ref 0.8–1.5)
TSH SERPL DL<=0.05 MIU/L-ACNC: 0.98 UIU/ML (ref 0.36–3.74)
UROBILINOGEN UR QL STRIP.AUTO: 0.2 EU/DL (ref 0.2–1)
WBC # BLD AUTO: 6.8 K/UL (ref 4.1–11.1)
WBC URNS QL MICRO: NORMAL /HPF (ref 0–4)

## 2025-01-15 LAB
BARBITURATES UR QL SCN: NEGATIVE NG/ML
BZE UR QL SCN: NEGATIVE NG/ML
CREAT UR-MCNC: 71.9 MG/DL (ref 20–300)
LABORATORY COMMENT REPORT: NORMAL
METHADONE UR QL SCN: NEGATIVE NG/ML
OPIATES UR QL SCN: NEGATIVE NG/ML
OXYCODONE+OXYMORPHONE UR QL SCN: NEGATIVE NG/ML
PCP UR QL: NEGATIVE NG/ML
PH UR: 8 (ref 4.5–8.9)
PROPOXYPH UR QL SCN: NEGATIVE NG/ML

## 2025-01-24 LAB
ALPRAZ UR CFM-MCNC: 216 NG/ML
ALPRAZ UR QL: POSITIVE
AMPHETAMINE URINE CONFIRM: 559 NG/ML
AMPHETAMINE, URINE: ABNORMAL
AMPHETAMINES UR QL SCN: NORMAL NG/ML
BARBITURATES UR QL SCN: NEGATIVE NG/ML
BENZODIAZ UR QL SCN: NORMAL NG/ML
BENZODIAZ UR QL: POSITIVE NG/ML
BZE UR QL SCN: NEGATIVE NG/ML
CANNABINOIDS UR QL CFM: 980 NG/ML
CANNABINOIDS UR QL SCN: NORMAL NG/ML
CANNABINOIDS, URINE: POSITIVE
CLONAZEPAM UR QL: NEGATIVE
CREAT UR-MCNC: 71.9 MG/DL (ref 20–300)
FLURAZEPAM UR QL: NEGATIVE
LABORATORY COMMENT REPORT: NORMAL
LORAZEPAM UR QL: NEGATIVE
Lab: >3000 NG/ML
METHADONE UR QL SCN: NEGATIVE NG/ML
METHAMPHETAMINE, URINE: POSITIVE
MIDAZOLAM UR QL CFM: NEGATIVE
NORDIAZEPAM UR QL: NEGATIVE
OPIATES UR QL SCN: NEGATIVE NG/ML
OXAZEPAM UR QL: NEGATIVE
OXYCODONE+OXYMORPHONE UR QL SCN: NEGATIVE NG/ML
PCP UR QL: NEGATIVE NG/ML
PH UR: 8 (ref 4.5–8.9)
PROPOXYPH UR QL SCN: NEGATIVE NG/ML
TEMAZEPAM UR QL CFM: NEGATIVE
TRIAZOLAM UR QL: NEGATIVE

## 2025-02-05 DIAGNOSIS — F41.9 ANXIETY: ICD-10-CM

## 2025-02-05 DIAGNOSIS — F90.2 ATTENTION DEFICIT HYPERACTIVITY DISORDER (ADHD), COMBINED TYPE: ICD-10-CM

## 2025-02-05 RX ORDER — ALPRAZOLAM 1 MG/1
1 TABLET ORAL NIGHTLY PRN
Qty: 90 TABLET | Refills: 1 | OUTPATIENT
Start: 2025-02-05 | End: 2025-03-07

## 2025-02-05 RX ORDER — DEXTROAMPHETAMINE SACCHARATE, AMPHETAMINE ASPARTATE, DEXTROAMPHETAMINE SULFATE AND AMPHETAMINE SULFATE 5; 5; 5; 5 MG/1; MG/1; MG/1; MG/1
20 TABLET ORAL 2 TIMES DAILY
Qty: 60 TABLET | Refills: 0 | OUTPATIENT
Start: 2025-02-05 | End: 2025-03-07

## 2025-02-05 NOTE — TELEPHONE ENCOUNTER
Pt is needing a refill on his   ALPRAZolam (XANAX) 1 MG tablet and amphetamine-dextroamphetamine (ADDERALL, 20MG,) 20 MG tablet sent to   [facility]/pharmacy #[id number] - [place] - [address] [phone] - F [phone]   He can be reached at [phone] if needed.

## 2025-02-17 DIAGNOSIS — F90.2 ATTENTION DEFICIT HYPERACTIVITY DISORDER (ADHD), COMBINED TYPE: ICD-10-CM

## 2025-02-17 RX ORDER — DEXTROAMPHETAMINE SACCHARATE, AMPHETAMINE ASPARTATE, DEXTROAMPHETAMINE SULFATE AND AMPHETAMINE SULFATE 5; 5; 5; 5 MG/1; MG/1; MG/1; MG/1
20 TABLET ORAL 2 TIMES DAILY
Qty: 60 TABLET | Refills: 0 | Status: SHIPPED | OUTPATIENT
Start: 2025-02-17 | End: 2025-03-19

## 2025-02-17 NOTE — TELEPHONE ENCOUNTER
Patient mother Dayami is requesting a RX refill  amphetamine-dextroamphetamine (ADDERALL, 20MG,) 20 MG tablet  she can be reached @ [phone]

## 2025-02-17 NOTE — TELEPHONE ENCOUNTER
Last office visit with pcp 1/13/25.  Next office visit  with pcp none scheduled . Due back around 7/13/25

## 2025-03-13 ENCOUNTER — TELEPHONE (OUTPATIENT)
Age: 45
End: 2025-03-13

## 2025-03-13 NOTE — TELEPHONE ENCOUNTER
Pharmacy contacted patient still has refills left on prescription. Patient contacted  and unable to reach. Phone not in service.

## 2025-03-13 NOTE — TELEPHONE ENCOUNTER
Pt is needing a refill on his ALPRAZolam (XANAX) 1 MG tablet sent to   [facility]/pharmacy #[id number] - [place], [place] - [address] - [place] [phone] - F [phone]   He can be reached at [phone] if needed.

## 2025-03-13 NOTE — TELEPHONE ENCOUNTER
Patient's MOM called Dayami Burton [phone]. Patient  was informed per note from Nurse Jean that there a refills on Xanax . No other questions or concerns..

## 2025-03-18 DIAGNOSIS — F90.2 ATTENTION DEFICIT HYPERACTIVITY DISORDER (ADHD), COMBINED TYPE: ICD-10-CM

## 2025-03-18 RX ORDER — DEXTROAMPHETAMINE SACCHARATE, AMPHETAMINE ASPARTATE, DEXTROAMPHETAMINE SULFATE AND AMPHETAMINE SULFATE 5; 5; 5; 5 MG/1; MG/1; MG/1; MG/1
20 TABLET ORAL 2 TIMES DAILY
Qty: 60 TABLET | Refills: 0 | Status: SHIPPED | OUTPATIENT
Start: 2025-03-18 | End: 2025-04-17

## 2025-03-18 NOTE — TELEPHONE ENCOUNTER
Pt is needing a refill on his   amphetamine-dextroamphetamine (ADDERALL, 20MG,) 20 MG tablet sent to   [facility]/pharmacy #[id number] - [place], [place] - [address] [phone] - f [phone]   Pt can be reached at [phone] if needed.

## 2025-03-20 ENCOUNTER — CLINICAL DOCUMENTATION (OUTPATIENT)
Age: 45
End: 2025-03-20

## 2025-04-10 DIAGNOSIS — F51.01 PRIMARY INSOMNIA: Primary | ICD-10-CM

## 2025-04-10 RX ORDER — ALPRAZOLAM 1 MG/1
1 TABLET ORAL NIGHTLY PRN
Qty: 30 TABLET | Refills: 0 | Status: SHIPPED | OUTPATIENT
Start: 2025-04-10 | End: 2025-05-10

## 2025-04-10 NOTE — TELEPHONE ENCOUNTER
Pt is needing a refill on his   ALPRAZolam (XANAX) 1 MG tablet sent in to   [facility]/pharmacy #[id number] - [place], [place] - [address] - [place] [phone] - F [phone]   And can be reached at [phone] if needed.

## 2025-04-28 DIAGNOSIS — F90.2 ATTENTION DEFICIT HYPERACTIVITY DISORDER (ADHD), COMBINED TYPE: ICD-10-CM

## 2025-04-28 RX ORDER — DEXTROAMPHETAMINE SACCHARATE, AMPHETAMINE ASPARTATE, DEXTROAMPHETAMINE SULFATE AND AMPHETAMINE SULFATE 5; 5; 5; 5 MG/1; MG/1; MG/1; MG/1
20 TABLET ORAL 2 TIMES DAILY
Qty: 60 TABLET | Refills: 0 | Status: SHIPPED | OUTPATIENT
Start: 2025-04-28 | End: 2025-05-28

## 2025-04-28 NOTE — TELEPHONE ENCOUNTER
Pt is needing a refill on his   amphetamine-dextroamphetamine (ADDERALL, 20MG,) 20 MG tablet  sent in to   [facility]/pharmacy #[id number] - [place], [place] - [address] [phone] - F [phone]   He can be reached at [phone]. Pt is also wanting to know if his xanax could be upped to two a day again.

## 2025-05-13 DIAGNOSIS — F51.01 PRIMARY INSOMNIA: ICD-10-CM

## 2025-05-13 RX ORDER — ALPRAZOLAM 1 MG/1
1 TABLET ORAL NIGHTLY PRN
Qty: 30 TABLET | Refills: 1 | Status: SHIPPED | OUTPATIENT
Start: 2025-05-13 | End: 2025-06-09 | Stop reason: SDUPTHER

## 2025-05-13 NOTE — TELEPHONE ENCOUNTER
Patient mother Dayami states that her son need a RX refill  ALPRAZolam (XANAX) 1 MG tablet she can be reached @ [phone]

## 2025-05-27 DIAGNOSIS — F90.2 ATTENTION DEFICIT HYPERACTIVITY DISORDER (ADHD), COMBINED TYPE: ICD-10-CM

## 2025-05-27 RX ORDER — DEXTROAMPHETAMINE SACCHARATE, AMPHETAMINE ASPARTATE, DEXTROAMPHETAMINE SULFATE AND AMPHETAMINE SULFATE 5; 5; 5; 5 MG/1; MG/1; MG/1; MG/1
20 TABLET ORAL 2 TIMES DAILY
Qty: 60 TABLET | Refills: 0 | Status: SHIPPED | OUTPATIENT
Start: 2025-05-27 | End: 2025-06-26

## 2025-05-27 NOTE — TELEPHONE ENCOUNTER
Pt is wanting a refill on his   amphetamine-dextroamphetamine (ADDERALL, 20MG,) 20 MG tablet  sent in to   [facility]/pharmacy #[id number] - [place], [place] - [address] [phone] - F [phone]   He can be reached at [phone] if needed.

## 2025-06-09 ENCOUNTER — OFFICE VISIT (OUTPATIENT)
Age: 45
End: 2025-06-09
Payer: MEDICAID

## 2025-06-09 VITALS
DIASTOLIC BLOOD PRESSURE: 62 MMHG | HEART RATE: 72 BPM | BODY MASS INDEX: 21.76 KG/M2 | HEIGHT: 70 IN | SYSTOLIC BLOOD PRESSURE: 95 MMHG | RESPIRATION RATE: 16 BRPM | OXYGEN SATURATION: 97 % | TEMPERATURE: 97.8 F | WEIGHT: 152 LBS

## 2025-06-09 DIAGNOSIS — F51.01 PRIMARY INSOMNIA: ICD-10-CM

## 2025-06-09 DIAGNOSIS — F43.9 STRESS: Primary | ICD-10-CM

## 2025-06-09 PROCEDURE — 99213 OFFICE O/P EST LOW 20 MIN: CPT | Performed by: FAMILY MEDICINE

## 2025-06-09 RX ORDER — ALPRAZOLAM 1 MG/1
1 TABLET ORAL NIGHTLY PRN
Qty: 45 TABLET | Refills: 2 | Status: SHIPPED | OUTPATIENT
Start: 2025-06-09 | End: 2025-09-07

## 2025-06-09 NOTE — PROGRESS NOTES
Chief Complaint   Patient presents with    Annual Exam    Abdominal Pain     Not digesting food completely.        \"Have you been to the ER, urgent care clinic since your last visit?  Hospitalized since your last visit?\"    NO    “Have you seen or consulted any other health care providers outside of [facility] since your last visit?”    NO            Click Here for Release of Records Request       Vitals:    25 1536   BP: 95/62   Pulse: 72   Resp: 16   Temp: 97.8 °F (36.6 °C)   SpO2: 97%      Health Maintenance Due   Topic Date Due    Hepatitis A vaccine (1 of 2 - Risk 2-dose series) Never done    Hepatitis B vaccine (1 of 3 - 19+ 3-dose series) Never done    Pneumococcal 0-49 years Vaccine (1 of 2 - PCV) Never done        The patient, Hao Villarreal III, identity was verified by name and .
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
## 2025-06-10 ENCOUNTER — TELEPHONE (OUTPATIENT)
Age: 45
End: 2025-06-10

## 2025-06-10 DIAGNOSIS — F51.01 PRIMARY INSOMNIA: ICD-10-CM

## 2025-06-10 RX ORDER — ALPRAZOLAM 1 MG/1
TABLET ORAL
Qty: 45 TABLET | Refills: 2 | OUTPATIENT
Start: 2025-06-10 | End: 2025-09-08

## 2025-06-10 NOTE — TELEPHONE ENCOUNTER
This was sent with 2 sets of directions. Please clarify.        For Pharmacy Admin Tracking Only    Program: Medication Refill  Intervention Detail: New Rx: 1, reason: Needs Additional Therapy  Time Spent (min): 5

## 2025-06-16 DIAGNOSIS — F51.01 PRIMARY INSOMNIA: ICD-10-CM

## 2025-06-16 RX ORDER — ALPRAZOLAM 1 MG/1
TABLET ORAL
Qty: 45 TABLET | Refills: 2 | Status: SHIPPED | OUTPATIENT
Start: 2025-06-16 | End: 2025-09-16

## 2025-06-16 NOTE — TELEPHONE ENCOUNTER
Dr. Shaffer gave clarification order for Alprazolam 1/2 tab po in the am and 1 tab po qhs. VORB. Pharmacy notified.

## 2025-06-16 NOTE — TELEPHONE ENCOUNTER
[facility] is still waiting for clarification on his   ALPRAZolam (XANAX) 1 MG tablet and pt is all out of this medication.

## 2025-06-24 DIAGNOSIS — F90.2 ATTENTION DEFICIT HYPERACTIVITY DISORDER (ADHD), COMBINED TYPE: ICD-10-CM

## 2025-06-24 RX ORDER — DEXTROAMPHETAMINE SACCHARATE, AMPHETAMINE ASPARTATE, DEXTROAMPHETAMINE SULFATE AND AMPHETAMINE SULFATE 5; 5; 5; 5 MG/1; MG/1; MG/1; MG/1
20 TABLET ORAL 2 TIMES DAILY
Qty: 60 TABLET | Refills: 0 | Status: SHIPPED | OUTPATIENT
Start: 2025-06-24 | End: 2025-07-24

## 2025-06-24 NOTE — TELEPHONE ENCOUNTER
Pt is needing a refill on his amphetamine-dextroamphetamine (ADDERALL, 20MG,) 20 MG tablet sent in to   [facility]/pharmacy #[id number] - [place], [place] - [address] [phone] - f [phone]   Pt can be reached at [phone] if needed.

## 2025-08-25 ENCOUNTER — TELEPHONE (OUTPATIENT)
Age: 45
End: 2025-08-25

## 2025-08-25 DIAGNOSIS — F90.2 ATTENTION DEFICIT HYPERACTIVITY DISORDER (ADHD), COMBINED TYPE: ICD-10-CM

## 2025-08-26 RX ORDER — DEXTROAMPHETAMINE SACCHARATE, AMPHETAMINE ASPARTATE, DEXTROAMPHETAMINE SULFATE AND AMPHETAMINE SULFATE 5; 5; 5; 5 MG/1; MG/1; MG/1; MG/1
20 TABLET ORAL 2 TIMES DAILY
Qty: 60 TABLET | Refills: 0 | Status: SHIPPED | OUTPATIENT
Start: 2025-08-26 | End: 2025-09-25

## (undated) DEVICE — ELECTRODE,RADIOTRANSLUCENT,FOAM,5PK: Brand: MEDLINE

## (undated) DEVICE — Device

## (undated) DEVICE — NEEDLE HYPO 18GA L1.5IN PNK S STL HUB POLYPR SHLD REG BVL

## (undated) DEVICE — 1200 GUARD II KIT W/5MM TUBE W/O VAC TUBE: Brand: GUARDIAN

## (undated) DEVICE — TOWEL 4 PLY TISS 19X30 SUE WHT

## (undated) DEVICE — SYR 3ML LL TIP 1/10ML GRAD --

## (undated) DEVICE — Z DISCONTINUED PER MEDLINE LINE GAS SAMPLING O2/CO2 LNG AD 13 FT NSL W/ TBNG FILTERLINE

## (undated) DEVICE — SET ADMIN 16ML TBNG L100IN 2 Y INJ SITE IV PIGGY BK DISP

## (undated) DEVICE — NEONATAL-ADULT SPO2 SENSOR: Brand: NELLCOR

## (undated) DEVICE — SYR 10ML LUER LOK 1/5ML GRAD --

## (undated) DEVICE — CATH IV AUTOGRD BC PNK 20GA 25 -- INSYTE

## (undated) DEVICE — BASIN EMSIS 16OZ GRAPHITE PLAS KID SHP MOLD GRAD FOR ORAL

## (undated) DEVICE — SOLIDIFIER FLD 2OZ 1500CC N DISINF IN BTL DISP SAFESORB